# Patient Record
Sex: FEMALE | Race: WHITE | NOT HISPANIC OR LATINO | Employment: PART TIME | URBAN - METROPOLITAN AREA
[De-identification: names, ages, dates, MRNs, and addresses within clinical notes are randomized per-mention and may not be internally consistent; named-entity substitution may affect disease eponyms.]

---

## 2017-01-20 ENCOUNTER — OFFICE VISIT (OUTPATIENT)
Dept: FAMILY MEDICINE CLINIC | Facility: CLINIC | Age: 22
End: 2017-01-20

## 2017-01-20 VITALS
TEMPERATURE: 98 F | OXYGEN SATURATION: 98 % | SYSTOLIC BLOOD PRESSURE: 124 MMHG | HEIGHT: 65 IN | WEIGHT: 241 LBS | DIASTOLIC BLOOD PRESSURE: 78 MMHG | HEART RATE: 94 BPM | BODY MASS INDEX: 40.15 KG/M2

## 2017-01-20 DIAGNOSIS — N75.0 BARTHOLIN CYST: Primary | ICD-10-CM

## 2017-01-20 PROCEDURE — 99213 OFFICE O/P EST LOW 20 MIN: CPT | Performed by: NURSE PRACTITIONER

## 2017-01-20 RX ORDER — MINOCYCLINE HYDROCHLORIDE 100 MG/1
100 CAPSULE ORAL 2 TIMES DAILY
Qty: 20 CAPSULE | Refills: 0 | Status: SHIPPED | OUTPATIENT
Start: 2017-01-20 | End: 2017-02-10 | Stop reason: HOSPADM

## 2017-01-20 NOTE — MR AVS SNAPSHOT
Carina Bravojoce   1/20/2017 4:00 PM   Office Visit    Provider:  ZACH Bernstein   Department:  [facility]   Dept Phone:  [phone]                Your Full Care Plan              Today's Medication Changes          These changes are accurate as of: 1/20/17  4:23 PM.  If you have any questions, ask your nurse or doctor.               New Medication(s)Ordered:     minocycline 100 MG capsule   Commonly known as:  MINOCIN   Take 1 capsule by mouth 2 (Two) Times a Day.   Started by:  ZACH Bernstein         Stop taking medication(s)listed here:     MethylPREDNISolone 4 MG tablet   Commonly known as:  MEDROL (FELIX)   Stopped by:  ZACH Bernstein                Where to Get Your Medications      These medications were sent to [facility] [address] [facility] [address] [facility] AT [facility] - [phone]  - [phone]   [address] [facility] [address]    Hours:  24-hours Phone:  [phone]     minocycline 100 MG capsule                  Your Updated Medication List          This list is accurate as of: 1/20/17  4:23 PM.  Always use your most recent med list.                albuterol 108 (90 BASE) MCG/ACT inhaler   Commonly known as:  PROVENTIL HFA;VENTOLIN HFA       buPROPion 100 MG tablet   Commonly known as:  WELLBUTRIN       citalopram 20 MG tablet   Commonly known as:  CeleXA       EPIPEN 2-FELIX 0.3 MG/0.3ML solution auto-injector injection   Generic drug:  EPINEPHrine       hydrOXYzine 25 MG tablet   Commonly known as:  ATARAX   Take 1 tablet by mouth Every 8 (Eight) Hours As Needed for itching.       minocycline 100 MG capsule   Commonly known as:  MINOCIN   Take 1 capsule by mouth 2 (Two) Times a Day.       ORTHO TRI-CYCLEN LO 0.18/0.215/0.25 MG-25 MCG per tablet   Generic drug:  norgestimate-ethinyl estradiol               You Were Diagnosed With        Codes Ana Luisa Prasad  "cyst    -  Primary ICD-10-CM: N75.0  ICD-9-CM: 616.2       Instructions     None    Patient Instructions History      MyChart Signup     [facility] Online [employer] Platform allows you to send messages to your doctor, view your test results, renew your prescriptions, schedule appointments, and more. To sign up, go to [employer] and click on the Sign Up Now link in the New User? box. Enter your BEET[employer] Activation Code exactly as it appears below along with the last four digits of your Social Security Number and your Date of Birth () to complete the sign-up process. If you do not sign up before the expiration date, you must request a new code.    BEETmobile Activation Code: [email or web address]  Expires: 2/3/2017  4:23 PM    If you have questions, you can email [email or web address] or call [phone] to talk to our BEET[employer] staff. Remember, BEETmobile is NOT to be used for urgent needs. For medical emergencies, dial 911.               Other Info from Your Visit           Your Appointments     2017 10:45 AM EDT   Follow Up with Jozef Ivey DO   [facility] (--)    [address] [place] [id number] [phone]           Arrive 15 minutes prior to appointment.              Allergies     Eggs Or Egg-derived Products  Swelling    Eyes swell shut, has some eggs cooked in food.    Nitrofurantoin      Unsure    Other      FOOD ALLERGIES: see list : peanuts and ranch dressing    Amoxicillin  Rash    Erythromycin  Rash    Ibuprofen  Rash    Sulfa Antibiotics  Rash    Triamcinolone  Rash      Reason for Visit     Cyst located on her vagina x 3 days       Vital Signs     Blood Pressure Pulse Temperature Height Weight Oxygen Saturation    124/78 94 98 °F (36.7 °C) (Oral) 65\" (165.1 cm) 241 lb (109 kg) 98%    Body Mass Index Smoking Status                40.1 kg/m2 Never Smoker          Problems and Diagnoses Noted     Bartholin cyst    -  Primary      "

## 2017-01-20 NOTE — PROGRESS NOTES
Subjective   Carina Galindo is a 21 y.o. female.     Vaginal Pain   Primary symptoms comment: sore on the labia. This is a new problem. The current episode started in the past 7 days. The problem occurs constantly. The problem has been gradually worsening. The pain is moderate. The problem affects the right side. She is not pregnant. Pertinent negatives include no chills or fever. Exacerbated by: pressure. She has tried warm baths for the symptoms. The treatment provided no relief. She is sexually active. No, her partner does not have an STD. (Barthelin cysts)        The following portions of the patient's history were reviewed and updated as appropriate: allergies, current medications, past medical history, past social history and problem list.    Review of Systems   Constitutional: Negative.  Negative for chills and fever.   Respiratory: Negative.    Cardiovascular: Negative.    Genitourinary: Positive for genital sores (right labia) and vaginal pain.       Objective   Physical Exam   Constitutional: She appears well-developed and well-nourished.   Cardiovascular: Normal rate, regular rhythm and normal heart sounds.    Pulmonary/Chest: Effort normal and breath sounds normal.   Genitourinary: Pelvic exam was performed with patient supine.   Genitourinary Comments: Swelling, erythema to the right labia.     Nursing note and vitals reviewed.    Vitals:    01/20/17 1549   BP: 124/78   Pulse: 94   Temp: 98 °F (36.7 °C)   SpO2: 98%       Assessment/Plan   Carina was seen today for cyst.    Diagnoses and all orders for this visit:    Bartholin cyst  -     minocycline (MINOCIN) 100 MG capsule; Take 1 capsule by mouth 2 (Two) Times a Day.    Warm soaks  Tylenol as needed for pain  RTC if not improved

## 2017-02-10 ENCOUNTER — OFFICE VISIT (OUTPATIENT)
Dept: FAMILY MEDICINE CLINIC | Facility: CLINIC | Age: 22
End: 2017-02-10

## 2017-02-10 VITALS
SYSTOLIC BLOOD PRESSURE: 96 MMHG | DIASTOLIC BLOOD PRESSURE: 76 MMHG | WEIGHT: 243 LBS | HEIGHT: 65 IN | TEMPERATURE: 98.5 F | HEART RATE: 91 BPM | BODY MASS INDEX: 40.48 KG/M2 | OXYGEN SATURATION: 98 %

## 2017-02-10 DIAGNOSIS — J45.20 MILD INTERMITTENT ASTHMA WITHOUT COMPLICATION: Primary | ICD-10-CM

## 2017-02-10 DIAGNOSIS — T78.40XD ALLERGIC REACTION, SUBSEQUENT ENCOUNTER: ICD-10-CM

## 2017-02-10 DIAGNOSIS — R63.5 WEIGHT GAIN, ABNORMAL: ICD-10-CM

## 2017-02-10 PROCEDURE — 99213 OFFICE O/P EST LOW 20 MIN: CPT | Performed by: NURSE PRACTITIONER

## 2017-02-10 RX ORDER — EPINEPHRINE 0.3 MG/.3ML
0.3 INJECTION SUBCUTANEOUS ONCE
Qty: 2 EACH | Refills: 1 | Status: SHIPPED | OUTPATIENT
Start: 2017-02-10 | End: 2017-02-10

## 2017-02-10 RX ORDER — ALBUTEROL SULFATE 90 UG/1
2 AEROSOL, METERED RESPIRATORY (INHALATION) AS NEEDED
Qty: 3.7 G | Refills: 3 | Status: SHIPPED | OUTPATIENT
Start: 2017-02-10 | End: 2018-03-07

## 2017-02-10 NOTE — PROGRESS NOTES
"Socorro Galindo is a 21 y.o. female who is here from an allergic reaction she had on 17. Patient said she took benadryl since her epi pen had . Patient also would like to get a refill on her albuterol inhaler.     History of Present Illness   Allergic reaction involved eating cookies from [employer], involved swelling of lip and tongue. Can swallow, but does get associated itching. Does see Dr. Ferrer, told unsure if reactions would get better or worse with time. Has been taking claritin every day in the morning.     In last week increased inhaler use, 2 x week. Less is typical.     Wants thyroid checked, aunts with thyroid issues (mom's side), trying to lose weight. By going to gym, diet change.  The following portions of the patient's history were reviewed and updated as appropriate: allergies, current medications, past family history, past medical history, past social history, past surgical history and problem list.    Review of Systems   Constitutional: Negative.    HENT: Positive for congestion. Negative for nosebleeds, postnasal drip and sore throat.         Lip and part of tongue will swell   Eyes: Negative.    Respiratory: Positive for wheezing.    Cardiovascular: Negative.    Musculoskeletal: Negative.    Allergic/Immunologic: Positive for environmental allergies and food allergies (mild peanut allergy).   Neurological: Negative.    Hematological: Negative.    Psychiatric/Behavioral: Negative.      Visit Vitals   • BP 96/76 (BP Location: Right arm, Patient Position: Sitting, Cuff Size: Large Adult)   • Pulse 91   • Temp 98.5 °F (36.9 °C) (Oral)   • Ht 65\" (165.1 cm)   • Wt 243 lb (110 kg)   • LMP 02/10/2017   • SpO2 98%   • BMI 40.44 kg/m2     Objective   Physical Exam   Constitutional: She appears well-developed and well-nourished.   HENT:   Head: Normocephalic and atraumatic.   Right Ear: A middle ear effusion is present.   Left Ear: Tympanic membrane normal.   Nose: Nose normal. "   Mouth/Throat: Uvula is midline, oropharynx is clear and moist and mucous membranes are normal.   Neck: Normal range of motion. Neck supple. No thyromegaly present.   Cardiovascular: Normal rate, regular rhythm and normal heart sounds.    Pulses:       Carotid pulses are 2+ on the right side, and 2+ on the left side.  Pulmonary/Chest: Effort normal and breath sounds normal.   Lymphadenopathy:     She has no cervical adenopathy.   Skin: She is not diaphoretic.   Psychiatric: She has a normal mood and affect. Her behavior is normal. Judgment and thought content normal.   Nursing note and vitals reviewed.    Assessment/Plan   Problems Addressed this Visit        Respiratory    Asthma - Primary    Relevant Medications    albuterol (PROVENTIL HFA;VENTOLIN HFA) 108 (90 BASE) MCG/ACT inhaler       Other    Weight gain, abnormal    Relevant Orders    TSH      Other Visit Diagnoses     Allergic reaction, subsequent encounter        Relevant Medications    EPINEPHrine (EPIPEN 2-FELIX) 0.3 MG/0.3ML solution auto-injector injection        Can add pepcid for residual sensation, without swelling. If uses epi pen, to ER, Agrees. Consider plant based diet. Suspect thyroid will be normal, sequelae of prolonged illness 1 yr ago is wieght gain. FU annually and prn

## 2017-02-11 LAB — TSH SERPL DL<=0.005 MIU/L-ACNC: 1.93 UIU/ML (ref 0.45–4.5)

## 2017-02-13 ENCOUNTER — TELEPHONE (OUTPATIENT)
Dept: FAMILY MEDICINE CLINIC | Facility: CLINIC | Age: 22
End: 2017-02-13

## 2017-04-27 ENCOUNTER — OFFICE VISIT (OUTPATIENT)
Dept: FAMILY MEDICINE CLINIC | Facility: CLINIC | Age: 22
End: 2017-04-27

## 2017-04-27 VITALS
SYSTOLIC BLOOD PRESSURE: 108 MMHG | HEIGHT: 65 IN | BODY MASS INDEX: 40.48 KG/M2 | DIASTOLIC BLOOD PRESSURE: 68 MMHG | OXYGEN SATURATION: 98 % | HEART RATE: 98 BPM | WEIGHT: 243 LBS | TEMPERATURE: 98.4 F

## 2017-04-27 DIAGNOSIS — L50.9 URTICARIA: ICD-10-CM

## 2017-04-27 DIAGNOSIS — M25.571 ACUTE RIGHT ANKLE PAIN: Primary | ICD-10-CM

## 2017-04-27 PROCEDURE — 99213 OFFICE O/P EST LOW 20 MIN: CPT | Performed by: FAMILY MEDICINE

## 2017-04-27 PROCEDURE — 73610 X-RAY EXAM OF ANKLE: CPT | Performed by: FAMILY MEDICINE

## 2017-04-27 NOTE — PROGRESS NOTES
Subjective   Carina Galindo is a 21 y.o. female. Presents today for   Chief Complaint   Patient presents with   • Rash   • Ankle Pain     right       Ankle Injury    Incident onset: 2 weeks ago. The incident occurred in the yard. The injury mechanism was a twisting injury (stepped in hole). The pain is present in the right ankle. The quality of the pain is described as aching. The pain is moderate. The pain has been intermittent since onset. Pertinent negatives include no numbness or tingling. Associated symptoms comments: +swelling. The symptoms are aggravated by weight bearing and movement. She has tried ice, elevation and NSAIDs for the symptoms. The treatment provided mild relief.   Rash   This is a recurrent problem. The current episode started 1 to 4 weeks ago. The problem has been waxing and waning since onset. Location: arms/legs;  Variable;  Notes after eats certain things, has had extensive allergy testing in past not really elucidating inciting cause. The rash is characterized by redness and itchiness. It is unknown if there was an exposure to a precipitant. Pertinent negatives include no congestion, cough, facial edema, fever, shortness of breath, sore throat or vomiting. Treatments tried: claritine. The treatment provided mild relief. Her past medical history is significant for allergies.       Review of Systems   Constitutional: Negative for fever.   HENT: Negative for congestion and sore throat.    Respiratory: Negative for cough and shortness of breath.    Gastrointestinal: Negative for vomiting.   Skin: Positive for rash.   Neurological: Negative for tingling and numbness.       The following portions of the patient's history were reviewed and updated as appropriate: allergies, current medications, past medical history and problem list.    Patient Active Problem List   Diagnosis   • Palpitations   • Swelling   • Anxiety   • Asthma   • Weight gain, abnormal       Allergies   Allergen Reactions   •  "Eggs Or Egg-Derived Products Swelling     Eyes swell shut, has some eggs cooked in food.   • Nitrofurantoin      Unsure     • Other      FOOD ALLERGIES: see list : peanuts and ranch dressing   • Amoxicillin Rash   • Erythromycin Rash   • Ibuprofen Rash   • Sulfa Antibiotics Rash   • Triamcinolone Rash       Current Outpatient Prescriptions on File Prior to Visit   Medication Sig Dispense Refill   • buPROPion (WELLBUTRIN) 100 MG tablet Take 100 mg by mouth 2 (Two) Times a Day.     • citalopram (CeleXA) 20 MG tablet Take 20 mg by mouth daily.     • norgestimate-ethinyl estradiol (ORTHO TRI-CYCLEN LO) 0.18/0.215/0.25 MG-25 MCG per tablet Take 1 tablet by mouth daily.     • albuterol (PROVENTIL HFA;VENTOLIN HFA) 108 (90 BASE) MCG/ACT inhaler Inhale 2 puffs As Needed for wheezing. 3.7 g 3   • hydrOXYzine (ATARAX) 25 MG tablet Take 1 tablet by mouth Every 8 (Eight) Hours As Needed for itching. 30 tablet 0     No current facility-administered medications on file prior to visit.        Objective   Vitals:    04/27/17 1108   BP: 108/68   BP Location: Right arm   Patient Position: Sitting   Cuff Size: Large Adult   Pulse: 98   Temp: 98.4 °F (36.9 °C)   TempSrc: Oral   SpO2: 98%   Weight: 243 lb (110 kg)   Height: 65\" (165.1 cm)       Physical Exam   Constitutional: She is oriented to person, place, and time. She appears well-developed and well-nourished.   Musculoskeletal:        Right ankle: She exhibits swelling. She exhibits normal range of motion, no ecchymosis, no deformity and normal pulse. Tenderness. Lateral malleolus, AITFL, CF ligament and posterior TFL tenderness found. No medial malleolus, no head of 5th metatarsal and no proximal fibula tenderness found. Achilles tendon normal. Achilles tendon exhibits no pain, no defect and normal Gallagher's test results.   Neurological: She is alert and oriented to person, place, and time.   Skin: Skin is warm and dry. Rash noted. Rash is macular.   Psychiatric: She has a " normal mood and affect. Her behavior is normal.   Nursing note and vitals reviewed.  XRAY: right ankle  INDICATION:  Right ankle pain  Wet read:  Negative acute  no Comparative data  Imagine independently viewed by myself  Radiology reading pending.    Assessment/Plan   Carina was seen today for rash and ankle pain.    Diagnoses and all orders for this visit:    Acute right ankle pain  -     XR Ankle 3+ View Right  -     MethylPREDNISolone (MEDROL, FELIX,) 4 MG tablet; Take as directed on package instructions.    Urticaria  -     fexofenadine (ALLEGRA) 180 MG tablet; Take 1 tablet by mouth Daily.  -     MethylPREDNISolone (MEDROL, FELIX,) 4 MG tablet; Take as directed on package instructions.    -ice, elevation, compression and rest;  RTC if no improvement  -urticaria - consider another allergist referral, start H1 blocker and will give medrol felix         -Follow up: Prn - RTC if worse or no improvement.          Current Outpatient Prescriptions:   •  buPROPion (WELLBUTRIN) 100 MG tablet, Take 100 mg by mouth 2 (Two) Times a Day., Disp: , Rfl:   •  citalopram (CeleXA) 20 MG tablet, Take 20 mg by mouth daily., Disp: , Rfl:   •  norgestimate-ethinyl estradiol (ORTHO TRI-CYCLEN LO) 0.18/0.215/0.25 MG-25 MCG per tablet, Take 1 tablet by mouth daily., Disp: , Rfl:   •  albuterol (PROVENTIL HFA;VENTOLIN HFA) 108 (90 BASE) MCG/ACT inhaler, Inhale 2 puffs As Needed for wheezing., Disp: 3.7 g, Rfl: 3  •  hydrOXYzine (ATARAX) 25 MG tablet, Take 1 tablet by mouth Every 8 (Eight) Hours As Needed for itching., Disp: 30 tablet, Rfl: 0

## 2017-04-28 RX ORDER — METHYLPREDNISOLONE 4 MG/1
TABLET ORAL
Qty: 21 TABLET | Refills: 0 | Status: SHIPPED | OUTPATIENT
Start: 2017-04-28 | End: 2017-06-20

## 2017-04-28 RX ORDER — FEXOFENADINE HCL 180 MG/1
180 TABLET ORAL DAILY
Qty: 30 TABLET | Refills: 12 | Status: SHIPPED | OUTPATIENT
Start: 2017-04-28 | End: 2018-10-01

## 2017-06-20 ENCOUNTER — OFFICE VISIT (OUTPATIENT)
Dept: FAMILY MEDICINE CLINIC | Facility: CLINIC | Age: 22
End: 2017-06-20

## 2017-06-20 VITALS
TEMPERATURE: 98.3 F | DIASTOLIC BLOOD PRESSURE: 78 MMHG | HEART RATE: 100 BPM | OXYGEN SATURATION: 99 % | SYSTOLIC BLOOD PRESSURE: 112 MMHG | HEIGHT: 65 IN | BODY MASS INDEX: 39.82 KG/M2 | WEIGHT: 239 LBS

## 2017-06-20 DIAGNOSIS — J01.10 ACUTE NON-RECURRENT FRONTAL SINUSITIS: ICD-10-CM

## 2017-06-20 DIAGNOSIS — M65.9 TENOSYNOVITIS OF HAND: ICD-10-CM

## 2017-06-20 DIAGNOSIS — J45.20 MILD INTERMITTENT ASTHMA WITHOUT COMPLICATION: Primary | ICD-10-CM

## 2017-06-20 PROCEDURE — 99213 OFFICE O/P EST LOW 20 MIN: CPT | Performed by: NURSE PRACTITIONER

## 2017-06-20 RX ORDER — METHYLPREDNISOLONE 4 MG/1
TABLET ORAL
Qty: 21 TABLET | Refills: 0 | Status: SHIPPED | OUTPATIENT
Start: 2017-06-20 | End: 2017-07-31

## 2017-06-20 RX ORDER — DOXYCYCLINE HYCLATE 100 MG/1
100 CAPSULE ORAL 2 TIMES DAILY
Qty: 14 CAPSULE | Refills: 0 | Status: SHIPPED | OUTPATIENT
Start: 2017-06-20 | End: 2017-07-31

## 2017-06-20 RX ORDER — TRAZODONE HYDROCHLORIDE 50 MG/1
TABLET ORAL
Refills: 2 | COMMUNITY
Start: 2017-06-12 | End: 2018-10-01

## 2017-06-20 RX ORDER — KETOTIFEN FUMARATE 0.35 MG/ML
SOLUTION/ DROPS OPHTHALMIC
Refills: 11 | COMMUNITY
Start: 2017-05-19 | End: 2017-07-31

## 2017-06-20 RX ORDER — LEVOCETIRIZINE DIHYDROCHLORIDE 5 MG/1
TABLET, FILM COATED ORAL
Refills: 11 | COMMUNITY
Start: 2017-05-23 | End: 2017-09-18

## 2017-06-20 RX ORDER — BUPROPION HYDROCHLORIDE 300 MG/1
TABLET ORAL
Refills: 2 | COMMUNITY
Start: 2017-06-12 | End: 2018-03-07

## 2017-06-20 NOTE — PROGRESS NOTES
"Socorro Galindo is a 22 y.o. female who presents c/o nasal congestion, headache x 3 days. Also with knot on right palm x 1 week.     History of Present Illness   Has similar symptoms as dad, has taken allegra, flonase, and tylenol for the headaches.   The following portions of the patient's history were reviewed and updated as appropriate: allergies, current medications, past family history, past medical history, past social history, past surgical history and problem list.    Review of Systems   Constitutional: Negative for chills and fever.   HENT: Positive for congestion, ear pain, postnasal drip, rhinorrhea, sinus pressure and sore throat. Negative for ear discharge, facial swelling, hearing loss, nosebleeds, sneezing and trouble swallowing.    Respiratory: Negative for cough, shortness of breath and wheezing.    Cardiovascular: Negative.  Negative for chest pain.   Gastrointestinal: Negative for abdominal pain.   Endocrine: Negative.    Musculoskeletal: Positive for arthralgias (knot on R finger, sore x 3 weeks, R handed).   Allergic/Immunologic: Negative for environmental allergies.   Neurological: Positive for headaches.   Psychiatric/Behavioral: Negative.      /78  Pulse 100  Temp 98.3 °F (36.8 °C) (Oral)   Ht 65\" (165.1 cm)  Wt 239 lb (108 kg)  LMP 05/29/2017 (Approximate)  SpO2 99%  BMI 39.77 kg/m2    Objective   Physical Exam   Constitutional: She appears well-developed and well-nourished. No distress.   HENT:   Head: Normocephalic.   Right Ear: Tympanic membrane is retracted. No middle ear effusion.   Left Ear: Tympanic membrane is retracted. A middle ear effusion is present.   Nose: Mucosal edema and rhinorrhea present. Right sinus exhibits maxillary sinus tenderness and frontal sinus tenderness. Left sinus exhibits maxillary sinus tenderness and frontal sinus tenderness.   Mouth/Throat: Oropharyngeal exudate and posterior oropharyngeal erythema present.   Cardiovascular: Normal " rate, regular rhythm, normal heart sounds and intact distal pulses.    Pulmonary/Chest: Effort normal and breath sounds normal. No respiratory distress. She has no wheezes. She has no rales.   Musculoskeletal:        Right hand: She exhibits tenderness. She exhibits normal range of motion, no bony tenderness, normal two-point discrimination, normal capillary refill, no deformity, no laceration and no swelling. Normal sensation noted. Normal strength noted.        Hands:  Tendon sheath irritation   Lymphadenopathy:     She has cervical adenopathy.   Skin: She is not diaphoretic.   Psychiatric: She has a normal mood and affect. Judgment and thought content normal.   Nursing note and vitals reviewed.    Assessment/Plan   Problems Addressed this Visit        Respiratory    Asthma - Primary      Other Visit Diagnoses     Acute non-recurrent frontal sinusitis        Tenosynovitis of hand            Continue allegra and flonase, cover for sinusitis, will treat tenosynovitis with medrol dose pack. FU if not settling.

## 2017-07-31 ENCOUNTER — OFFICE VISIT (OUTPATIENT)
Dept: FAMILY MEDICINE CLINIC | Facility: CLINIC | Age: 22
End: 2017-07-31

## 2017-07-31 VITALS
DIASTOLIC BLOOD PRESSURE: 74 MMHG | BODY MASS INDEX: 39.99 KG/M2 | SYSTOLIC BLOOD PRESSURE: 116 MMHG | HEART RATE: 97 BPM | TEMPERATURE: 97.9 F | WEIGHT: 240 LBS | OXYGEN SATURATION: 98 % | HEIGHT: 65 IN

## 2017-07-31 DIAGNOSIS — T63.441A BEE STING REACTION, ACCIDENTAL OR UNINTENTIONAL, INITIAL ENCOUNTER: Primary | ICD-10-CM

## 2017-07-31 PROCEDURE — 99213 OFFICE O/P EST LOW 20 MIN: CPT | Performed by: NURSE PRACTITIONER

## 2017-07-31 RX ORDER — HYDROXYZINE HYDROCHLORIDE 25 MG/1
25 TABLET, FILM COATED ORAL 3 TIMES DAILY PRN
Qty: 15 TABLET | Refills: 0 | Status: SHIPPED | OUTPATIENT
Start: 2017-07-31 | End: 2018-03-07

## 2017-07-31 NOTE — PROGRESS NOTES
"Socorro Galindo is a 22 y.o. female. This past weekend she was stung in her L leg by bees and has pain and edema. She was stung on Sat. Yesterday the leg was really swollen and red. She took some Benadryl and used cold wash cloth on it. This seemed to help. She denies any shortness of breath or wheezing.       Insect Bite   This is a new problem. The current episode started in the past 7 days. The problem has been gradually improving. Associated symptoms comments: Redness, swelling of the left upper thigh  . Treatments tried: cold cloth and benadryl.        The following portions of the patient's history were reviewed and updated as appropriate: allergies, current medications, past medical history, past social history and problem list.    Review of Systems   Constitutional: Negative.    Respiratory: Negative.    Cardiovascular: Negative.    Skin:        Bee stings on the left upper thigh         Objective   Physical Exam   Constitutional: She appears well-developed and well-nourished.   Cardiovascular: Normal rate, regular rhythm and normal heart sounds.    Pulmonary/Chest: Effort normal and breath sounds normal.   Skin: Skin is warm and dry.   Small red whelps on the upper left thigh, slight erythema     Nursing note and vitals reviewed.    Vitals:    07/31/17 1002   BP: 116/74   Pulse: 97   Temp: 97.9 °F (36.6 °C)   TempSrc: Oral   SpO2: 98%   Weight: 240 lb (109 kg)   Height: 65\" (165.1 cm)       Assessment/Plan   Diagnoses and all orders for this visit:    Bee sting reaction, accidental or unintentional, initial encounter  -     hydrOXYzine (ATARAX) 25 MG tablet; Take 1 tablet by mouth 3 (Three) Times a Day As Needed for Itching.    Continue cool cloths  RTC if not improved.           "

## 2017-09-05 ENCOUNTER — OFFICE VISIT (OUTPATIENT)
Dept: FAMILY MEDICINE CLINIC | Facility: CLINIC | Age: 22
End: 2017-09-05

## 2017-09-05 VITALS
HEIGHT: 65 IN | HEART RATE: 94 BPM | TEMPERATURE: 98.1 F | SYSTOLIC BLOOD PRESSURE: 122 MMHG | OXYGEN SATURATION: 96 % | DIASTOLIC BLOOD PRESSURE: 76 MMHG | BODY MASS INDEX: 40.32 KG/M2 | WEIGHT: 242 LBS

## 2017-09-05 DIAGNOSIS — J02.9 SORE THROAT: ICD-10-CM

## 2017-09-05 DIAGNOSIS — J02.9 PHARYNGITIS, UNSPECIFIED ETIOLOGY: Primary | ICD-10-CM

## 2017-09-05 LAB
EXPIRATION DATE: NORMAL
INTERNAL CONTROL: NORMAL
Lab: NORMAL
S PYO AG THROAT QL: NEGATIVE

## 2017-09-05 PROCEDURE — 99213 OFFICE O/P EST LOW 20 MIN: CPT | Performed by: NURSE PRACTITIONER

## 2017-09-05 PROCEDURE — 87880 STREP A ASSAY W/OPTIC: CPT | Performed by: NURSE PRACTITIONER

## 2017-09-05 RX ORDER — AZITHROMYCIN 250 MG/1
TABLET, FILM COATED ORAL
Qty: 6 TABLET | Refills: 0 | Status: SHIPPED | OUTPATIENT
Start: 2017-09-05 | End: 2018-02-14 | Stop reason: SDUPTHER

## 2017-09-05 NOTE — PROGRESS NOTES
"Socorro Galindo is a 22 y.o. female. Sore throat that started yesterday. Difficulty swallowing.  She has no known contact who were ill. She is not sure if has been running a fever because she has not checked it but has been having chills. She does note she has swollen gland especially on the left.    Sore Throat    This is a new problem. The current episode started yesterday. The problem has been unchanged. There has been no fever. Associated symptoms include a hoarse voice, swollen glands and trouble swallowing. She has had no exposure to strep or mono. She has tried cool liquids for the symptoms. The treatment provided no relief.        The following portions of the patient's history were reviewed and updated as appropriate: allergies, current medications, past medical history, past social history, past surgical history and problem list.    Review of Systems   Constitutional: Negative.    HENT: Positive for hoarse voice, sore throat, trouble swallowing and voice change.    Respiratory: Negative.    Cardiovascular: Negative.        Objective   Physical Exam   Constitutional: Vital signs are normal. She appears well-developed and well-nourished. She is cooperative.   HENT:   Right Ear: Hearing and tympanic membrane normal.   Left Ear: Hearing and tympanic membrane normal.   Nose: Nose normal.   Mouth/Throat: Mucous membranes are normal. Posterior oropharyngeal edema and posterior oropharyngeal erythema present.   Cardiovascular: Normal rate, regular rhythm and normal heart sounds.    Pulmonary/Chest: Effort normal and breath sounds normal.   Neurological: She is alert.   Nursing note and vitals reviewed.    Vitals:    09/05/17 1103   BP: 122/76   Pulse: 94   Temp: 98.1 °F (36.7 °C)   TempSrc: Oral   SpO2: 96%   Weight: 242 lb (110 kg)   Height: 65\" (165.1 cm)     Results for orders placed or performed in visit on 09/05/17   POC Rapid Strep A   Result Value Ref Range    Rapid Strep A Screen Negative " Negative, VALID, INVALID, Not Performed    Internal Control Passed Passed    Lot Number [id number]     Expiration Date 2018/10/31        Assessment/Plan   Diagnoses and all orders for this visit:    Pharyngitis, unspecified etiology  -     azithromycin (ZITHROMAX) 250 MG tablet; Take 2 tablets the first day, then 1 tablet daily for 4 days.    Sore throat  -     POC Rapid Strep A    She has taken Zithromax many times without problems.   Lots of fluids  Ok to return to school tomorrow if afebrile.  Tylenol or advil as needed for fever/pain.  RTC if not improved.

## 2017-09-18 ENCOUNTER — OFFICE VISIT (OUTPATIENT)
Dept: FAMILY MEDICINE CLINIC | Facility: CLINIC | Age: 22
End: 2017-09-18

## 2017-09-18 VITALS
WEIGHT: 240 LBS | BODY MASS INDEX: 39.99 KG/M2 | HEART RATE: 98 BPM | DIASTOLIC BLOOD PRESSURE: 76 MMHG | TEMPERATURE: 98.3 F | OXYGEN SATURATION: 96 % | SYSTOLIC BLOOD PRESSURE: 118 MMHG | HEIGHT: 65 IN

## 2017-09-18 DIAGNOSIS — J00 ACUTE NASOPHARYNGITIS: Primary | ICD-10-CM

## 2017-09-18 PROCEDURE — 99213 OFFICE O/P EST LOW 20 MIN: CPT | Performed by: NURSE PRACTITIONER

## 2017-09-18 RX ORDER — BENZONATATE 100 MG/1
100 CAPSULE ORAL 3 TIMES DAILY PRN
Qty: 30 CAPSULE | Refills: 0 | Status: SHIPPED | OUTPATIENT
Start: 2017-09-18 | End: 2018-03-07

## 2017-09-18 NOTE — PROGRESS NOTES
Subjective   Carina Galindo is a 22 y.o. female. Nasal congestion that started on Friday, and also diarrhea. Feels short of breath. Has gotten progressively worse since then. She is using her inhaler and the allegra but still feels like she's a little short of breath especially with walking. She has cough but not productive. + sinus pain and headache.    Started with the diarrhea last week. Was on antibiotics the week before. She states it's improving now. No nausea or vomiting.     URI    This is a new problem. The current episode started in the past 7 days. The problem has been gradually worsening. There has been no fever. Associated symptoms include congestion, coughing, diarrhea, headaches and sinus pain.        The following portions of the patient's history were reviewed and updated as appropriate: allergies, current medications, past medical history, past social history, past surgical history and problem list.    Review of Systems   HENT: Positive for congestion.    Respiratory: Positive for cough.    Gastrointestinal: Positive for diarrhea.   Neurological: Positive for headaches.       Objective   Physical Exam   Constitutional: Vital signs are normal. She appears well-developed and well-nourished. She is cooperative.   HENT:   Right Ear: Hearing normal. A middle ear effusion is present.   Left Ear: Hearing normal. A middle ear effusion is present.   Mouth/Throat: Uvula is midline and mucous membranes are normal. Posterior oropharyngeal erythema present.   Cardiovascular: Normal rate, regular rhythm and normal heart sounds.    Pulmonary/Chest: Effort normal and breath sounds normal.   Neurological: She is alert.   Nursing note and vitals reviewed.    Vitals:    09/18/17 0944   BP: 118/76   Pulse: 98   Temp: 98.3 °F (36.8 °C)   SpO2: 96%       Assessment/Plan   Diagnoses and all orders for this visit:    Acute nasopharyngitis  -     Chlorcyclizine-Pseudoephed 25-60 MG tablet; Take 1 tablet by mouth 2 (Two)  Times a Day.  -     benzonatate (TESSALON PERLES) 100 MG capsule; Take 1 capsule by mouth 3 (Three) Times a Day As Needed for Cough.    Lots of fluids.   Hold the Allegra while taking the Stahist.   If not improving or increased colored d/c or productive cough, RTC.

## 2018-02-14 ENCOUNTER — OFFICE VISIT (OUTPATIENT)
Dept: FAMILY MEDICINE CLINIC | Facility: CLINIC | Age: 23
End: 2018-02-14

## 2018-02-14 VITALS
WEIGHT: 250 LBS | BODY MASS INDEX: 41.65 KG/M2 | HEIGHT: 65 IN | SYSTOLIC BLOOD PRESSURE: 122 MMHG | OXYGEN SATURATION: 97 % | TEMPERATURE: 98.2 F | HEART RATE: 114 BPM | DIASTOLIC BLOOD PRESSURE: 78 MMHG

## 2018-02-14 DIAGNOSIS — J02.9 PHARYNGITIS, UNSPECIFIED ETIOLOGY: ICD-10-CM

## 2018-02-14 DIAGNOSIS — J02.9 SORE THROAT: Primary | ICD-10-CM

## 2018-02-14 DIAGNOSIS — J32.9 SINUSITIS, UNSPECIFIED CHRONICITY, UNSPECIFIED LOCATION: ICD-10-CM

## 2018-02-14 LAB
EXPIRATION DATE: NORMAL
INTERNAL CONTROL: NORMAL
Lab: NORMAL
S PYO AG THROAT QL: NEGATIVE

## 2018-02-14 PROCEDURE — 87880 STREP A ASSAY W/OPTIC: CPT | Performed by: NURSE PRACTITIONER

## 2018-02-14 PROCEDURE — 99213 OFFICE O/P EST LOW 20 MIN: CPT | Performed by: NURSE PRACTITIONER

## 2018-02-14 RX ORDER — AZITHROMYCIN 250 MG/1
TABLET, FILM COATED ORAL
Qty: 6 TABLET | Refills: 0 | Status: SHIPPED | OUTPATIENT
Start: 2018-02-14 | End: 2018-03-07

## 2018-02-14 RX ORDER — MONTELUKAST SODIUM 10 MG/1
10 TABLET ORAL DAILY
Refills: 0 | COMMUNITY
Start: 2018-01-15 | End: 2023-03-14

## 2018-02-14 NOTE — PROGRESS NOTES
Subjective   Carina Galindo is a 22 y.o. female. Sore throat, drainage. Started getting sick 2 days ago. No fever, + body aches, no chills. Thick yellow/green sinus drainage. Productive cough of yellow/green sputum. She has sinus surgery a few weeks back and then again last week had some more polyps removed. She is worried about infection. She took her normal allergy medications and used a saline nasal rinse but has not improved.     Cough   This is a new problem. The current episode started in the past 7 days. Associated symptoms include headaches, postnasal drip, rhinorrhea, a sore throat and wheezing. Pertinent negatives include no chills, ear pain, fever or shortness of breath. The symptoms are aggravated by lying down. Treatments tried: allergy meds. Her past medical history is significant for asthma and environmental allergies.   Sinusitis   This is a new problem. The current episode started in the past 7 days. The problem has been gradually worsening since onset. There has been no fever. Associated symptoms include congestion, coughing, headaches, a hoarse voice, sinus pressure and a sore throat. Pertinent negatives include no chills, ear pain or shortness of breath. Past treatments include nothing (routine allergy meds).        The following portions of the patient's history were reviewed and updated as appropriate: allergies, current medications, past medical history, past social history, past surgical history and problem list.    Review of Systems   Constitutional: Negative.  Negative for chills and fever.   HENT: Positive for congestion, hoarse voice, postnasal drip, rhinorrhea, sinus pressure and sore throat. Negative for ear pain.    Respiratory: Positive for cough and wheezing. Negative for chest tightness and shortness of breath.    Cardiovascular: Negative.    Allergic/Immunologic: Positive for environmental allergies.   Neurological: Positive for headaches.       Objective   Physical Exam  "  Constitutional: Vital signs are normal. She appears well-developed and well-nourished. She is cooperative.   HENT:   Right Ear: Hearing normal. A middle ear effusion is present.   Left Ear: Hearing normal. A middle ear effusion is present.   Nose: Mucosal edema and rhinorrhea present. Right sinus exhibits frontal sinus tenderness. Left sinus exhibits frontal sinus tenderness.   Mouth/Throat: Uvula is midline and mucous membranes are normal. Posterior oropharyngeal edema and posterior oropharyngeal erythema present. No tonsillar exudate.   Cardiovascular: Normal rate, regular rhythm and normal heart sounds.    Pulmonary/Chest: Effort normal and breath sounds normal.   Neurological: She is alert.   Nursing note and vitals reviewed.    Vitals:    02/14/18 0933   BP: 122/78   Pulse: 114   Temp: 98.2 °F (36.8 °C)   TempSrc: Oral   SpO2: 97%   Weight: 113 kg (250 lb)   Height: 165.1 cm (65\")     Results for orders placed or performed in visit on 02/14/18   POC Rapid Strep A   Result Value Ref Range    Rapid Strep A Screen Negative Negative, VALID, INVALID, Not Performed    Internal Control Passed Passed    Lot Number [id number]     Expiration Date 2019/05/31        Assessment/Plan   Diagnoses and all orders for this visit:    Sore throat  -     POC Rapid Strep A    Pharyngitis, unspecified etiology  -     azithromycin (ZITHROMAX) 250 MG tablet; Take 2 tablets the first day, then 1 tablet daily for 4 days.    Sinusitis, unspecified chronicity, unspecified location  -     azithromycin (ZITHROMAX) 250 MG tablet; Take 2 tablets the first day, then 1 tablet daily for 4 days.    Will cover with antibiotic in view of recent surgery.   Continue allergy medications  Has used Z pack before without problems.   RTC if not improved.           "

## 2018-03-07 ENCOUNTER — OFFICE VISIT (OUTPATIENT)
Dept: FAMILY MEDICINE CLINIC | Facility: CLINIC | Age: 23
End: 2018-03-07

## 2018-03-07 VITALS
WEIGHT: 240 LBS | OXYGEN SATURATION: 98 % | TEMPERATURE: 99.7 F | DIASTOLIC BLOOD PRESSURE: 74 MMHG | SYSTOLIC BLOOD PRESSURE: 118 MMHG | HEIGHT: 65 IN | HEART RATE: 102 BPM | BODY MASS INDEX: 39.99 KG/M2

## 2018-03-07 DIAGNOSIS — L02.214 CUTANEOUS ABSCESS OF GROIN: Primary | ICD-10-CM

## 2018-03-07 PROCEDURE — 99213 OFFICE O/P EST LOW 20 MIN: CPT | Performed by: NURSE PRACTITIONER

## 2018-03-07 RX ORDER — DOXYCYCLINE HYCLATE 100 MG/1
100 CAPSULE ORAL 2 TIMES DAILY
Qty: 14 CAPSULE | Refills: 0 | Status: SHIPPED | OUTPATIENT
Start: 2018-03-07 | End: 2018-07-30

## 2018-07-30 ENCOUNTER — OFFICE VISIT (OUTPATIENT)
Dept: FAMILY MEDICINE CLINIC | Facility: CLINIC | Age: 23
End: 2018-07-30

## 2018-07-30 VITALS
TEMPERATURE: 98.6 F | WEIGHT: 250 LBS | SYSTOLIC BLOOD PRESSURE: 98 MMHG | BODY MASS INDEX: 41.65 KG/M2 | HEART RATE: 94 BPM | DIASTOLIC BLOOD PRESSURE: 68 MMHG | OXYGEN SATURATION: 98 % | HEIGHT: 65 IN

## 2018-07-30 DIAGNOSIS — J45.20 MILD INTERMITTENT ASTHMA WITHOUT COMPLICATION: ICD-10-CM
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
## 2018-07-30 PROCEDURE — 99213 OFFICE O/P EST LOW 20 MIN: CPT | Performed by: NURSE PRACTITIONER

## 2018-07-30 RX ORDER — ALBUTEROL SULFATE 90 UG/1
2 AEROSOL, METERED RESPIRATORY (INHALATION) EVERY 4 HOURS PRN
Qty: 18 G | Refills: 6 | Status: SHIPPED | OUTPATIENT
Start: 2018-07-30 | End: 2018-08-03 | Stop reason: CLARIF

## 2018-07-30 RX ORDER — DIAPER,BRIEF,INFANT-TODD,DISP
EACH MISCELLANEOUS 3 TIMES DAILY
Qty: 42 G | Refills: 0 | Status: SHIPPED | OUTPATIENT
Start: 2018-07-30 | End: 2018-10-25

## 2018-07-30 NOTE — PROGRESS NOTES
Subjective   Carina Galindo is a 23 y.o. female here today with possible allergic reaction. Recently moved to [facility] and thinks maybe there are new pollens or trees out there she is reacting to. She also has atopic dermatitis which she occasionally has breakouts from. This started a couple of days ago with itching and spots. She started taking benadryl and that has helped. There was some pustules in the ones on her chest. Itchy and red. They are starting to clear now. She also needs a refill on her Albuterol inhaler because the other one has .       Rash   This is a new problem. The current episode started in the past 7 days. The problem has been waxing and waning since onset. The rash is diffuse. The rash is characterized by itchiness and redness. Associated with: recently moved to [facility].  Pertinent negatives include no fever. Past treatments include antihistamine. The treatment provided moderate relief.        The following portions of the patient's history were reviewed and updated as appropriate: allergies, current medications, past family history, past medical history, past social history, past surgical history and problem list.    Review of Systems   Constitutional: Negative.  Negative for fever.   Respiratory: Negative.    Cardiovascular: Negative.    Skin: Positive for rash.       Objective   Physical Exam   Constitutional: Vital signs are normal. She appears well-developed and well-nourished. She is cooperative.   Cardiovascular: Normal rate, regular rhythm and normal heart sounds.    Pulmonary/Chest: Effort normal and breath sounds normal.   Neurological: She is alert.   Skin: Skin is warm and dry. Rash noted. Rash is maculopapular.   Chest, back and the back of the left knee   Nursing note and vitals reviewed.    Vitals:    18 1400   BP: 98/68   BP Location: Right arm   Patient Position: Sitting   Cuff Size: Large Adult   Pulse: 94   Temp: 98.6 °F (37 °C)   TempSrc: Oral   SpO2: 98%  "  Weight: 113 kg (250 lb)   Height: 165.1 cm (65\")       Assessment/Plan   Carina was seen today for allergic reaction.    Diagnoses and all orders for this visit:    Insect bite, initial encounter  -     hydrocortisone 1 % cream; Apply  topically to the appropriate area as directed 3 (Three) Times a Day.    Mild intermittent asthma without complication    Other orders  -     albuterol (PROVENTIL HFA;VENTOLIN HFA) 108 (90 Base) MCG/ACT inhaler; Inhale 2 puffs Every 4 (Four) Hours As Needed for Wheezing or Shortness of Air.    Suspect the ones on the chest are bites. Treat with hydrocortisone cream.  Refill albuterol inhaler for exacerbation of asthma as needed.  Continue benadryl  Follow up if not improving.          "

## 2018-08-03 RX ORDER — ALBUTEROL SULFATE 90 UG/1
2 AEROSOL, METERED RESPIRATORY (INHALATION) EVERY 4 HOURS PRN
Qty: 18 G | Refills: 1 | Status: SHIPPED | OUTPATIENT
Start: 2018-08-03

## 2018-10-01 ENCOUNTER — OFFICE VISIT (OUTPATIENT)
Dept: OBSTETRICS AND GYNECOLOGY | Age: 23
End: 2018-10-01

## 2018-10-01 VITALS
WEIGHT: 243 LBS | HEIGHT: 65 IN | DIASTOLIC BLOOD PRESSURE: 78 MMHG | SYSTOLIC BLOOD PRESSURE: 120 MMHG | BODY MASS INDEX: 40.48 KG/M2

## 2018-10-01 DIAGNOSIS — Z30.41 ENCOUNTER FOR SURVEILLANCE OF CONTRACEPTIVE PILLS: ICD-10-CM

## 2018-10-01 DIAGNOSIS — E66.01 CLASS 3 SEVERE OBESITY DUE TO EXCESS CALORIES WITHOUT SERIOUS COMORBIDITY WITH BODY MASS INDEX (BMI) OF 40.0 TO 44.9 IN ADULT (HCC): ICD-10-CM

## 2018-10-01 DIAGNOSIS — R63.5 WEIGHT GAIN, ABNORMAL: ICD-10-CM

## 2018-10-01 DIAGNOSIS — Z12.4 SCREENING FOR CERVICAL CANCER: Primary | ICD-10-CM

## 2018-10-01 PROBLEM — E66.813 CLASS 3 SEVERE OBESITY DUE TO EXCESS CALORIES WITHOUT SERIOUS COMORBIDITY IN ADULT: Status: ACTIVE | Noted: 2018-10-01

## 2018-10-01 PROCEDURE — 99385 PREV VISIT NEW AGE 18-39: CPT | Performed by: OBSTETRICS & GYNECOLOGY

## 2018-10-01 RX ORDER — TRAZODONE HYDROCHLORIDE 50 MG/1
TABLET ORAL NIGHTLY PRN
COMMUNITY
Start: 2017-06-12

## 2018-10-01 RX ORDER — NORGESTIMATE AND ETHINYL ESTRADIOL 7DAYSX3 LO
1 KIT ORAL DAILY
Qty: 28 TABLET | Refills: 11 | Status: SHIPPED | OUTPATIENT
Start: 2018-10-01 | End: 2019-03-18 | Stop reason: SDUPTHER

## 2018-10-01 RX ORDER — CITALOPRAM 40 MG/1
60 TABLET ORAL DAILY
COMMUNITY
End: 2023-03-14

## 2018-10-01 RX ORDER — NORGESTIMATE AND ETHINYL ESTRADIOL 7DAYSX3 LO
1 KIT ORAL
COMMUNITY
Start: 2018-09-12 | End: 2018-10-01 | Stop reason: SDUPTHER

## 2018-10-01 NOTE — ASSESSMENT & PLAN NOTE
Refill  For OCPS today, discussed other options for birth control but not interested at this time.

## 2018-10-01 NOTE — ASSESSMENT & PLAN NOTE
Discussed healthy eating and exercise today. Witting information provided also. Discussed need to lose weight

## 2018-10-01 NOTE — PATIENT INSTRUCTIONS
Preventing Unhealthy Weight Gain, Adult  Staying at a healthy weight is important. When fat builds up in your body, you may become overweight or obese. These conditions put you at greater risk for developing certain health problems, such as heart disease, diabetes, sleeping problems, joint problems, and some cancers.  Unhealthy weight gain is often the result of making unhealthy choices in what you eat. It is also a result of not getting enough exercise. You can make changes to your lifestyle to prevent obesity and stay as healthy as possible.  What nutrition changes can be made?  To maintain a healthy weight and prevent obesity:  · Eat only as much as your body needs. To do this:  ? Pay attention to signs that you are hungry or full. Stop eating as soon as you feel full.  ? If you feel hungry, try drinking water first. Drink enough water so your urine is clear or pale yellow.  ? Eat smaller portions.  ? Look at serving sizes on food labels. Most foods contain more than one serving per container.  ? Eat the recommended amount of calories for your gender and activity level. While most active people should eat around 2,000 calories per day, if you are trying to lose weight or are not very active, you main need to eat less calories. Talk to your health care provider or dietitian about how many calories you should eat each day.  · Choose healthy foods, such as:  ? Fruits and vegetables. Try to fill at least half of your plate at each meal with fruits and vegetables.  ? Whole grains, such as whole wheat bread, brown rice, and quinoa.  ? Lean meats, such as chicken or fish.  ? Other healthy proteins, such as beans, eggs, or tofu.  ? Healthy fats, such as nuts, seeds, fatty fish, and olive oil.  ? Low-fat or fat-free dairy.  · Check food labels and avoid food and drinks that:  ? Are high in calories.  ? Have added sugar.  ? Are high in sodium.  ? Have saturated fats or trans fats.  · Limit how much you eat of the following  foods:  ? Prepackaged meals.  ? Fast food.  ? Fried foods.  ? Processed meat, such as miller, sausage, and deli meats.  ? Fatty cuts of red meat and poultry with skin.  · Cook foods in healthier ways, such as by baking, broiling, or grilling.  · When grocery shopping, try to shop around the outside of the store. This helps you buy mostly fresh foods and avoid canned and prepackaged foods.    What lifestyle changes can be made?  · Exercise at least 30 minutes 5 or more days each week. Exercising includes brisk walking, yard work, biking, running, swimming, and team sports like basketball and soccer. Ask your health care provider which exercises are safe for you.  · Do not use any products that contain nicotine or tobacco, such as cigarettes and e-cigarettes. If you need help quitting, ask your health care provider.  · Limit alcohol intake to no more than 1 drink a day for nonpregnant women and 2 drinks a day for men. One drink equals 12 oz of beer, 5 oz of wine, or 1½ oz of hard liquor.  · Try to get 7-9 hours of sleep each night.  What other changes can be made?  · Keep a food and activity journal to keep track of:  ? What you ate and how many calories you had. Remember to count sauces, dressings, and side dishes.  ? Whether you were active, and what exercises you did.  ? Your calorie, weight, and activity goals.  · Check your weight regularly. Track any changes. If you notice you have gained weight, make changes to your diet or activity routine.  · Avoid taking weight-loss medicines or supplements. Talk to your health care provider before starting any new medicine or supplement.  · Talk to your health care provider before trying any new diet or exercise plan.  Why are these changes important?  Eating healthy, staying active, and having healthy habits not only help prevent obesity, they also:  · Help you to manage stress and emotions.  · Help you to connect with friends and family.  · Improve your  self-esteem.  · Improve your sleep.  · Prevent long-term health problems.    What can happen if changes are not made?  Being obese or overweight can cause you to develop joint or bone problems, which can make it hard for you to stay active or do activities you enjoy. Being obese or overweight also puts stress on your heart and lungs and can lead to health problems like diabetes, heart disease, and some cancers.  Where to find more information:  Talk with your health care provider or a dietitian about healthy eating and healthy lifestyle choices. You may also find other information through these resources:  · U.[place] Department of Agriculture MyPlate: www.choosemyplate.gov  · American Heart Association: www.heart.org  · Centers for Disease Control and Prevention: www.cdc.gov    Summary  · Staying at a healthy weight is important. It helps prevent certain diseases and health problems, such as heart disease, diabetes, joint problems, sleep disorders, and some cancers.  · Being obese or overweight can cause you to develop joint or bone problems, which can make it hard for you to stay active or do activities you enjoy.  · You can prevent unhealthy weight gain by eating a healthy diet, exercising regularly, not smoking, limiting alcohol, and getting enough sleep.  · Talk with your health care provider or a dietitian for guidance about healthy eating and healthy lifestyle choices.  This information is not intended to replace advice given to you by your health care provider. Make sure you discuss any questions you have with your health care provider.  Document Released: 12/19/2017 Document Revised: 01/24/2018 Document Reviewed: 01/24/2018  [employer] Interactive Patient Education © 2018 [employer]
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
## 2018-10-02 LAB
CONV .: NORMAL
CYTOLOGIST CVX/VAG CYTO: NORMAL
CYTOLOGY CVX/VAG DOC THIN PREP: NORMAL
DX ICD CODE: NORMAL
HIV 1 & 2 AB SER-IMP: NORMAL
OTHER STN SPEC: NORMAL
PATH REPORT.FINAL DX SPEC: NORMAL
STAT OF ADQ CVX/VAG CYTO-IMP: NORMAL

## 2018-10-03 ENCOUNTER — TELEPHONE (OUTPATIENT)
Dept: OBSTETRICS AND GYNECOLOGY | Age: 23
End: 2018-10-03

## 2018-10-03 NOTE — TELEPHONE ENCOUNTER
Attempted to call patient and notify that her pap was normal however did not answer and her voicemail was full    Pauline Clements MD  10/3/2018  1:18 PM

## 2018-10-25 ENCOUNTER — OFFICE VISIT (OUTPATIENT)
Dept: FAMILY MEDICINE CLINIC | Facility: CLINIC | Age: 23
End: 2018-10-25

## 2018-10-25 VITALS
WEIGHT: 244 LBS | TEMPERATURE: 98.5 F | DIASTOLIC BLOOD PRESSURE: 72 MMHG | SYSTOLIC BLOOD PRESSURE: 118 MMHG | HEIGHT: 65 IN | HEART RATE: 81 BPM | BODY MASS INDEX: 40.65 KG/M2 | OXYGEN SATURATION: 99 %

## 2018-10-25 DIAGNOSIS — J02.9 PHARYNGITIS, UNSPECIFIED ETIOLOGY: Primary | ICD-10-CM

## 2018-10-25 DIAGNOSIS — M54.2 CERVICAL MUSCLE PAIN: ICD-10-CM

## 2018-10-25 LAB
EXPIRATION DATE: NORMAL
INTERNAL CONTROL: NORMAL
Lab: NORMAL
S PYO AG THROAT QL: NEGATIVE

## 2018-10-25 PROCEDURE — 99213 OFFICE O/P EST LOW 20 MIN: CPT | Performed by: NURSE PRACTITIONER

## 2018-10-25 PROCEDURE — 87880 STREP A ASSAY W/OPTIC: CPT | Performed by: NURSE PRACTITIONER

## 2018-10-25 RX ORDER — METHYLPREDNISOLONE 4 MG/1
TABLET ORAL
Qty: 21 TABLET | Refills: 0 | Status: SHIPPED | OUTPATIENT
Start: 2018-10-25 | End: 2019-04-17

## 2018-10-25 RX ORDER — MELOXICAM 15 MG/1
TABLET ORAL
COMMUNITY
Start: 2018-10-16 | End: 2019-05-23

## 2018-10-25 NOTE — PROGRESS NOTES
"Socorro Galindo is a 23 y.o. female who presents c/o sore throat and diarrhea x 1 day. Also with pain in right shoulder x 4 days.     History of Present Illness   1 day history of ST and diarrhea, associated no other symptoms, has had 3 diarrhea stools.   4 day history of R shoulder pain described as sharp affecting R arm and neck, can't turn head. Has had surgery on shoulder several years ago for bone spur, no recent injury. Woke up with pain, thought had slept wrong, but getting progressively worse.   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   The following portions of the patient's history were reviewed and updated as appropriate: allergies, current medications, past family history, past medical history, past social history, past surgical history and problem list.    Review of Systems   Constitutional: Positive for fever (low grade yesterday). Negative for chills.   HENT: Positive for sore throat. Negative for congestion and sinus pressure.    Respiratory: Negative for cough.    Gastrointestinal: Positive for diarrhea. Negative for abdominal pain, nausea and vomiting.   Musculoskeletal: Positive for arthralgias and neck pain.   Psychiatric/Behavioral: Negative.      /72   Pulse 81   Temp 98.5 °F (36.9 °C) (Oral)   Ht 165.1 cm (65\")   Wt 111 kg (244 lb)   SpO2 99%   BMI 40.60 kg/m²     Objective   Physical Exam   Constitutional: She appears well-developed and well-nourished. No distress.   HENT:   Right Ear: Tympanic membrane normal.   Left Ear: Tympanic membrane normal.   Nose: Nose normal. Right sinus exhibits no maxillary sinus tenderness and no frontal sinus tenderness. Left sinus exhibits no maxillary sinus tenderness and no frontal sinus tenderness.   Mouth/Throat: Uvula is midline and mucous membranes are normal. Posterior oropharyngeal erythema present.   Neck: Neck supple. No JVD present. No tracheal deviation present. No thyromegaly present. "   Cardiovascular: Normal rate, regular rhythm and normal heart sounds.    Pulmonary/Chest: Effort normal and breath sounds normal.   Abdominal: Soft. Bowel sounds are normal. She exhibits no distension. There is no tenderness.   Musculoskeletal:        Right shoulder: Normal. She exhibits no tenderness.        Cervical back: She exhibits decreased range of motion, tenderness and pain. She exhibits no bony tenderness and no spasm.   Lymphadenopathy:     She has no cervical adenopathy.   Neurological: She has normal strength. No sensory deficit.   Skin: Skin is warm and dry.   Nursing note and vitals reviewed.    Assessment/Plan   Problems Addressed this Visit     None      Visit Diagnoses     Pharyngitis, unspecified etiology    -  Primary    Relevant Orders    POC Rapid Strep A (Completed)    Cervical muscle pain        Relevant Medications    MethylPREDNISolone (MEDROL) 4 MG tablet        Rapid strep negative. Favor viral syndrome. otc symptom controllers as needed  Cervical muscle pain--HEP, medrol dose pack to address. Follow up if not settling 1 week. ASAP if any worsening.

## 2019-03-18 ENCOUNTER — TELEPHONE (OUTPATIENT)
Dept: OBSTETRICS AND GYNECOLOGY | Age: 24
End: 2019-03-18

## 2019-03-18 RX ORDER — NORGESTIMATE AND ETHINYL ESTRADIOL 7DAYSX3 LO
1 KIT ORAL DAILY
Qty: 28 TABLET | Refills: 7 | Status: SHIPPED | OUTPATIENT
Start: 2019-03-18 | End: 2019-11-01 | Stop reason: SDUPTHER

## 2019-03-18 NOTE — TELEPHONE ENCOUNTER
PT'S MOTHER CALLING, DAUGHTER NEEDS REFILL ON ORTHO TRY CYCLEN LO CALLED INTO [facility]. THANKS LAST AE WAS 10/2018.

## 2019-04-17 ENCOUNTER — OFFICE VISIT (OUTPATIENT)
Dept: FAMILY MEDICINE CLINIC | Facility: CLINIC | Age: 24
End: 2019-04-17

## 2019-04-17 VITALS
TEMPERATURE: 99.2 F | HEIGHT: 65 IN | WEIGHT: 232 LBS | HEART RATE: 87 BPM | OXYGEN SATURATION: 98 % | DIASTOLIC BLOOD PRESSURE: 80 MMHG | BODY MASS INDEX: 38.65 KG/M2 | SYSTOLIC BLOOD PRESSURE: 122 MMHG

## 2019-04-17 DIAGNOSIS — R39.9 UTI SYMPTOMS: Primary | ICD-10-CM

## 2019-04-17 DIAGNOSIS — R10.9 FLANK PAIN, ACUTE: ICD-10-CM

## 2019-04-17 DIAGNOSIS — R10.32 ABDOMINAL PAIN, LLQ (LEFT LOWER QUADRANT): ICD-10-CM

## 2019-04-17 PROBLEM — S93.401A SPRAIN AND STRAIN OF RIGHT ANKLE: Status: ACTIVE | Noted: 2019-02-11

## 2019-04-17 PROBLEM — S96.911A SPRAIN AND STRAIN OF RIGHT ANKLE: Status: ACTIVE | Noted: 2019-02-11

## 2019-04-17 LAB
BILIRUB BLD-MCNC: NEGATIVE MG/DL
CLARITY, POC: ABNORMAL
COLOR UR: ABNORMAL
GLUCOSE UR STRIP-MCNC: NEGATIVE MG/DL
KETONES UR QL: NEGATIVE
LEUKOCYTE EST, POC: ABNORMAL
NITRITE UR-MCNC: NEGATIVE MG/ML
PH UR: 6.5 [PH] (ref 5–8)
PROT UR STRIP-MCNC: ABNORMAL MG/DL
RBC # UR STRIP: ABNORMAL /UL
SP GR UR: 1.03 (ref 1–1.03)
UROBILINOGEN UR QL: NORMAL

## 2019-04-17 PROCEDURE — 81003 URINALYSIS AUTO W/O SCOPE: CPT | Performed by: NURSE PRACTITIONER

## 2019-04-17 PROCEDURE — 99213 OFFICE O/P EST LOW 20 MIN: CPT | Performed by: NURSE PRACTITIONER

## 2019-04-17 NOTE — PROGRESS NOTES
"Socorro Galindo is a 23 y.o. female who presents for a follow up on UTI. Was initially treated at [facility] and given cefdinir.     History of Present Illness   Symptoms include back pain and vomiting. Some pain after urination. Has 2 antibiotic doses left. No new sexual partners, reports risk factors for STD low. Currently sexually active, no vaginal discharge. No history of kidney stones though do run in her family.   The following portions of the patient's history were reviewed and updated as appropriate: allergies, current medications, past family history, past medical history, past social history, past surgical history and problem list.    Review of Systems   Constitutional: Negative for chills and fever.   Gastrointestinal: Positive for abdominal pain (LLQ after urination). Negative for constipation and diarrhea.   Genitourinary: Positive for frequency. Negative for urinary incontinence, decreased urine volume, difficulty urinating, dysuria, flank pain, hematuria, urgency and vaginal discharge.   Musculoskeletal: Negative.      /80   Pulse 87   Temp 99.2 °F (37.3 °C) (Oral)   Ht 165.1 cm (65\")   Wt 105 kg (232 lb)   SpO2 98%   BMI 38.61 kg/m²     Objective   Physical Exam   Constitutional: She appears well-developed and well-nourished. No distress.   Abdominal: Soft. Bowel sounds are normal. She exhibits no distension. There is tenderness in the left lower quadrant. There is CVA tenderness. There is no rigidity, no rebound, no guarding, no tenderness at McBurney's point and negative Delatorre's sign.   Psychiatric: She has a normal mood and affect. Her speech is normal and behavior is normal.   Nursing note and vitals reviewed.    Assessment/Plan   Problems Addressed this Visit     None      Visit Diagnoses     UTI symptoms    -  Primary    Relevant Orders    POCT urinalysis dipstick, automated (Completed)    Urine Culture - Urine, Urine, Clean Catch    Abdominal pain, LLQ (left lower quadrant)  "       Relevant Orders    CT Abdomen Pelvis Without Contrast    Urine Culture - Urine, Urine, Clean Catch    Flank pain, acute        Relevant Orders    CT Abdomen Pelvis Without Contrast    Urine Culture - Urine, Urine, Clean Catch        Reviewed care everywhere and urine culture showed no growth. Urine today not overly positive for infection, but acute abdominal and flank pain, recommend CT to delineate. FU pending results. Continue omnicef for now.     Results for orders placed or performed in visit on 04/17/19   POCT urinalysis dipstick, automated   Result Value Ref Range    Color Dark Yellow Yellow, Straw, Dark Yellow, [place]    Clarity, UA Cloudy (A) Clear    Specific Gravity  1.030 1.005 - 1.030    pH, Urine 6.5 5.0 - 8.0    Leukocytes Small (1+) (A) Negative    Nitrite, UA Negative Negative    Protein, POC 30 mg/dL (A) Negative mg/dL    Glucose, UA Negative Negative, 1000 mg/dL (3+) mg/dL    Ketones, UA Negative Negative    Urobilinogen, UA Normal Normal    Bilirubin Negative Negative    Blood, UA Trace (A) Negative

## 2019-04-18 ENCOUNTER — TELEPHONE (OUTPATIENT)
Dept: FAMILY MEDICINE CLINIC | Facility: CLINIC | Age: 24
End: 2019-04-18

## 2019-04-18 NOTE — TELEPHONE ENCOUNTER
Received report and I notified patient there was no acute intra-abdominal pathology per report. Pt states she still has pain in abd and the pain is now starting to move to the opposite side of the abd. I offered appt in the morning to FU but pt says she has to work and she would just like a message sent asking what to do from here. please advise.

## 2019-04-19 DIAGNOSIS — R93.2 ABNORMAL LIVER DIAGNOSTIC IMAGING: Primary | ICD-10-CM

## 2019-04-19 RX ORDER — METRONIDAZOLE 500 MG/1
500 TABLET ORAL 3 TIMES DAILY
Qty: 21 TABLET | Refills: 0 | Status: SHIPPED | OUTPATIENT
Start: 2019-04-19 | End: 2019-05-23

## 2019-04-20 LAB
BACTERIA UR CULT: NORMAL
BACTERIA UR CULT: NORMAL
Lab: NORMAL

## 2019-04-22 ENCOUNTER — OFFICE VISIT (OUTPATIENT)
Dept: FAMILY MEDICINE CLINIC | Facility: CLINIC | Age: 24
End: 2019-04-22

## 2019-04-22 VITALS
BODY MASS INDEX: 38.15 KG/M2 | HEIGHT: 65 IN | SYSTOLIC BLOOD PRESSURE: 122 MMHG | WEIGHT: 229 LBS | TEMPERATURE: 98.4 F | HEART RATE: 76 BPM | DIASTOLIC BLOOD PRESSURE: 78 MMHG | OXYGEN SATURATION: 97 %

## 2019-04-22 DIAGNOSIS — K52.9 COLITIS: Primary | ICD-10-CM

## 2019-04-22 PROCEDURE — 99213 OFFICE O/P EST LOW 20 MIN: CPT | Performed by: NURSE PRACTITIONER

## 2019-04-22 NOTE — PROGRESS NOTES
Please call the patient regarding her abnormal result. Urine culture indicates a contaminated specimen. Are you feeling better?

## 2019-04-22 NOTE — PROGRESS NOTES
"Socorro Galindo is a 23 y.o. female who presents for a follow up on abdominal pain.     History of Present Illness   Initially had back pain and vomiting, was treated with cefdinir, though urine cultures have been negative x2. Reports pain now primarily LLQ, off and on, + diarrhea, 1 episode of low back pain, + nausea. No mucus or blood to the stools. RLQ pain has resolved. No fever or chills. Does have epigastric discomfort, but this has been ongoing since she had her gallbladder removed, nothing new.   The following portions of the patient's history were reviewed and updated as appropriate: allergies, current medications, past family history, past medical history, past social history, past surgical history and problem list.    Review of Systems   Constitutional: Negative for chills and fever.   Respiratory: Negative.    Cardiovascular: Negative.    Gastrointestinal: Positive for abdominal pain, diarrhea and nausea. Negative for abdominal distention, anal bleeding, blood in stool, constipation, rectal pain and vomiting.   Genitourinary: Positive for frequency (resolved). Negative for flank pain and urgency.   Musculoskeletal: Positive for back pain.   Skin: Negative.      /78   Pulse 76   Temp 98.4 °F (36.9 °C) (Oral)   Ht 165.1 cm (65\")   Wt 104 kg (229 lb)   LMP 04/22/2019 (Exact Date)   SpO2 97%   BMI 38.11 kg/m²     Objective   Physical Exam   Constitutional: She is oriented to person, place, and time. She appears well-developed and well-nourished.   Cardiovascular: Normal rate, regular rhythm and normal heart sounds.   Pulmonary/Chest: Effort normal and breath sounds normal.   Abdominal: Soft. Bowel sounds are normal. She exhibits no distension. There is tenderness (minimal, improved greatly) in the left lower quadrant.   Musculoskeletal:        Lumbar back: She exhibits normal range of motion, no tenderness, no bony tenderness, no pain and no spasm.   Neurological: She is alert and " oriented to person, place, and time.   Skin: Skin is warm and dry.   Psychiatric: She has a normal mood and affect. Her behavior is normal. Judgment and thought content normal.   Nursing note and vitals reviewed.    Assessment/Plan   Problems Addressed this Visit     None      Visit Diagnoses     Colitis    -  Primary        CT was negative for acute intra abdominal pathology, was started on metronidazole as diverticulitis suspected by exam. Exam greatly improved today. Finish antibiotics and follow up if all not settling. 1 week.

## 2019-04-25 ENCOUNTER — TELEPHONE (OUTPATIENT)
Dept: FAMILY MEDICINE CLINIC | Facility: CLINIC | Age: 24
End: 2019-04-25

## 2019-04-25 DIAGNOSIS — R93.2 ABNORMAL LIVER DIAGNOSTIC IMAGING: Primary | ICD-10-CM

## 2019-04-25 NOTE — PROGRESS NOTES
Please call the patient regarding her result. US did not characterize the liver lesion well. Recommend MRI with contrast. Can we set this up?

## 2019-04-26 NOTE — TELEPHONE ENCOUNTER
I apologize, it is at [place] on [facility]. I had two patients at the same time going for the same test.

## 2019-04-26 NOTE — TELEPHONE ENCOUNTER
This was supposed to be scheduled at an open MRI at [place] or [facility] on [facility] per patients request. Will they read an MRI wo?

## 2019-04-29 ENCOUNTER — TELEPHONE (OUTPATIENT)
Dept: FAMILY MEDICINE CLINIC | Facility: CLINIC | Age: 24
End: 2019-04-29

## 2019-04-29 DIAGNOSIS — R93.2 ABNORMAL LIVER DIAGNOSTIC IMAGING: Primary | ICD-10-CM

## 2019-05-06 ENCOUNTER — TELEPHONE (OUTPATIENT)
Dept: FAMILY MEDICINE CLINIC | Facility: CLINIC | Age: 24
End: 2019-05-06

## 2019-05-06 ENCOUNTER — TELEPHONE (OUTPATIENT)
Dept: GASTROENTEROLOGY | Facility: CLINIC | Age: 24
End: 2019-05-06

## 2019-05-06 DIAGNOSIS — R93.2 ABNORMAL LIVER DIAGNOSTIC IMAGING: Primary | ICD-10-CM

## 2019-05-06 NOTE — TELEPHONE ENCOUNTER
----- Message from Bob Tony sent at 5/6/2019  3:40 PM EDT -----  Regarding: pt called   Contact: [phone]  Pt is calling asking to speak with a nurse, to see if she needs to get in right away or can wait.

## 2019-05-06 NOTE — TELEPHONE ENCOUNTER
Call to pt.  States saw pcp today and was advised that had vascular malformation in liver (see media tab).  Pt asking if should be seen in this office right away, or if can wait.    Question to Dr Campuzano.

## 2019-05-07 NOTE — TELEPHONE ENCOUNTER
Called pt and advised per Dr Campuzano that she recommends to repeat scan as recommended by radiology (pcp to order)- if there are concerns on repeat would have pcp refer her back to us. Pt verb understanding.

## 2019-05-07 NOTE — TELEPHONE ENCOUNTER
Would repeat scan as recommended by radiology (pcp to order) - if there are concerns on repeat, would have pcp refer her back to us   Skin normal color for race, warm, dry and intact. No evidence of rash.

## 2019-05-07 NOTE — TELEPHONE ENCOUNTER
Pt last seen 11/1/13 per [facility].  No referral has been placed by PCP office.      Message to Dr Campuzano.

## 2019-05-17 ENCOUNTER — TELEPHONE (OUTPATIENT)
Dept: GASTROENTEROLOGY | Facility: CLINIC | Age: 24
End: 2019-05-17

## 2019-05-17 NOTE — TELEPHONE ENCOUNTER
"Pt is sched for new pt appt with Dr Barlow on 5/23/19, but she is already established with Dr Campuzano ([facility]). Spoke with pt, she states she called Dr Campuzano's office about \"abnormal scan\"  but they just recommended she repeat her \"scan\" in 4-6 months. She states she will be back in school at that time and does not want to wait.  Informed her we need to ask Dr Barlow if he would be able to accept as a new pt and that it is possible may need to cancel appt.  She verbalized understanding.    All d/w Dr Barlow, he will not be able to accept as a new pt. LVM with pt stating will need to cancel appt with Dr Barlow and she needs to contact Dr Campuzano's office.  "

## 2019-05-23 ENCOUNTER — OFFICE VISIT (OUTPATIENT)
Dept: GASTROENTEROLOGY | Facility: CLINIC | Age: 24
End: 2019-05-23

## 2019-05-23 VITALS
WEIGHT: 224.2 LBS | DIASTOLIC BLOOD PRESSURE: 80 MMHG | BODY MASS INDEX: 38.28 KG/M2 | HEIGHT: 64 IN | TEMPERATURE: 99.1 F | SYSTOLIC BLOOD PRESSURE: 130 MMHG

## 2019-05-23 DIAGNOSIS — R10.13 EPIGASTRIC PAIN: ICD-10-CM

## 2019-05-23 DIAGNOSIS — K76.89 LIVER NODULE: Primary | ICD-10-CM

## 2019-05-23 DIAGNOSIS — K59.04 CHRONIC IDIOPATHIC CONSTIPATION: ICD-10-CM

## 2019-05-23 LAB
ALBUMIN SERPL-MCNC: 4.1 G/DL (ref 3.5–5.2)
ALBUMIN/GLOB SERPL: 1.2 G/DL
ALP SERPL-CCNC: 90 U/L (ref 39–117)
ALT SERPL-CCNC: 19 U/L (ref 1–33)
AST SERPL-CCNC: 19 U/L (ref 1–32)
BASOPHILS # BLD AUTO: 0.06 10*3/MM3 (ref 0–0.2)
BASOPHILS NFR BLD AUTO: 0.8 % (ref 0–1.5)
BILIRUB SERPL-MCNC: 0.3 MG/DL (ref 0.2–1.2)
BUN SERPL-MCNC: 10 MG/DL (ref 6–20)
BUN/CREAT SERPL: 16.9 (ref 7–25)
CALCIUM SERPL-MCNC: 10.1 MG/DL (ref 8.6–10.5)
CHLORIDE SERPL-SCNC: 105 MMOL/L (ref 98–107)
CO2 SERPL-SCNC: 23.7 MMOL/L (ref 22–29)
CREAT SERPL-MCNC: 0.59 MG/DL (ref 0.57–1)
EOSINOPHIL # BLD AUTO: 0.47 10*3/MM3 (ref 0–0.4)
EOSINOPHIL NFR BLD AUTO: 6.5 % (ref 0.3–6.2)
ERYTHROCYTE [DISTWIDTH] IN BLOOD BY AUTOMATED COUNT: 13.6 % (ref 12.3–15.4)
GLOBULIN SER CALC-MCNC: 3.3 GM/DL
GLUCOSE SERPL-MCNC: 79 MG/DL (ref 65–99)
HCT VFR BLD AUTO: 43 % (ref 34–46.6)
HGB BLD-MCNC: 13.6 G/DL (ref 12–15.9)
IMM GRANULOCYTES # BLD AUTO: 0.02 10*3/MM3 (ref 0–0.05)
IMM GRANULOCYTES NFR BLD AUTO: 0.3 % (ref 0–0.5)
LIPASE SERPL-CCNC: 54 U/L (ref 13–60)
LYMPHOCYTES # BLD AUTO: 1.64 10*3/MM3 (ref 0.7–3.1)
LYMPHOCYTES NFR BLD AUTO: 22.8 % (ref 19.6–45.3)
MCH RBC QN AUTO: 26.7 PG (ref 26.6–33)
MCHC RBC AUTO-ENTMCNC: 31.6 G/DL (ref 31.5–35.7)
MCV RBC AUTO: 84.5 FL (ref 79–97)
MONOCYTES # BLD AUTO: 0.64 10*3/MM3 (ref 0.1–0.9)
MONOCYTES NFR BLD AUTO: 8.9 % (ref 5–12)
NEUTROPHILS # BLD AUTO: 4.35 10*3/MM3 (ref 1.7–7)
NEUTROPHILS NFR BLD AUTO: 60.7 % (ref 42.7–76)
NRBC BLD AUTO-RTO: 0 /100 WBC (ref 0–0.2)
PLATELET # BLD AUTO: 422 10*3/MM3 (ref 140–450)
POTASSIUM SERPL-SCNC: 4.5 MMOL/L (ref 3.5–5.2)
PROT SERPL-MCNC: 7.4 G/DL (ref 6–8.5)
RBC # BLD AUTO: 5.09 10*6/MM3 (ref 3.77–5.28)
SODIUM SERPL-SCNC: 141 MMOL/L (ref 136–145)
WBC # BLD AUTO: 7.18 10*3/MM3 (ref 3.4–10.8)

## 2019-05-23 PROCEDURE — 99203 OFFICE O/P NEW LOW 30 MIN: CPT | Performed by: INTERNAL MEDICINE

## 2019-05-23 RX ORDER — PANTOPRAZOLE SODIUM 40 MG/1
40 TABLET, DELAYED RELEASE ORAL DAILY
Qty: 30 TABLET | Refills: 5 | Status: SHIPPED | OUTPATIENT
Start: 2019-05-23 | End: 2021-03-15

## 2019-05-23 NOTE — PROGRESS NOTES
Chief Complaint   Patient presents with   • Abdominal Pain   • Nausea   • Constipation   • Diarrhea       Carina Galindo is a  24 y.o. female here for a follow up visit for abdominal pain.     C/o epigstric pain that starts in the center of the chest and radiates to the flanks - happens often at night, not related to eating.  Has not seen blood in stool.    BMs are mostly constipation - has loose stools with fatty foods since cholecystectomy.  Weight is stable.  She had a scope in 2013 for similar symptoms that showed gastritis.    She had a noncontrast CT was performed that showed a solitary liver lesion.  Follow-up MRI shows this to be a 2.5 cm vascular lesion in the dome of her liver.  It has a slightly thickened capsule which is atypical and therefore short-term follow-up was recommended.  Differential was likely hemangioma.  HPI  Past Medical History:   Diagnosis Date   • Abdominal pain    • Anxiety    • Asthma    • Diarrhea    • Iron deficiency anemia    • Vomiting      Past Surgical History:   Procedure Laterality Date   • ADENOIDECTOMY     • CHOLECYSTECTOMY WITH INTRAOPERATIVE CHOLANGIOGRAM N/A 7/14/2016    Procedure: CHOLECYSTECTOMY LAPAROSCOPIC INTRAOPERATIVE CHOLANGIOGRAM;  Surgeon: Malathi Lozano MD;  [place] [facility] [place] [facility] [place];  Service:    • ENDOSCOPY  11/01/2013    Gastritis    • NASAL SEPTUM SURGERY     • SHOULDER SURGERY     • TONSILLECTOMY      x 2 due to regrowth   • WISDOM TOOTH EXTRACTION         Current Outpatient Medications:   •  albuterol (VENTOLIN HFA) 108 (90 Base) MCG/ACT inhaler, Inhale 2 puffs Every 4 (Four) Hours As Needed for Wheezing., Disp: 18 g, Rfl: 1  •  citalopram (CeleXA) 40 MG tablet, Take 60 mg by mouth., Disp: , Rfl:   •  montelukast (SINGULAIR) 10 MG tablet, Take 10 mg by mouth Daily., Disp: , Rfl: 0  •  norgestimate-ethinyl estradiol (ORTHO TRI-CYCLEN LO) 0.18/0.215/0.25 MG-25 MCG per tablet, Take 1 tablet by mouth Daily., Disp: 28 tablet, Rfl: 7  •  traZODone (DESYREL) 50 MG  tablet, TAKE 1-2 TABLET BY MOUTH AT BEDTIME FOR INSOMNIA, Disp: , Rfl:   •  pantoprazole (PROTONIX) 40 MG EC tablet, Take 1 tablet by mouth Daily., Disp: 30 tablet, Rfl: 5  PRN Meds:.  Allergies   Allergen Reactions   • Eggs Or Egg-Derived Products Swelling     Eyes swell shut, has some eggs cooked in food.   • Nitrofurantoin      Unsure     • Other      FOOD ALLERGIES: see list : peanuts and ranch dressing   • Amoxicillin Rash   • Ciprofloxacin Rash   • Erythromycin Rash   • Ibuprofen Rash   • Sulfa Antibiotics Rash   • Triamcinolone Rash     Social History     Socioeconomic History   • Marital status: Single     Spouse name: Not on file   • Number of children: Not on file   • Years of education: Not on file   • Highest education level: Not on file   Tobacco Use   • Smoking status: Never Smoker   • Smokeless tobacco: Never Used   Substance and Sexual Activity   • Alcohol use: No     Frequency: Never   • Drug use: No   • Sexual activity: Defer     Birth control/protection: Pill     Family History   Problem Relation Age of Onset   • Gallbladder disease Mother 30   • Rheum arthritis Mother    • Hypertension Mother    • Endometriosis Mother    • Diabetes Father    • Breast cancer Maternal Grandmother    • Heart disease Maternal Grandfather      Review of Systems   Constitutional: Negative for appetite change and unexpected weight change.   Gastrointestinal: Positive for abdominal pain and constipation. Negative for blood in stool.   All other systems reviewed and are negative.    Vitals:    05/23/19 0952   BP: 130/80   Temp: 99.1 °F (37.3 °C)         05/23/19  0952   Weight: 102 kg (224 lb 3.2 oz)     Physical Exam   Constitutional: She appears well-developed and well-nourished.   HENT:   Head: Normocephalic and atraumatic.   Eyes: No scleral icterus.   Abdominal: Soft. She exhibits no distension and no mass. There is tenderness.       Neurological: She is alert.   Skin: Skin is warm and dry.   Psychiatric: She has a  normal mood and affect.     No images are attached to the encounter.  Diagnoses and all orders for this visit:    Liver nodule  -     MRI Abdomen With & Without Contrast; Future    Epigastric pain  -     CBC & Differential  -     Comprehensive Metabolic Panel  -     Lipase    Chronic idiopathic constipation    Other orders  -     pantoprazole (PROTONIX) 40 MG EC tablet; Take 1 tablet by mouth Daily.    Plan-  -Given the location of her pain we will try acid suppressant.  She had a previous EGD for similar circumstances and at this time I do not think that a repeat exam is warranted.  Her pain persist we may consider repeating it.  -Given her constipation we will try Benefiber daily.  MiraLAX in the past was caused to frequent bowel movements.  -We will check labs today.  -Repeat MRI to follow-up on liver lesion in 6 months.  We discussed that is likely hemangioma but will follow-up given new finding and to see if it is changed or grown.  -Continue efforts at weight loss with diet modification and continued exercise.  She may benefit from eating multiple small meals per day given her abdominal issues.

## 2019-05-24 ENCOUNTER — TELEPHONE (OUTPATIENT)
Dept: GASTROENTEROLOGY | Facility: CLINIC | Age: 24
End: 2019-05-24

## 2019-05-29 ENCOUNTER — TELEPHONE (OUTPATIENT)
Dept: FAMILY MEDICINE CLINIC | Facility: CLINIC | Age: 24
End: 2019-05-29

## 2019-05-29 RX ORDER — EPINEPHRINE 0.3 MG/.3ML
0.3 INJECTION SUBCUTANEOUS ONCE
Qty: 1 EACH | Refills: 0 | Status: SHIPPED | OUTPATIENT
Start: 2019-05-29 | End: 2019-05-29

## 2019-07-19 ENCOUNTER — OFFICE VISIT (OUTPATIENT)
Dept: GASTROENTEROLOGY | Facility: CLINIC | Age: 24
End: 2019-07-19

## 2019-07-19 VITALS
BODY MASS INDEX: 37.9 KG/M2 | HEIGHT: 64 IN | DIASTOLIC BLOOD PRESSURE: 74 MMHG | SYSTOLIC BLOOD PRESSURE: 128 MMHG | TEMPERATURE: 98.1 F | WEIGHT: 222 LBS

## 2019-07-19 DIAGNOSIS — R10.10 PAIN OF UPPER ABDOMEN: ICD-10-CM

## 2019-07-19 DIAGNOSIS — Z87.19 HISTORY OF ACUTE GASTRITIS: ICD-10-CM

## 2019-07-19 DIAGNOSIS — K21.9 GASTROESOPHAGEAL REFLUX DISEASE, ESOPHAGITIS PRESENCE NOT SPECIFIED: Primary | ICD-10-CM

## 2019-07-19 DIAGNOSIS — K59.00 CONSTIPATION, UNSPECIFIED CONSTIPATION TYPE: ICD-10-CM

## 2019-07-19 PROCEDURE — 99214 OFFICE O/P EST MOD 30 MIN: CPT | Performed by: NURSE PRACTITIONER

## 2019-07-19 NOTE — PROGRESS NOTES
Chief Complaint   Patient presents with   • Nausea     caused by meds        Carina Galindo is a  24 y.o. female here for a follow up visit for GERD.    HPI  23 yo f presents today accompanied by her mother for follow up visit for GERD. She is a patient of Dr. Campuzano. She was last seen in the office on 5/23/19. She has hx GERD/gastritis and admits she is doing a lot better with protonix 40 mg daily. She also has hx constipation but she thinks the protonix may be helping that too since she no longer has to take anything else to have a BM. She denies any dysphagia, reflux, abd pain, N&V, diarrhea, constipation, rectal bleeding or melena. She admits her appetite is ok and her weight has gone down 10 lbs since April.       Past Medical History:   Diagnosis Date   • Abdominal pain    • Anxiety    • Asthma    • Diarrhea    • GERD (gastroesophageal reflux disease) 2010   • Iron deficiency anemia    • Vomiting        Past Surgical History:   Procedure Laterality Date   • ADENOIDECTOMY     • CHOLECYSTECTOMY  2016 or 2017   • CHOLECYSTECTOMY WITH INTRAOPERATIVE CHOLANGIOGRAM N/A 7/14/2016    Procedure: CHOLECYSTECTOMY LAPAROSCOPIC INTRAOPERATIVE CHOLANGIOGRAM;  Surgeon: Malathi Lozano MD;  [place] [facility] [place] [facility] [place];  Service:    • COLONOSCOPY  2010   • ENDOSCOPY  11/01/2013    Gastritis    • NASAL SEPTUM SURGERY     • SHOULDER SURGERY     • TONSILLECTOMY      x 2 due to regrowth   • UPPER GASTROINTESTINAL ENDOSCOPY  2010   • WISDOM TOOTH EXTRACTION         Scheduled Meds:    Continuous Infusions:  No current facility-administered medications for this visit.     PRN Meds:.    Allergies   Allergen Reactions   • Eggs Or Egg-Derived Products Swelling     Eyes swell shut, has some eggs cooked in food.   • Nitrofurantoin      Unsure     • Other      FOOD ALLERGIES: see list : peanuts and ranch dressing   • Amoxicillin Rash   • Ciprofloxacin Rash   • Erythromycin Rash   • Ibuprofen Rash   • Sulfa Antibiotics Rash   • Triamcinolone  Rash       Social History     Socioeconomic History   • Marital status: Single     Spouse name: Not on file   • Number of children: Not on file   • Years of education: Not on file   • Highest education level: Not on file   Tobacco Use   • Smoking status: Never Smoker   • Smokeless tobacco: Never Used   Substance and Sexual Activity   • Alcohol use: No     Frequency: Never   • Drug use: No   • Sexual activity: Yes     Partners: Male     Birth control/protection: Condom, OCP       Family History   Problem Relation Age of Onset   • Gallbladder disease Mother 30   • Rheum arthritis Mother    • Hypertension Mother    • Endometriosis Mother    • Irritable bowel syndrome Mother    • Pancreatitis Mother    • Diabetes Father    • Breast cancer Maternal Grandmother    • Heart disease Maternal Grandfather        Review of Systems   Constitutional: Negative for appetite change, chills, diaphoresis, fatigue, fever and unexpected weight change.   HENT: Negative for nosebleeds, postnasal drip, sore throat, trouble swallowing and voice change.    Respiratory: Negative for cough, choking, chest tightness, shortness of breath and wheezing.    Cardiovascular: Negative for chest pain, palpitations and leg swelling.   Gastrointestinal: Negative for abdominal distention, abdominal pain, anal bleeding, blood in stool, constipation, diarrhea, nausea, rectal pain and vomiting.   Endocrine: Negative for polydipsia, polyphagia and polyuria.   Musculoskeletal: Negative for gait problem.   Skin: Negative for rash and wound.   Allergic/Immunologic: Negative for food allergies.   Neurological: Negative for dizziness, speech difficulty and light-headedness.   Psychiatric/Behavioral: Negative for confusion, self-injury, sleep disturbance and suicidal ideas.       Vitals:    07/19/19 0952   BP: 128/74   Temp: 98.1 °F (36.7 °C)       Physical Exam   Constitutional: She is oriented to person, place, and time. She appears well-developed and  well-nourished. She does not appear ill. No distress.   HENT:   Head: Normocephalic.   Eyes: Pupils are equal, round, and reactive to light.   Cardiovascular: Normal rate, regular rhythm and normal heart sounds.   Pulmonary/Chest: Effort normal and breath sounds normal.   Abdominal: Soft. Bowel sounds are normal. She exhibits no distension and no mass. There is no hepatosplenomegaly. There is no tenderness. There is no rebound and no guarding. No hernia.   Musculoskeletal: Normal range of motion.   Neurological: She is alert and oriented to person, place, and time.   Skin: Skin is warm and dry.   Psychiatric: She has a normal mood and affect. Her speech is normal and behavior is normal. Judgment normal.       No images are attached to the encounter.    Assessment & Plan     1. Gastroesophageal reflux disease, esophagitis presence not specified    2. Pain of upper abdomen    3. History of acute gastritis    4. Constipation, unspecified constipation type      GERD/gastritis seems much better with protonix 40 mg daily. Continue it for now. Continue GERD precautions. Constipation is much improved with diet. Continue a high fiber diet. Call office with any issues. Follow up with me in 6 months.

## 2019-10-21 ENCOUNTER — HOSPITAL ENCOUNTER (OUTPATIENT)
Dept: MRI IMAGING | Facility: HOSPITAL | Age: 24
Discharge: HOME | End: 2019-10-21
Admitting: INTERNAL MEDICINE

## 2019-10-21 DIAGNOSIS — K76.89 LIVER NODULE: ICD-10-CM

## 2019-10-21 PROCEDURE — A9581 GADOXETATE DISODIUM INJ: HCPCS | Performed by: INTERNAL MEDICINE

## 2019-10-21 PROCEDURE — 74183 MRI ABD W/O CNTR FLWD CNTR: CPT

## 2019-10-21 PROCEDURE — 25010000002 GADOXETATE 0.25 MOL/L SOLUTION: Performed by: INTERNAL MEDICINE

## 2019-10-21 RX ADMIN — GADOXETATE DISODIUM 9 ML: 181.43 INJECTION, SOLUTION INTRAVENOUS at 12:13

## 2019-10-28 ENCOUNTER — TELEPHONE (OUTPATIENT)
Dept: GASTROENTEROLOGY | Facility: CLINIC | Age: 24
End: 2019-10-28

## 2019-10-28 NOTE — TELEPHONE ENCOUNTER
----- Message from Martita Zamora sent at 10/28/2019  3:24 PM EDT -----  Regarding: results  Contact: [phone]  Pt is calling regarding her MRI results.

## 2019-10-29 ENCOUNTER — PREP FOR SURGERY (OUTPATIENT)
Dept: OTHER | Facility: HOSPITAL | Age: 24
End: 2019-10-29

## 2019-10-29 ENCOUNTER — TELEPHONE (OUTPATIENT)
Dept: GASTROENTEROLOGY | Facility: CLINIC | Age: 24
End: 2019-10-29

## 2019-10-29 DIAGNOSIS — K76.89 LIVER NODULE: Primary | ICD-10-CM

## 2019-10-29 NOTE — TELEPHONE ENCOUNTER
MRI discussed with Dr. Arguello at the time of her read and discussed with the patient today.  Atypical appearing lesion which is not changed in size but is difficult to characterize based on its imaging characteristics.  Not definitively a benign lesion.  Biopsy is recommended-discussed this with the patient and she is agreeable with proceeding.  Will put in an order for CT-guided biopsy

## 2019-11-01 RX ORDER — NORGESTIMATE AND ETHINYL ESTRADIOL
KIT
Qty: 28 TABLET | Refills: 6 | Status: SHIPPED | OUTPATIENT
Start: 2019-11-01 | End: 2019-11-18

## 2019-11-08 RX ORDER — AZITHROMYCIN 250 MG/1
TABLET, FILM COATED ORAL
COMMUNITY
Start: 2019-09-09 | End: 2019-11-08

## 2019-11-08 RX ORDER — ACETAMINOPHEN 325 MG/1
325 TABLET ORAL EVERY 6 HOURS PRN
COMMUNITY

## 2019-11-15 ENCOUNTER — HOSPITAL ENCOUNTER (OUTPATIENT)
Dept: CT IMAGING | Facility: HOSPITAL | Age: 24
Discharge: HOME | End: 2019-11-15
Admitting: INTERNAL MEDICINE

## 2019-11-15 VITALS
SYSTOLIC BLOOD PRESSURE: 99 MMHG | WEIGHT: 221 LBS | OXYGEN SATURATION: 97 % | HEART RATE: 93 BPM | RESPIRATION RATE: 16 BRPM | BODY MASS INDEX: 37.73 KG/M2 | HEIGHT: 64 IN | TEMPERATURE: 97.1 F | DIASTOLIC BLOOD PRESSURE: 61 MMHG

## 2019-11-15 DIAGNOSIS — K76.89 LIVER NODULE: ICD-10-CM

## 2019-11-15 LAB
BASOPHILS # BLD AUTO: 0.07 10*3/MM3 (ref 0–0.2)
BASOPHILS NFR BLD AUTO: 0.8 % (ref 0–1.5)
DEPRECATED RDW RBC AUTO: 39.5 FL (ref 37–54)
EOSINOPHIL # BLD AUTO: 0.51 10*3/MM3 (ref 0–0.4)
EOSINOPHIL NFR BLD AUTO: 5.9 % (ref 0.3–6.2)
ERYTHROCYTE [DISTWIDTH] IN BLOOD BY AUTOMATED COUNT: 12.9 % (ref 12.3–15.4)
HCT VFR BLD AUTO: 41 % (ref 34–46.6)
HGB BLD-MCNC: 13.7 G/DL (ref 12–15.9)
IMM GRANULOCYTES # BLD AUTO: 0.04 10*3/MM3 (ref 0–0.05)
IMM GRANULOCYTES NFR BLD AUTO: 0.5 % (ref 0–0.5)
INR PPP: 0.9 (ref 0.8–1.2)
LYMPHOCYTES # BLD AUTO: 2.07 10*3/MM3 (ref 0.7–3.1)
LYMPHOCYTES NFR BLD AUTO: 23.8 % (ref 19.6–45.3)
MCH RBC QN AUTO: 28.1 PG (ref 26.6–33)
MCHC RBC AUTO-ENTMCNC: 33.4 G/DL (ref 31.5–35.7)
MCV RBC AUTO: 84 FL (ref 79–97)
MONOCYTES # BLD AUTO: 0.58 10*3/MM3 (ref 0.1–0.9)
MONOCYTES NFR BLD AUTO: 6.7 % (ref 5–12)
NEUTROPHILS # BLD AUTO: 5.43 10*3/MM3 (ref 1.7–7)
NEUTROPHILS NFR BLD AUTO: 62.3 % (ref 42.7–76)
NRBC BLD AUTO-RTO: 0.1 /100 WBC (ref 0–0.2)
PLATELET # BLD AUTO: 374 10*3/MM3 (ref 140–450)
PMV BLD AUTO: 9.4 FL (ref 6–12)
PROTHROMBIN TIME: 11.4 SECONDS (ref 12.8–15.2)
RBC # BLD AUTO: 4.88 10*6/MM3 (ref 3.77–5.28)
WBC NRBC COR # BLD: 8.7 10*3/MM3 (ref 3.4–10.8)

## 2019-11-15 PROCEDURE — 25010000002 ONDANSETRON PER 1 MG: Performed by: RADIOLOGY

## 2019-11-15 PROCEDURE — 85025 COMPLETE CBC W/AUTO DIFF WBC: CPT | Performed by: INTERNAL MEDICINE

## 2019-11-15 PROCEDURE — 25010000003 LIDOCAINE 1 % SOLUTION: Performed by: RADIOLOGY

## 2019-11-15 PROCEDURE — 77012 CT SCAN FOR NEEDLE BIOPSY: CPT

## 2019-11-15 PROCEDURE — 85610 PROTHROMBIN TIME: CPT

## 2019-11-15 PROCEDURE — 88313 SPECIAL STAINS GROUP 2: CPT | Performed by: INTERNAL MEDICINE

## 2019-11-15 PROCEDURE — 88307 TISSUE EXAM BY PATHOLOGIST: CPT | Performed by: INTERNAL MEDICINE

## 2019-11-15 RX ORDER — ALPRAZOLAM 0.5 MG/1
0.5 TABLET ORAL ONCE
Status: COMPLETED | OUTPATIENT
Start: 2019-11-15 | End: 2019-11-15

## 2019-11-15 RX ORDER — HYDROCODONE BITARTRATE AND ACETAMINOPHEN 5; 325 MG/1; MG/1
1 TABLET ORAL EVERY 6 HOURS PRN
Status: DISCONTINUED | OUTPATIENT
Start: 2019-11-15 | End: 2019-11-16 | Stop reason: HOSPADM

## 2019-11-15 RX ORDER — SODIUM CHLORIDE 0.9 % (FLUSH) 0.9 %
3 SYRINGE (ML) INJECTION EVERY 12 HOURS SCHEDULED
Status: DISCONTINUED | OUTPATIENT
Start: 2019-11-15 | End: 2019-11-16 | Stop reason: HOSPADM

## 2019-11-15 RX ORDER — LIDOCAINE HYDROCHLORIDE 10 MG/ML
20 INJECTION, SOLUTION INFILTRATION; PERINEURAL ONCE
Status: COMPLETED | OUTPATIENT
Start: 2019-11-15 | End: 2019-11-15

## 2019-11-15 RX ORDER — ONDANSETRON 2 MG/ML
4 INJECTION INTRAMUSCULAR; INTRAVENOUS EVERY 6 HOURS PRN
Status: DISCONTINUED | OUTPATIENT
Start: 2019-11-15 | End: 2019-11-16 | Stop reason: HOSPADM

## 2019-11-15 RX ORDER — SODIUM CHLORIDE 0.9 % (FLUSH) 0.9 %
1-10 SYRINGE (ML) INJECTION AS NEEDED
Status: DISCONTINUED | OUTPATIENT
Start: 2019-11-15 | End: 2019-11-16 | Stop reason: HOSPADM

## 2019-11-15 RX ADMIN — LIDOCAINE HYDROCHLORIDE 20 ML: 10 INJECTION, SOLUTION INFILTRATION; PERINEURAL at 10:30

## 2019-11-15 RX ADMIN — HYDROCODONE BITARTATE AND ACETAMINOPHEN 1 TABLET: 5; 325 TABLET ORAL at 11:46

## 2019-11-15 RX ADMIN — Medication 3 ML: at 09:06

## 2019-11-15 RX ADMIN — ONDANSETRON 4 MG: 2 INJECTION INTRAMUSCULAR; INTRAVENOUS at 12:13

## 2019-11-15 RX ADMIN — ALPRAZOLAM 0.5 MG: 0.5 TABLET ORAL at 09:06

## 2019-11-15 RX ADMIN — HYDROCODONE BITARTATE AND ACETAMINOPHEN 1 TABLET: 5; 325 TABLET ORAL at 11:07

## 2019-11-15 NOTE — PROGRESS NOTES
Pt asked to skip this set of VS due to pain.  Will monitor and check on pt in 30 minutes. Vs have been stable. Pt resting with eyes closed.

## 2019-11-15 NOTE — H&P
Name: Carina Galindo ADMIT: 11/15/2019   : 1995  PCP: Jozef Ivey DO    MRN: [id number] LOS: 0 days   AGE/SEX: 24 y.o. female  ROOM: Room/bed info not found       Chief complaint   Patient is a 24 y.o. female presents with liver lesion mow for biopsy.     Past Surgical History:  Past Surgical History:   Procedure Laterality Date   • ADENOIDECTOMY     • CHOLECYSTECTOMY   or    • CHOLECYSTECTOMY WITH INTRAOPERATIVE CHOLANGIOGRAM N/A 2016    Procedure: CHOLECYSTECTOMY LAPAROSCOPIC INTRAOPERATIVE CHOLANGIOGRAM;  Surgeon: Malathi Lozano MD;  [place] [facility] [place] [facility] [place]:    • COLONOSCOPY     • ENDOSCOPY  2013    Gastritis    • NASAL SEPTUM SURGERY     • SHOULDER SURGERY     • TONSILLECTOMY      x 2 due to regrowth   • UPPER GASTROINTESTINAL ENDOSCOPY     • WISDOM TOOTH EXTRACTION         Past Medical History:  Past Medical History:   Diagnosis Date   • Abdominal pain    • Anxiety    • Asthma    • Diarrhea    • GERD (gastroesophageal reflux disease)    • Iron deficiency anemia    • Vomiting        Home Medications:    (Not in a hospital admission)    Allergies:  Eggs or egg-derived products; Other; Nitrofurantoin; Amoxicillin; Ciprofloxacin; Erythromycin; Ibuprofen; Sulfa antibiotics; and Triamcinolone    Family History:  Family History   Problem Relation Age of Onset   • Gallbladder disease Mother 30   • Rheum arthritis Mother    • Hypertension Mother    • Endometriosis Mother    • Irritable bowel syndrome Mother    • Pancreatitis Mother    • Diabetes Father    • Breast cancer Maternal Grandmother    • Heart disease Maternal Grandfather        Social History:  Social History     Tobacco Use   • Smoking status: Never Smoker   • Smokeless tobacco: Never Used   Substance Use Topics   • Alcohol use: No     Frequency: Never   • Drug use: No        Objective     Physical Exam:   OK for Biopsy    Vital Signs  Temp:  [97.1 °F (36.2 °C)] 97.1 °F (36.2 °C)  Heart Rate:   [65] 65  Resp:  [16] 16  BP: (122)/(84) 122/84    Anticipated Surgical Procedure:  CT Guided Liver Biopsy    The risks, benefits and alternatives of this procedure have been discussed with the patient or responsible party: Yes        Dayne Wharton MD  11/15/19  9:04 AM

## 2019-11-15 NOTE — DISCHARGE INSTRUCTIONS
EDUCATION /DISCHARGE INSTRUCTIONS  CT/US guided biopsy:  A biopsy is a procedure done to remove tissue for further analysis.  Before images are taken to locate the target area.  Images can be obtained using ultrasound, CT or MRI.  A physician will clean your skin with antiseptic soap, place a sterile towel around the site and administer a local anesthetic to numb the area.  The physician will then insert a special needle.  Sometimes images are taken of the needle after it is inserted to ensure the needle is in the correct area to be biopsied.   A sample is obtained and sent to the laboratory for study.  Occasionally the laboratory is unable to make a diagnosis from the sample and the procedure may need to be repeated.  Within a week the radiologist will send a report to your physician.  A pathologist will also examine the tissue and send a report.    Risks of the procedure include but are not limited to:   *  Bleeding    *  Infection   *  Puncture of surrounding organs *  Death     *  Lung collapse if the biopsy is near the chest which may require insertion of a      chest tube to re-inflate the lung if severe.    Benefits of the procedure:  Using x-ray helps to locate the area that requires a biopsy. The procedure is less invasive than a surgical procedure, there are no large incisions and it does not require anesthesia.    Alternatives to the procedure:  A biopsy can be performed surgically.  Risks of a surgical biopsy include exposure to anesthesia, infection, excessive bleeding and injury to abdominal organs.  A benefit of surgical biopsy is the ability to see the area to be biopsied and remove of a larger piece of tissue.    THIS EDUCATION INFORMATION WAS REVIEWED PRIOR TO PROCEDURE AND CONSENT. Patient initials__________________Time___________________    Post Procedure:    *  Expect the biopsy site may be tender up to one week.    *  Rest today (no pushing pulling or straining).   *  Slowly increase activity  tomorrow.    *  If you received sedation do not drive for 24 hours.   *  Keep dressing clean and dry.   *  Leave dressing on puncture site for 24 hours.    *  You may shower when dressing removed.  Call your doctor if experiencing:   *  Signs of infection such as redness, swelling, excessive pain and / or foul        smelling drainage from the puncture site.   *  Chills or fever over 101 degrees (by mouth).   *  Unrelieved pain.   *  Any new or severe symptoms.   *  If experiencing sudden / severe shortness of breath or chest pain go to the       nearest emergency room.   Following the procedure:     Follow-up with the ordering physician as directed.    Continue to take other medications as directed by your physician unless    otherwise instructed.   If applicable, resume taking your blood thinners or Aspirin on __11/16/2019@0930am_______.    If you have any concerns please call the Radiology Nurses Desk at [phone].  You are the most important factor in your recovery.  Follow the above instructions carefully.

## 2019-11-16 ENCOUNTER — TELEPHONE (OUTPATIENT)
Dept: INTERVENTIONAL RADIOLOGY/VASCULAR | Facility: HOSPITAL | Age: 24
End: 2019-11-16

## 2019-11-18 ENCOUNTER — TELEPHONE (OUTPATIENT)
Dept: GASTROENTEROLOGY | Facility: CLINIC | Age: 24
End: 2019-11-18

## 2019-11-18 ENCOUNTER — OFFICE VISIT (OUTPATIENT)
Dept: OBSTETRICS AND GYNECOLOGY | Age: 24
End: 2019-11-18

## 2019-11-18 VITALS
BODY MASS INDEX: 38.93 KG/M2 | SYSTOLIC BLOOD PRESSURE: 102 MMHG | DIASTOLIC BLOOD PRESSURE: 68 MMHG | WEIGHT: 228 LBS | HEIGHT: 64 IN

## 2019-11-18 DIAGNOSIS — Z01.419 WELL WOMAN EXAM WITH ROUTINE GYNECOLOGICAL EXAM: Primary | ICD-10-CM

## 2019-11-18 PROCEDURE — 99395 PREV VISIT EST AGE 18-39: CPT | Performed by: NURSE PRACTITIONER

## 2019-11-18 RX ORDER — NORGESTIMATE-ETHINYL ESTRADIOL 7DAYSX3 LO
1 TABLET ORAL DAILY
Qty: 90 TABLET | Refills: 3 | Status: SHIPPED | OUTPATIENT
Start: 2019-11-18 | End: 2020-05-12

## 2019-11-18 NOTE — TELEPHONE ENCOUNTER
----- Message from Jayesh Holland sent at 11/18/2019  2:56 PM EST -----  Regarding: biopsy results  Contact: [phone]  Pt is calling for her biopsy results.

## 2019-11-18 NOTE — PROGRESS NOTES
[facility]  Routine Annual Visit    2019    Patient: Carina Galindo          MR#:[id number]      History of Present Illness    24 y.o. female  who presents for annual exam. Last pap negative 10/2018. Carina continues on OCPs with overall good cycle control and no problems. She has had some breakthrough bleeding the past month and denies skipping pills. No concern for STI- getting  next year. She denies cramping/pain. Reports symptoms started after pharmacy switched her generic OCP- requests ortho tri cyclen.    Carina sees Dr. Campuzano, gastroenterology for GERD. Liver nodule incidentally found followed by biopsy this past Friday. Carina states GI is aware she is on OCPs.    No GYN complaints or concerns  today.    Patient's last menstrual period was 2019 (approximate).  Obstetric History:  OB History      Para Term  AB Living    0 0 0 0 0 0    SAB TAB Ectopic Molar Multiple Live Births    0 0 0 0 0 0         Menstrual History:     Patient's last menstrual period was 2019 (approximate).       Sexual History:       ________________________________________  Patient Active Problem List   Diagnosis   • Palpitations   • Swelling   • Anxiety   • Asthma   • Weight gain, abnormal   • Screening for cervical cancer   • Encounter for surveillance of contraceptive pills   • Class 3 severe obesity due to excess calories without serious comorbidity in adult (CMS/HCC)   • Sprain and strain of right ankle       Past Medical History:   Diagnosis Date   • Abdominal pain    • Anxiety    • Asthma    • Diarrhea    • GERD (gastroesophageal reflux disease)    • Iron deficiency anemia    • Vomiting        Past Surgical History:   Procedure Laterality Date   • ADENOIDECTOMY     • CHOLECYSTECTOMY   or 2017   • CHOLECYSTECTOMY WITH INTRAOPERATIVE CHOLANGIOGRAM N/A 2016    Procedure: CHOLECYSTECTOMY LAPAROSCOPIC INTRAOPERATIVE CHOLANGIOGRAM;  Surgeon: Malathi Lozano MD;   Location: [facility] OR [facility];  Service:    • COLONOSCOPY  2010   • ENDOSCOPY  11/01/2013    Gastritis    • NASAL SEPTUM SURGERY     • SHOULDER SURGERY     • TONSILLECTOMY      x 2 due to regrowth   • UPPER GASTROINTESTINAL ENDOSCOPY  2010   • WISDOM TOOTH EXTRACTION         Social History     Tobacco Use   Smoking Status Never Smoker   Smokeless Tobacco Never Used       Family History   Problem Relation Age of Onset   • Gallbladder disease Mother 30   • Rheum arthritis Mother    • Hypertension Mother    • Endometriosis Mother    • Irritable bowel syndrome Mother    • Pancreatitis Mother    • Diabetes Father    • Breast cancer Maternal Grandmother    • Heart disease Maternal Grandfather        Prior to Admission medications    Medication Sig Start Date End Date Taking? Authorizing Provider   acetaminophen (TYLENOL) 325 MG tablet Take  by mouth Every 6 (Six) Hours As Needed.   Yes Provider, MD Junaid   albuterol (VENTOLIN HFA) 108 (90 Base) MCG/ACT inhaler Inhale 2 puffs Every 4 (Four) Hours As Needed for Wheezing. 8/3/18  Yes Kaya Hernandez APRN   citalopram (CeleXA) 40 MG tablet Take 60 mg by mouth Daily.   Yes Provider, MD Junaid   montelukast (SINGULAIR) 10 MG tablet Take 10 mg by mouth Daily. 1/15/18  Yes Provider, MD Junaid   pantoprazole (PROTONIX) 40 MG EC tablet Take 1 tablet by mouth Daily. 5/23/19  Yes Colleen Campuzano MD   traZODone (DESYREL) 50 MG tablet TAKE 1-2 TABLET BY MOUTH AT BEDTIME FOR INSOMNIA prn 6/12/17  Yes Provider, MD Junaid   TRI-LO-APRIL 0.18/0.215/0.25 MG-25 MCG per tablet TAKE ONE TABLET BY MOUTH DAILY 11/1/19  Yes Radha Alexis APRN     ________________________________________    The following portions of the patient's history were reviewed and updated as appropriate: allergies, current medications, past family history, past medical history, past social history, past surgical history and problem list.    Review of Systems   Constitutional: Negative.   "  HENT: Negative.    Eyes: Negative for visual disturbance.   Respiratory: Negative for cough, shortness of breath and wheezing.    Cardiovascular: Negative for chest pain, palpitations and leg swelling.   Gastrointestinal: Negative for abdominal distention, abdominal pain, blood in stool, constipation, diarrhea, nausea and vomiting.   Endocrine: Negative for cold intolerance and heat intolerance.   Genitourinary: Negative for difficulty urinating, dyspareunia, dysuria, frequency, genital sores, hematuria, menstrual problem, pelvic pain, urgency, vaginal bleeding, vaginal discharge and vaginal pain.   Musculoskeletal: Negative.    Skin: Negative.    Neurological: Negative for dizziness, weakness, light-headedness, numbness and headaches.   Hematological: Negative.    Psychiatric/Behavioral: Negative.    Breasts: negative for lumps skin changes, dimpling, swelling, nipple changes/discharge bilaterally         Objective   Physical Exam    /68   Ht 162.6 cm (64\")   Wt 103 kg (228 lb)   LMP 11/04/2019 (Approximate)   Breastfeeding? No   BMI 39.14 kg/m²    BP Readings from Last 3 Encounters:   11/18/19 102/68   11/15/19 99/61   07/19/19 128/74      Wt Readings from Last 3 Encounters:   11/18/19 103 kg (228 lb)   11/15/19 100 kg (221 lb)   07/19/19 101 kg (222 lb)        BMI: Estimated body mass index is 39.14 kg/m² as calculated from the following:    Height as of this encounter: 162.6 cm (64\").    Weight as of this encounter: 103 kg (228 lb).            General:   alert, appears stated age and cooperative   Heart: regular rate and rhythm, S1, S2 normal, no murmur, click, rub or gallop   Lungs: clear to auscultation bilaterally   Abdomen: soft, non-tender, without masses or organomegaly   Breast: inspection negative, no nipple discharge or bleeding, no masses or nodularity palpable   Vulva: normal   Vagina: normal mucosa, normal discharge   Cervix: no cervical motion tenderness and no lesions   Uterus: normal " size, mobile, non-tender or normal shape and consistency   Adnexa: exam limited by habitus     Assessment:    1. Normal annual exam   2. Contraception- doing well on OCPs and desires to continue. Risks, benefits, alternatives, and proper use reinforced. She will let me know if breakthrough bleeding persists beyond next pill pack.  3.  Counseled on healthy lifestyle modifications, safe sex, condom use, and self breast exams.      Plan:    []  Rx:   []  Mammogram request made  []  PAP done  []  Occult fecal blood test (Insure)  []  Labs:   []  GC/Chl/TV  []  DEXA scan   []  Referral for colonoscopy:           Albertina Kay, ZACH  11/18/2019 2:04 PM

## 2019-11-19 NOTE — TELEPHONE ENCOUNTER
Spoke with patient regarding liver path report-please send a request pathology to have it sent to the [facility] for second opinion.  Overall findings are benign but etiology is not well-defined based on the information presented with the pathology results

## 2019-11-19 NOTE — TELEPHONE ENCOUNTER
LIVER   Received: Today   Message Contents   [facility] Pool   Phone Number: [phone]             CALLING FOR PATH REPORT     Message to DR Campuzano.  Florinda De Jesus RN.

## 2019-11-20 ENCOUNTER — TELEPHONE (OUTPATIENT)
Dept: OBSTETRICS AND GYNECOLOGY | Age: 24
End: 2019-11-20

## 2019-11-20 LAB
C TRACH RRNA CVX QL NAA+PROBE: NEGATIVE
CONV .: NORMAL
CYTOLOGIST CVX/VAG CYTO: NORMAL
CYTOLOGY CVX/VAG DOC CYTO: NORMAL
CYTOLOGY CVX/VAG DOC THIN PREP: NORMAL
DX ICD CODE: NORMAL
HIV 1 & 2 AB SER-IMP: NORMAL
N GONORRHOEA RRNA CVX QL NAA+PROBE: NEGATIVE
OTHER STN SPEC: NORMAL
STAT OF ADQ CVX/VAG CYTO-IMP: NORMAL
T VAGINALIS RRNA SPEC QL NAA+PROBE: NEGATIVE

## 2019-11-20 NOTE — TELEPHONE ENCOUNTER
----- Message from ZACH Martinez sent at 11/20/2019  1:02 PM EST -----  Please inform patient her pap smear returned negative (normal). Thank you.

## 2020-01-07 ENCOUNTER — TELEPHONE (OUTPATIENT)
Dept: GASTROENTEROLOGY | Facility: CLINIC | Age: 25
End: 2020-01-07

## 2020-01-07 DIAGNOSIS — K76.89 LIVER NODULE: Primary | ICD-10-CM

## 2020-01-07 NOTE — TELEPHONE ENCOUNTER
See note of 11/18/19.      Call to [facility] and spoke with Britney.  Per instructions, request for second opinion faxed to [facility] @ [phone] re: accession  # [id number].  Confirmation received - see media tab.     Call to pt.  Advise of above.  Apology for delay - will call with results.  Verb understanding.    Update to Dr Campuzano.

## 2020-01-07 NOTE — TELEPHONE ENCOUNTER
----- Message from Martita Zamora sent at 1/7/2020  3:33 PM EST -----  Regarding: [facility] for second opinion   Contact: [phone]  Pt is calling regarding the [facility] for second opinion

## 2020-01-15 NOTE — TELEPHONE ENCOUNTER
JOSSELINE from Karin.  [facility] will be faxing results and [facility] will scan into system.  Will watch for results.

## 2020-01-15 NOTE — TELEPHONE ENCOUNTER
Call to [facility] and spoke with Karin.  She will check status with [place].

## 2020-01-16 LAB
CYTO UR: NORMAL
LAB AP CASE REPORT: NORMAL
LAB AP CLINICAL INFORMATION: NORMAL
LAB AP DIAGNOSIS COMMENT: NORMAL
PATH REPORT.ADDENDUM SPEC: NORMAL
PATH REPORT.FINAL DX SPEC: NORMAL
PATH REPORT.GROSS SPEC: NORMAL

## 2020-01-16 NOTE — TELEPHONE ENCOUNTER
See bx report with updated [facility] 2nd opoinion dated 1/16/20.  Message to Dr Campuzano.

## 2020-01-17 NOTE — TELEPHONE ENCOUNTER
[place] review shows benign tissue with mild inflammation- at this point recommend conservative management with repeat imaging in March - if lesion grows or changes, rec rebx. pls let me knowif agreeable and will order repeat imnaging

## 2020-01-17 NOTE — TELEPHONE ENCOUNTER
Call to pt.  Advise per Dr Campuzano that [place] review shows benign tissue with mild inflammation.  At this point, recommend conservative management with repeating imaging in March.  If lesion  Grows or changes, recommend rebx.      Pt verb understanding - agreeable to repeat imaging.  Message to Dr Campuzano

## 2020-03-20 ENCOUNTER — TELEPHONE (OUTPATIENT)
Dept: GASTROENTEROLOGY | Facility: CLINIC | Age: 25
End: 2020-03-20

## 2020-03-20 NOTE — TELEPHONE ENCOUNTER
----- Message from Martita Zamora sent at 3/20/2020  2:39 PM EDT -----  Regarding: question Imaging- Hospital  Contact: [phone]  Imaging MRI is calling asking if Dr Campuzano thinks this MRI is necessary, they asked if no answer if can leave a detailed message if is ok to rescheudle or if it has to be done.

## 2020-03-23 NOTE — TELEPHONE ENCOUNTER
Called pt and advised per Dr Salcedo' note. Pt verb understanding and will call [phone] to reschedule. Advised pt to let us know if she needs a new order. Pt verb understanding.

## 2020-03-23 NOTE — TELEPHONE ENCOUNTER
I think it is okay to reschedule this in 2 to 3 months.  Please let me know if I need to put in a new order and please inform patient.

## 2020-03-27 ENCOUNTER — HOSPITAL ENCOUNTER (OUTPATIENT)
Dept: MRI IMAGING | Facility: HOSPITAL | Age: 25
End: 2020-03-27

## 2020-05-12 ENCOUNTER — TELEPHONE (OUTPATIENT)
Dept: OBSTETRICS AND GYNECOLOGY | Age: 25
End: 2020-05-12

## 2020-05-12 RX ORDER — NORGESTIMATE-ETHINYL ESTRADIOL 7DAYSX3 LO
1 TABLET ORAL DAILY
Qty: 90 TABLET | Refills: 0 | Status: SHIPPED | OUTPATIENT
Start: 2020-05-12 | End: 2020-08-17

## 2020-05-12 NOTE — TELEPHONE ENCOUNTER
(Albertina pt) Pt had her bcp called in and the pharmacy doesn't have the Name brand of it and is not able to order it currently. Pt states they can use the generic but we have to okay it. Is this okay?     [phone]

## 2020-05-22 ENCOUNTER — HOSPITAL ENCOUNTER (OUTPATIENT)
Dept: MRI IMAGING | Facility: HOSPITAL | Age: 25
Discharge: HOME | End: 2020-05-22
Admitting: INTERNAL MEDICINE

## 2020-05-22 DIAGNOSIS — K76.89 LIVER NODULE: ICD-10-CM

## 2020-05-22 PROCEDURE — A9577 INJ MULTIHANCE: HCPCS | Performed by: INTERNAL MEDICINE

## 2020-05-22 PROCEDURE — 0 GADOBENATE DIMEGLUMINE 529 MG/ML SOLUTION: Performed by: INTERNAL MEDICINE

## 2020-05-22 PROCEDURE — 74183 MRI ABD W/O CNTR FLWD CNTR: CPT

## 2020-05-22 RX ADMIN — GADOBENATE DIMEGLUMINE 19 ML: 529 INJECTION, SOLUTION INTRAVENOUS at 08:10

## 2020-05-26 ENCOUNTER — TELEPHONE (OUTPATIENT)
Dept: GASTROENTEROLOGY | Facility: CLINIC | Age: 25
End: 2020-05-26

## 2020-05-26 DIAGNOSIS — K76.89 LIVER NODULE: Primary | ICD-10-CM

## 2020-05-26 NOTE — TELEPHONE ENCOUNTER
Please let her know that the liver lesion is unchanged from prior imaging.  Again the imaging appears atypical for a benign hemangioma but is still possible.  It also could be focal nodular hyperplasia which is also benign.  She also has some mildly enlarged lymph nodes near the liver which were seen on prior imaging and are unchanged.  Recommend repeat imaging to assess for stability in 6 months    Due to development of hives after this recent MRI, she should be premedicated with steroids and Benadryl prior to the next MRI.  Please have her contact me once this is scheduled for a prescription

## 2020-05-27 NOTE — TELEPHONE ENCOUNTER
Called pt and advised of Dr Campuzano's note. Pt verb understanding and states she will call us back once she has it scheduled.

## 2020-08-17 RX ORDER — NORGESTIMATE AND ETHINYL ESTRADIOL
KIT
Qty: 84 TABLET | Refills: 0 | Status: SHIPPED | OUTPATIENT
Start: 2020-08-17 | End: 2020-11-19

## 2020-11-19 RX ORDER — NORGESTIMATE AND ETHINYL ESTRADIOL
KIT
Qty: 84 TABLET | Refills: 0 | Status: SHIPPED | OUTPATIENT
Start: 2020-11-19 | End: 2021-02-08

## 2020-11-20 ENCOUNTER — OFFICE VISIT (OUTPATIENT)
Dept: OBSTETRICS AND GYNECOLOGY | Age: 25
End: 2020-11-20

## 2020-11-20 VITALS
BODY MASS INDEX: 32.65 KG/M2 | SYSTOLIC BLOOD PRESSURE: 100 MMHG | DIASTOLIC BLOOD PRESSURE: 64 MMHG | HEIGHT: 65 IN | WEIGHT: 196 LBS

## 2020-11-20 DIAGNOSIS — Z01.419 WELL WOMAN EXAM WITH ROUTINE GYNECOLOGICAL EXAM: Primary | ICD-10-CM

## 2020-11-20 PROCEDURE — 99395 PREV VISIT EST AGE 18-39: CPT | Performed by: NURSE PRACTITIONER

## 2020-11-20 RX ORDER — ACETAMINOPHEN 325 MG/1
TABLET ORAL
COMMUNITY
End: 2021-03-15 | Stop reason: SDUPTHER

## 2020-11-20 NOTE — PROGRESS NOTES
[facility]  Routine Annual Visit    2020    Patient: Geo Galindo          MR#:[id number]      History of Present Illness    25 y.o. female  who presents for annual exam.    Geo continues on OCPs for contraception with good cycle control and no problems  She is still being followed for benign liver nodule by Dr. Campuzano. I've asked her again to confirm with Dr. Campuzano she is ok with Geo being on oral contraceptives. Counseled regarding contraindications with OCPs with liver disorders. We discussed alternate forms of birth control- geo states she will consider IUD. Brochure on kyleena given. Will call Dr. Campuzano while considering.   Denies new medical hx.  States overall doing well.   No complaints today.  Recently got !    Patient's last menstrual period was 2020.  Obstetric History:  OB History        0    Para   0    Term   0       0    AB   0    Living   0       SAB   0    TAB   0    Ectopic   0    Molar   0    Multiple   0    Live Births   0               Menstrual History:     Patient's last menstrual period was 2020.       Sexual History:       ________________________________________  Patient Active Problem List   Diagnosis   • Palpitations   • Swelling   • Anxiety   • Asthma   • Weight gain, abnormal   • Screening for cervical cancer   • Encounter for surveillance of contraceptive pills   • Class 3 severe obesity due to excess calories without serious comorbidity in adult (CMS/HCC)   • Sprain and strain of right ankle       Past Medical History:   Diagnosis Date   • Abdominal pain    • Anxiety    • Asthma    • Diarrhea    • GERD (gastroesophageal reflux disease)    • Iron deficiency anemia    • Vomiting        Past Surgical History:   Procedure Laterality Date   • ADENOIDECTOMY     • CHOLECYSTECTOMY   or 2017   • CHOLECYSTECTOMY WITH INTRAOPERATIVE CHOLANGIOGRAM N/A 2016    Procedure: CHOLECYSTECTOMY LAPAROSCOPIC  INTRAOPERATIVE CHOLANGIOGRAM;  Surgeon: Malathi Lozano MD;  Location: [facility] OR [facility];  Service:    • COLONOSCOPY  2010   • ENDOSCOPY  11/01/2013    Gastritis    • NASAL SEPTUM SURGERY     • SHOULDER SURGERY     • TONSILLECTOMY      x 2 due to regrowth   • UPPER GASTROINTESTINAL ENDOSCOPY  2010   • WISDOM TOOTH EXTRACTION         Social History     Tobacco Use   Smoking Status Never Smoker   Smokeless Tobacco Never Used       Family History   Problem Relation Age of Onset   • Gallbladder disease Mother 30   • Rheum arthritis Mother    • Hypertension Mother    • Endometriosis Mother    • Irritable bowel syndrome Mother    • Pancreatitis Mother    • Diabetes Father    • Breast cancer Maternal Grandmother    • Heart disease Maternal Grandfather        Prior to Admission medications    Medication Sig Start Date End Date Taking? Authorizing Provider   acetaminophen (TYLENOL) 325 MG tablet Take  by mouth.   Yes ProviderJunaid MD   albuterol (VENTOLIN HFA) 108 (90 Base) MCG/ACT inhaler Inhale 2 puffs Every 4 (Four) Hours As Needed for Wheezing. 8/3/18  Yes Kaya Hernandez APRN   citalopram (CeleXA) 40 MG tablet Take 60 mg by mouth Daily.   Yes ProviderJunaid MD   montelukast (SINGULAIR) 10 MG tablet Take 10 mg by mouth Daily. 1/15/18  Yes ProviderJunaid MD   traZODone (DESYREL) 50 MG tablet TAKE 1-2 TABLET BY MOUTH AT BEDTIME FOR INSOMNIA prn 6/12/17  Yes ProviderJunaid MD   Tri-Lo-Sprintec 0.18/0.215/0.25 MG-25 MCG per tablet TAKE ONE TABLET BY MOUTH DAILY 11/19/20  Yes Albertina Kay APRN   acetaminophen (TYLENOL) 325 MG tablet Take  by mouth Every 6 (Six) Hours As Needed.    ProviderJunaid MD   pantoprazole (PROTONIX) 40 MG EC tablet Take 1 tablet by mouth Daily. 5/23/19   Colleen Campuzano MD     ________________________________________  The following portions of the patient's history were reviewed and updated as appropriate: allergies, current medications, past family history,  "past medical history, past social history, past surgical history and problem list.    Review of Systems   Constitutional: Negative.    HENT: Negative.    Eyes: Negative for visual disturbance.   Respiratory: Negative for cough, shortness of breath and wheezing.    Cardiovascular: Negative for chest pain, palpitations and leg swelling.   Gastrointestinal: Negative for abdominal distention, abdominal pain, blood in stool, constipation, diarrhea, nausea and vomiting.   Endocrine: Negative for cold intolerance and heat intolerance.   Genitourinary: Negative for difficulty urinating, dyspareunia, dysuria, frequency, genital sores, hematuria, menstrual problem, pelvic pain, urgency, vaginal bleeding, vaginal discharge and vaginal pain.   Musculoskeletal: Negative.    Skin: Negative.    Neurological: Negative for dizziness, weakness, light-headedness, numbness and headaches.   Hematological: Negative.    Psychiatric/Behavioral: Negative.    Breasts: negative for lumps skin changes, dimpling, swelling, nipple changes/discharge bilaterally       Objective   Physical Exam    /64   Ht 165.1 cm (65\")   Wt 88.9 kg (196 lb)   LMP 11/14/2020   Breastfeeding No   BMI 32.62 kg/m²    BP Readings from Last 3 Encounters:   11/20/20 100/64   11/18/19 102/68   11/15/19 99/61      Wt Readings from Last 3 Encounters:   11/20/20 88.9 kg (196 lb)   05/22/20 97.5 kg (215 lb)   11/18/19 103 kg (228 lb)        BMI: Estimated body mass index is 32.62 kg/m² as calculated from the following:    Height as of this encounter: 165.1 cm (65\").    Weight as of this encounter: 88.9 kg (196 lb).            General:   alert, appears stated age and cooperative   Heart: regular rate and rhythm, S1, S2 normal, no murmur, click, rub or gallop   Lungs: clear to auscultation bilaterally   Abdomen: soft, non-tender, without masses or organomegaly   Breast: inspection negative, no nipple discharge or bleeding, no masses or nodularity palpable   Vulva: " External genitalia including bartholin's glands, Urethra, Salineno's gland and urethra meatus are normal, Perineum, rectum and anus appear normal  and Bladder appears normal without significant prolapse    Vagina: normal mucosa, normal discharge   Cervix: no cervical motion tenderness and no lesions   Uterus: normal size, mobile, non-tender or normal shape and consistency   Adnexa: no mass, fullness, tenderness     Assessment:    1. Normal annual exam   2.  Counseled on healthy lifestyle modifications, safe sex, condom use, and self breast exams.      Plan:    []  Rx:   []  Mammogram request made  []  PAP done  []  Occult fecal blood test (Insure)  []  Labs:   []  GC/Chl/TV  []  DEXA scan   []  Referral for colonoscopy:           ZACH Peterson  11/20/2020 10:36 EST

## 2020-11-24 LAB
CONV .: NORMAL
CYTOLOGIST CVX/VAG CYTO: NORMAL
CYTOLOGY CVX/VAG DOC CYTO: NORMAL
CYTOLOGY CVX/VAG DOC THIN PREP: NORMAL
DX ICD CODE: NORMAL
HIV 1 & 2 AB SER-IMP: NORMAL
OTHER STN SPEC: NORMAL
STAT OF ADQ CVX/VAG CYTO-IMP: NORMAL

## 2020-11-30 ENCOUNTER — TELEPHONE (OUTPATIENT)
Dept: OBSTETRICS AND GYNECOLOGY | Age: 25
End: 2020-11-30

## 2020-11-30 NOTE — TELEPHONE ENCOUNTER
----- Message from ZACH Martinez sent at 11/30/2020  8:41 AM EST -----  Please inform patient her pap smear returned negative (normal). Thank you.

## 2021-02-08 RX ORDER — NORGESTIMATE AND ETHINYL ESTRADIOL
KIT
Qty: 84 TABLET | Refills: 0 | Status: SHIPPED | OUTPATIENT
Start: 2021-02-08 | End: 2021-05-03 | Stop reason: SDUPTHER

## 2021-02-09 RX ORDER — NORGESTIMATE AND ETHINYL ESTRADIOL 7DAYSX3 LO
1 KIT ORAL DAILY
Qty: 84 TABLET | Refills: 0 | Status: CANCELLED | OUTPATIENT
Start: 2021-02-09

## 2021-04-30 ENCOUNTER — TELEPHONE (OUTPATIENT)
Dept: OBSTETRICS AND GYNECOLOGY | Age: 26
End: 2021-04-30

## 2021-05-03 RX ORDER — NORGESTIMATE AND ETHINYL ESTRADIOL 7DAYSX3 LO
1 KIT ORAL DAILY
Qty: 84 TABLET | Refills: 0 | Status: SHIPPED | OUTPATIENT
Start: 2021-05-03 | End: 2021-06-21

## 2021-05-03 NOTE — TELEPHONE ENCOUNTER
Called patient and informed her prescription has been sent to [facility].

## 2021-06-21 RX ORDER — NORGESTIMATE AND ETHINYL ESTRADIOL
KIT
Qty: 84 TABLET | Refills: 0 | Status: SHIPPED | OUTPATIENT
Start: 2021-06-21 | End: 2021-09-22 | Stop reason: SDUPTHER

## 2021-09-21 ENCOUNTER — TELEPHONE (OUTPATIENT)
Dept: OBSTETRICS AND GYNECOLOGY | Age: 26
End: 2021-09-21

## 2021-09-22 RX ORDER — NORGESTIMATE AND ETHINYL ESTRADIOL 7DAYSX3 LO
1 KIT ORAL DAILY
Qty: 84 TABLET | Refills: 3 | Status: SHIPPED | OUTPATIENT
Start: 2021-09-22 | End: 2022-08-02 | Stop reason: SDUPTHER

## 2021-11-22 ENCOUNTER — OFFICE VISIT (OUTPATIENT)
Dept: OBSTETRICS AND GYNECOLOGY | Age: 26
End: 2021-11-22

## 2021-11-22 VITALS
DIASTOLIC BLOOD PRESSURE: 64 MMHG | WEIGHT: 207.6 LBS | SYSTOLIC BLOOD PRESSURE: 108 MMHG | BODY MASS INDEX: 34.59 KG/M2 | HEIGHT: 65 IN

## 2021-11-22 DIAGNOSIS — Z01.419 WELL WOMAN EXAM WITH ROUTINE GYNECOLOGICAL EXAM: Primary | ICD-10-CM

## 2021-11-22 PROCEDURE — 99395 PREV VISIT EST AGE 18-39: CPT | Performed by: NURSE PRACTITIONER

## 2021-11-22 RX ORDER — CITALOPRAM 20 MG/1
20 TABLET ORAL
COMMUNITY
Start: 2021-09-16 | End: 2023-03-14

## 2021-11-22 NOTE — PROGRESS NOTES
[facility], [place], operated by [facility]  Routine Annual Visit    2021    Patient: Carina Galindo          MR#:[id number]      History of Present Illness    26 y.o. female  who presents for annual exam.    Was evaluated at urgent care for lower abdominal pain last week. - was treated for UTI and BV. Symptoms improved but still noticing some intermittent lower abdominal sharp pains that come and go. - no concern for STI.     Denies new medical hx.    Patient's last menstrual period was 2021 (approximate).  Obstetric History:  OB History        0    Para   0    Term   0       0    AB   0    Living   0       SAB   0    IAB   0    Ectopic   0    Molar   0    Multiple   0    Live Births   0               Menstrual History:     Patient's last menstrual period was 2021 (approximate).       Sexual History:       ________________________________________  Patient Active Problem List   Diagnosis   • Palpitations   • Swelling   • Anxiety   • Asthma   • Weight gain, abnormal   • Screening for cervical cancer   • Encounter for surveillance of contraceptive pills   • Class 3 severe obesity due to excess calories without serious comorbidity in adult (HCC)   • Sprain and strain of right ankle       Past Medical History:   Diagnosis Date   • Abdominal pain    • Anxiety    • Asthma    • Diarrhea    • GERD (gastroesophageal reflux disease)    • Iron deficiency anemia    • Vomiting        Past Surgical History:   Procedure Laterality Date   • ADENOIDECTOMY     • CHOLECYSTECTOMY   or 2017   • CHOLECYSTECTOMY WITH INTRAOPERATIVE CHOLANGIOGRAM N/A 2016    Procedure: CHOLECYSTECTOMY LAPAROSCOPIC INTRAOPERATIVE CHOLANGIOGRAM;  Surgeon: Malathi Lozano MD;  Location: [facility] OR [facility];  Service:    • COLONOSCOPY     • ENDOSCOPY  2013    Gastritis    • NASAL SEPTUM SURGERY     • SHOULDER SURGERY     • TONSILLECTOMY      x 2 due to regrowth   • UPPER GASTROINTESTINAL ENDOSCOPY     • WISDOM  TOOTH EXTRACTION         Social History     Tobacco Use   Smoking Status Never Smoker   Smokeless Tobacco Never Used       Family History   Problem Relation Age of Onset   • Gallbladder disease Mother 30   • Rheum arthritis Mother    • Hypertension Mother    • Endometriosis Mother    • Irritable bowel syndrome Mother    • Pancreatitis Mother    • Diabetes Father    • Breast cancer Maternal Grandmother    • Heart disease Maternal Grandfather        Prior to Admission medications    Medication Sig Start Date End Date Taking? Authorizing Provider   acetaminophen (TYLENOL) 325 MG tablet Take  by mouth Every 6 (Six) Hours As Needed.   Yes Junaid Bright MD   albuterol (VENTOLIN HFA) 108 (90 Base) MCG/ACT inhaler Inhale 2 puffs Every 4 (Four) Hours As Needed for Wheezing. 8/3/18  Yes Kaya Hernandez APRN   citalopram (CeleXA) 20 MG tablet Take 20 mg by mouth every night at bedtime. 9/16/21  Yes Junaid Bright MD   norgestimate-ethinyl estradiol (Tri-Lo-Sprintec) 0.18/0.215/0.25 MG-25 MCG per tablet Take 1 tablet by mouth Daily. 9/22/21  Yes Pauline Clements MD   citalopram (CeleXA) 40 MG tablet Take 60 mg by mouth Daily.    Junaid Bright MD   montelukast (SINGULAIR) 10 MG tablet Take 10 mg by mouth Daily. 1/15/18   Junaid Bright MD   traZODone (DESYREL) 50 MG tablet TAKE 1-2 TABLET BY MOUTH AT BEDTIME FOR INSOMNIA prn 6/12/17   Junaid Bright MD     ________________________________________    The following portions of the patient's history were reviewed and updated as appropriate: allergies, current medications, past family history, past medical history, past social history, past surgical history and problem list.    Review of Systems   Constitutional: Negative.    HENT: Negative.    Eyes: Negative for visual disturbance.   Respiratory: Negative for cough, shortness of breath and wheezing.    Cardiovascular: Negative for chest pain, palpitations and leg swelling.  "  Gastrointestinal: Negative for abdominal distention, abdominal pain, blood in stool, constipation, diarrhea, nausea and vomiting.   Endocrine: Negative for cold intolerance and heat intolerance.   Genitourinary: Positive for pelvic pain. Negative for difficulty urinating, dyspareunia, dysuria, frequency, genital sores, hematuria, menstrual problem, urgency, vaginal bleeding, vaginal discharge and vaginal pain.   Musculoskeletal: Negative.    Skin: Negative.    Neurological: Negative for dizziness, weakness, light-headedness, numbness and headaches.   Hematological: Negative.    Psychiatric/Behavioral: Negative.    Breasts: negative for lumps skin changes, dimpling, swelling, nipple changes/discharge bilaterally       Objective   Physical Exam    Ht 165.1 cm (65\")   Wt 94.2 kg (207 lb 9.6 oz)   LMP 11/17/2021 (Approximate)   Breastfeeding No   BMI 34.55 kg/m²    BP Readings from Last 3 Encounters:   03/15/21 126/89   12/31/20 103/68   11/20/20 100/64      Wt Readings from Last 3 Encounters:   11/22/21 94.2 kg (207 lb 9.6 oz)   03/15/21 84.8 kg (187 lb)   12/31/20 86.2 kg (190 lb)        BMI: Estimated body mass index is 34.55 kg/m² as calculated from the following:    Height as of this encounter: 165.1 cm (65\").    Weight as of this encounter: 94.2 kg (207 lb 9.6 oz).            General:   alert, appears stated age and cooperative   Heart: regular rate and rhythm, S1, S2 normal, no murmur, click, rub or gallop   Lungs: clear to auscultation bilaterally   Abdomen: soft, non-tender, without masses or organomegaly   Breast: inspection negative, no nipple discharge or bleeding, no masses or nodularity palpable   Vulva: External genitalia including bartholin's glands, Urethra, Suarez's gland and urethra meatus are normal, Perineum, rectum and anus appear normal  and Bladder appears normal without significant prolapse    Vagina: normal mucosa, normal discharge   Cervix: no cervical motion tenderness and no lesions "   Uterus: normal size, mobile, non-tender and normal shape and consistency   Adnexa: no mass, fullness, tenderness     Assessment:    Diagnoses and all orders for this visit:    1. Well woman exam with routine gynecological exam (Primary)  -     IGP,CtNgTv,rfx Aptima HPV ASCU        Return for T/V US for further eval pelvic pain. Advise follow up with GI as well. She understands and agrees.    Counseled on healthy lifestyle modifications, safe sex, condom use, and self breast exams.      Albertina Kay, ZACH  11/22/2021 15:00 EST

## 2021-11-30 LAB
C TRACH RRNA CVX QL NAA+PROBE: NEGATIVE
CONV .: NORMAL
CYTOLOGIST CVX/VAG CYTO: NORMAL
CYTOLOGY CVX/VAG DOC CYTO: NORMAL
CYTOLOGY CVX/VAG DOC THIN PREP: NORMAL
DX ICD CODE: NORMAL
HIV 1 & 2 AB SER-IMP: NORMAL
N GONORRHOEA RRNA CVX QL NAA+PROBE: NEGATIVE
OTHER STN SPEC: NORMAL
PATHOLOGIST CVX/VAG CYTO: NORMAL
STAT OF ADQ CVX/VAG CYTO-IMP: NORMAL
T VAGINALIS RRNA SPEC QL NAA+PROBE: NEGATIVE

## 2022-04-04 NOTE — PROGRESS NOTES
"Socorro Galindo is a 22 y.o. female who presents c/o possible insect bite on right thigh.     History of Present Illness   Lesion present for 2 days, now smaller, round, painful, was draining clear exudate. With red borders.   The following portions of the patient's history were reviewed and updated as appropriate: allergies, current medications, past family history, past medical history, past social history, past surgical history and problem list.    Review of Systems   Constitutional: Negative for chills and fever.   Gastrointestinal: Positive for vomiting (x1).   Skin: Positive for color change and wound.   Hematological: Negative.    Psychiatric/Behavioral: Negative.      /74  Pulse 102  Temp 99.7 °F (37.6 °C) (Oral)   Ht 165.1 cm (65\")  Wt 109 kg (240 lb)  LMP 02/07/2018 (Approximate)  SpO2 98%  BMI 39.94 kg/m2    Objective   Physical Exam   Constitutional: She appears well-developed and well-nourished. No distress.   Skin:   R groin with 3mm red flat lesion without induration, no fluctuance, clear scant exudate. L groin with pink denuded superficial excoriation.   Psychiatric: She has a normal mood and affect. Her behavior is normal. Judgment and thought content normal.   Nursing note and vitals reviewed.    Assessment/Plan   Problems Addressed this Visit     None      Visit Diagnoses     Cutaneous abscess of groin    -  Primary    Relevant Medications    doxycycline (VIBRAMYCIN) 100 MG capsule        Abscess--nothing to drain, rx doxycycline  Denuded skin--apply triple antibiotic ointment daily until heals.   FU prn         " Prescription sent to preferred pharmacy per protocol.

## 2022-08-02 ENCOUNTER — TELEPHONE (OUTPATIENT)
Dept: OBSTETRICS AND GYNECOLOGY | Age: 27
End: 2022-08-02

## 2022-08-02 RX ORDER — NORGESTIMATE AND ETHINYL ESTRADIOL 7DAYSX3 LO
1 KIT ORAL DAILY
Qty: 84 TABLET | Refills: 3 | Status: SHIPPED | OUTPATIENT
Start: 2022-08-02 | End: 2023-03-14

## 2022-08-02 RX ORDER — NORGESTIMATE AND ETHINYL ESTRADIOL 7DAYSX3 LO
1 KIT ORAL DAILY
Qty: 84 TABLET | Refills: 3 | Status: SHIPPED | OUTPATIENT
Start: 2022-08-02 | End: 2022-08-02 | Stop reason: SDUPTHER

## 2022-12-01 ENCOUNTER — OFFICE VISIT (OUTPATIENT)
Dept: OBSTETRICS AND GYNECOLOGY | Age: 27
End: 2022-12-01

## 2022-12-01 VITALS
HEIGHT: 65 IN | WEIGHT: 228.6 LBS | SYSTOLIC BLOOD PRESSURE: 122 MMHG | BODY MASS INDEX: 38.09 KG/M2 | DIASTOLIC BLOOD PRESSURE: 74 MMHG

## 2022-12-01 DIAGNOSIS — Z01.419 WELL WOMAN EXAM WITH ROUTINE GYNECOLOGICAL EXAM: Primary | ICD-10-CM

## 2022-12-01 PROCEDURE — 99395 PREV VISIT EST AGE 18-39: CPT | Performed by: NURSE PRACTITIONER

## 2022-12-01 RX ORDER — EPINEPHRINE 0.3 MG/.3ML
INJECTION SUBCUTANEOUS
COMMUNITY

## 2022-12-01 NOTE — PROGRESS NOTES
[facility], [place], operated by [facility]  Routine Annual Visit    2022    Patient: Carina Galindo          MR#:[id number]      History of Present Illness    27 y.o. female  who presents for annual exam without complaint and no new medical history.    Patient's last menstrual period was 2022 (exact date).  Obstetric History:  OB History        0    Para   0    Term   0       0    AB   0    Living   0       SAB   0    IAB   0    Ectopic   0    Molar   0    Multiple   0    Live Births   0               Menstrual History:     Patient's last menstrual period was 2022 (exact date).       Sexual History:       ________________________________________  Patient Active Problem List   Diagnosis   • Anxiety   • Asthma   • Weight gain, abnormal   • Screening for cervical cancer   • Encounter for surveillance of contraceptive pills   • Class 3 severe obesity due to excess calories without serious comorbidity in adult (HCC)   • Sprain and strain of right ankle       Past Medical History:   Diagnosis Date   • Abdominal pain    • Anxiety    • Asthma    • Diarrhea    • GERD (gastroesophageal reflux disease)    • Iron deficiency anemia    • Vomiting        Past Surgical History:   Procedure Laterality Date   • ADENOIDECTOMY     • CHOLECYSTECTOMY  2016 or 2017   • CHOLECYSTECTOMY WITH INTRAOPERATIVE CHOLANGIOGRAM N/A 2016    Procedure: CHOLECYSTECTOMY LAPAROSCOPIC INTRAOPERATIVE CHOLANGIOGRAM;  Surgeon: Malathi Lozano MD;  Location: [facility];  Service:    • COLONOSCOPY     • ENDOSCOPY  2013    Gastritis    • NASAL SEPTUM SURGERY     • SHOULDER SURGERY     • TONSILLECTOMY      x 2 due to regrowth   • UPPER GASTROINTESTINAL ENDOSCOPY     • WISDOM TOOTH EXTRACTION         Social History     Tobacco Use   Smoking Status Never   Smokeless Tobacco Never       Family History   Problem Relation Age of Onset   • Gallbladder disease Mother 30   • Rheum arthritis Mother    • Hypertension Mother     • Endometriosis Mother    • Irritable bowel syndrome Mother    • Pancreatitis Mother    • Diabetes Father    • Breast cancer Maternal Grandmother    • Heart disease Maternal Grandfather        Prior to Admission medications    Medication Sig Start Date End Date Taking? Authorizing Provider   acetaminophen (TYLENOL) 325 MG tablet Take  by mouth Every 6 (Six) Hours As Needed.   Yes Junaid Bright MD   albuterol (VENTOLIN HFA) 108 (90 Base) MCG/ACT inhaler Inhale 2 puffs Every 4 (Four) Hours As Needed for Wheezing. 8/3/18  Yes Kaya Hernandez APRN   citalopram (CeleXA) 20 MG tablet Take 20 mg by mouth every night at bedtime. 9/16/21  Yes Junaid Bright MD   EPINEPHrine (EPIPEN) 0.3 MG/0.3ML solution auto-injector injection    Yes Junaid Bright MD   citalopram (CeleXA) 40 MG tablet Take 60 mg by mouth Daily.    Junaid Bright MD   montelukast (SINGULAIR) 10 MG tablet Take 10 mg by mouth Daily. 1/15/18   Junaid Bright MD   norgestimate-ethinyl estradiol (Tri-Lo-Sprintec) 0.18/0.215/0.25 MG-25 MCG per tablet Take 1 tablet by mouth Daily. 8/2/22   Pauline Clements MD   traZODone (DESYREL) 50 MG tablet TAKE 1-2 TABLET BY MOUTH AT BEDTIME FOR INSOMNIA prn 6/12/17   Junaid Bright MD     ________________________________________  The following portions of the patient's history were reviewed and updated as appropriate: allergies, current medications, past family history, past medical history, past social history, past surgical history and problem list.    Review of Systems   Constitutional: Negative.    HENT: Negative.    Eyes: Negative for visual disturbance.   Respiratory: Negative for cough, shortness of breath and wheezing.    Cardiovascular: Negative for chest pain, palpitations and leg swelling.   Gastrointestinal: Negative for abdominal distention, abdominal pain, blood in stool, constipation, diarrhea, nausea and vomiting.   Endocrine: Negative for cold intolerance  "and heat intolerance.   Genitourinary: Negative for difficulty urinating, dyspareunia, dysuria, frequency, genital sores, hematuria, menstrual problem, pelvic pain, urgency, vaginal bleeding, vaginal discharge and vaginal pain.   Musculoskeletal: Negative.    Skin: Negative.    Neurological: Negative for dizziness, weakness, light-headedness, numbness and headaches.   Hematological: Negative.    Psychiatric/Behavioral: Negative.    Breasts: negative for lumps skin changes, dimpling, swelling, nipple changes/discharge bilaterally       Objective   Physical Exam    /74   Ht 165.1 cm (65\")   LMP 11/23/2022 (Exact Date)   BMI 34.55 kg/m²    BP Readings from Last 3 Encounters:   12/01/22 122/74   11/22/21 108/64   03/15/21 126/89      Wt Readings from Last 3 Encounters:   11/22/21 94.2 kg (207 lb 9.6 oz)   03/15/21 84.8 kg (187 lb)   12/31/20 86.2 kg (190 lb)        BMI: Estimated body mass index is 34.55 kg/m² as calculated from the following:    Height as of this encounter: 165.1 cm (65\").    Weight as of 11/22/21: 94.2 kg (207 lb 9.6 oz).        General:   alert, appears stated age and cooperative   Heart: regular rate and rhythm, S1, S2 normal, no murmur, click, rub or gallop   Lungs: clear to auscultation bilaterally   Abdomen: soft, non-tender, without masses or organomegaly   Breast: inspection negative, no nipple discharge or bleeding, no masses or nodularity palpable   Vulva: External genitalia including bartholin's glands, Urethra, Veblen's gland and urethra meatus are normal, Perineum, rectum and anus appear normal  and Bladder appears normal without significant prolapse    Vagina: normal mucosa, normal discharge   Cervix: no lesions   Uterus: normal size, mobile and non-tender   Adnexa: normal adnexa     Assessment:    Diagnoses and all orders for this visit:    1. Well woman exam with routine gynecological exam (Primary)  -     IGP, Rfx Aptima HPV ASCU    Counseled on healthy lifestyle modifications, " safe sex, condom use, and self breast exams.    All of the patient's questions were addressed and answered, I have encouraged her to call for today's test results if she has not received them within 10 days.  Patient is advised to call with any change in her condition or with any other questions, otherwise return in 12 months for annual examination.        Albertina Kay, ZACH  12/1/2022 13:50 EST

## 2023-02-06 ENCOUNTER — OFFICE VISIT (OUTPATIENT)
Dept: ORTHOPEDIC SURGERY | Facility: CLINIC | Age: 28
End: 2023-02-06
Payer: OTHER MISCELLANEOUS

## 2023-02-06 VITALS — TEMPERATURE: 97.6 F | HEIGHT: 64 IN | BODY MASS INDEX: 36.89 KG/M2 | WEIGHT: 216.1 LBS

## 2023-02-06 DIAGNOSIS — M25.562 LEFT KNEE PAIN, UNSPECIFIED CHRONICITY: Primary | ICD-10-CM

## 2023-02-06 PROCEDURE — 99204 OFFICE O/P NEW MOD 45 MIN: CPT | Performed by: ORTHOPAEDIC SURGERY

## 2023-02-06 PROCEDURE — 73562 X-RAY EXAM OF KNEE 3: CPT | Performed by: ORTHOPAEDIC SURGERY

## 2023-02-06 RX ORDER — CEFAZOLIN SODIUM 2 G/100ML
2 INJECTION, SOLUTION INTRAVENOUS ONCE
Status: CANCELLED | OUTPATIENT
Start: 2023-03-28 | End: 2023-02-06

## 2023-02-08 ENCOUNTER — PATIENT ROUNDING (BHMG ONLY) (OUTPATIENT)
Dept: ORTHOPEDIC SURGERY | Facility: CLINIC | Age: 28
End: 2023-02-08
Payer: OTHER MISCELLANEOUS

## 2023-02-08 NOTE — PROGRESS NOTES
A Ask Ziggy Message has been sent to the patient for PATIENT ROUNDING with [facility]

## 2023-02-20 PROBLEM — M25.562 LEFT KNEE PAIN: Status: ACTIVE | Noted: 2023-02-20

## 2023-03-14 ENCOUNTER — PRE-ADMISSION TESTING (OUTPATIENT)
Dept: PREADMISSION TESTING | Facility: HOSPITAL | Age: 28
End: 2023-03-14
Payer: OTHER MISCELLANEOUS

## 2023-03-14 VITALS
WEIGHT: 211.9 LBS | RESPIRATION RATE: 16 BRPM | TEMPERATURE: 97.8 F | SYSTOLIC BLOOD PRESSURE: 132 MMHG | HEART RATE: 92 BPM | DIASTOLIC BLOOD PRESSURE: 87 MMHG | HEIGHT: 64 IN | BODY MASS INDEX: 36.18 KG/M2 | OXYGEN SATURATION: 99 %

## 2023-03-14 LAB
DEPRECATED RDW RBC AUTO: 39.8 FL (ref 37–54)
ERYTHROCYTE [DISTWIDTH] IN BLOOD BY AUTOMATED COUNT: 12.7 % (ref 12.3–15.4)
HCT VFR BLD AUTO: 42.4 % (ref 34–46.6)
HGB BLD-MCNC: 14.3 G/DL (ref 12–15.9)
MCH RBC QN AUTO: 28.9 PG (ref 26.6–33)
MCHC RBC AUTO-ENTMCNC: 33.7 G/DL (ref 31.5–35.7)
MCV RBC AUTO: 85.7 FL (ref 79–97)
PLATELET # BLD AUTO: 321 10*3/MM3 (ref 140–450)
PMV BLD AUTO: 8.9 FL (ref 6–12)
RBC # BLD AUTO: 4.95 10*6/MM3 (ref 3.77–5.28)
WBC NRBC COR # BLD: 7.37 10*3/MM3 (ref 3.4–10.8)

## 2023-03-14 PROCEDURE — 36415 COLL VENOUS BLD VENIPUNCTURE: CPT

## 2023-03-14 PROCEDURE — 85027 COMPLETE CBC AUTOMATED: CPT

## 2023-03-14 RX ORDER — CITALOPRAM 40 MG/1
40 TABLET ORAL EVERY EVENING
COMMUNITY

## 2023-03-14 RX ORDER — DIPHENHYDRAMINE HCL 25 MG
25 CAPSULE ORAL EVERY 6 HOURS PRN
COMMUNITY

## 2023-03-14 NOTE — DISCHARGE INSTRUCTIONS
Take the following medications the morning of surgery:    NONE    If you are on prescription narcotic pain medication to control your pain you may also take that medication the morning of surgery.    General Instructions:  Do not eat solid food after midnight the night before surgery.  You may drink clear liquids day of surgery but must stop at least one hour before your hospital arrival time.  It is beneficial for you to have a clear drink that contains carbohydrates the day of surgery.  We suggest a 12 to 20 ounce bottle of Gatorade or Powerade for non-diabetic patients     Clear liquids are liquids you can see through.  Nothing red in color.     Plain water                               Sports drinks  Sodas                                   Gelatin (Jell-O)  Fruit juices without pulp such as white grape juice and apple juice  Popsicles that contain no fruit or yogurt  Tea or coffee (no cream or milk added)  Gatorade / Powerade  G2 / Powerade Zero      Bring any papers given to you in the doctor’s office.  Wear clean comfortable clothes.  Do not wear contact lenses, false eyelashes or make-up.  Bring a case for your glasses. .  Remove all piercings.  Leave jewelry and any other valuables at home.  Hair extensions with metal clips must be removed prior to surgery.  The Pre-Admission Testing nurse will instruct you to bring medications if unable to obtain an accurate list in Pre-Admission Testing.    REPORT TO SURGERY ENTRANCE ON 3-28-23         Preventing a Surgical Site Infection:  For 2 to 3 days before surgery, avoid shaving with a razor because the razor can irritate skin and make it easier to develop an infection.    Any areas of open skin can increase the risk of a post-operative wound infection by allowing bacteria to enter and travel throughout the body.  Notify your surgeon if you have any skin wounds / rashes even if it is not near the expected surgical site.  The area will need assessed to determine if  surgery should be delayed until it is healed.  The night prior to surgery shower using a fresh bar of anti-bacterial soap (such as Dial) and clean washcloth.  Sleep in a clean bed with clean clothing.  Do not allow pets to sleep with you.  Shower on the morning of surgery using a fresh bar of anti-bacterial soap (such as Dial) and clean washcloth.  Dry with a clean towel and dress in clean clothing.  Ask your surgeon if you will be receiving antibiotics prior to surgery.  Make sure you, your family, and all healthcare providers clean their hands with soap and water or an alcohol based hand  before caring for you or your wound.    Day of surgery:  Your arrival time is approximately two hours before your scheduled surgery time.  Upon arrival, a Pre-op nurse and Anesthesiologist will review your health history, obtain vital signs, and answer questions you may have.  The only belongings needed at this time will be a list of your home medications and if applicable your C-PAP/BI-PAP machine.  A Pre-op nurse will start an IV and you may receive medication in preparation for surgery, including something to help you relax.     Please be aware that surgery does come with discomfort.  We want to make every effort to control your discomfort so please discuss any uncontrolled symptoms with your nurse.   Your doctor will most likely have prescribed pain medications.      If you are going home after surgery you will receive individualized written care instructions before being discharged.  A responsible adult must drive you to and from the hospital on the day of your surgery and stay with you for 24 hours.  Discharge prescriptions can be filled by the hospital pharmacy during regular pharmacy hours.  If you are having surgery late in the day/evening your prescription may be e-prescribed to your pharmacy.  Please verify your pharmacy hours or chose a 24 hour pharmacy to avoid not having access to your prescription because  your pharmacy has closed for the day.        If you have any questions please call Pre-Admission Testing at [phone].  Deductibles and co-payments are collected on the day of service. Please be prepared to pay the required co-pay, deductible or deposit on the day of service as defined by your plan.    Call your surgeon immediately if you experience any of the following symptoms:  Sore Throat  Shortness of Breath or difficulty breathing  Cough  Chills  Body soreness or muscle pain  Headache  Fever  New loss of taste or smell  Do not arrive for your surgery ill.  Your procedure will need to be rescheduled to another time.  You will need to call your physician before the day of surgery to avoid any unnecessary exposure to hospital staff as well as other patients.

## 2023-03-28 ENCOUNTER — HOSPITAL ENCOUNTER (OUTPATIENT)
Facility: HOSPITAL | Age: 28
Setting detail: HOSPITAL OUTPATIENT SURGERY
Discharge: HOME | End: 2023-03-28
Attending: ORTHOPAEDIC SURGERY | Admitting: ORTHOPAEDIC SURGERY
Payer: OTHER MISCELLANEOUS

## 2023-03-28 ENCOUNTER — ANESTHESIA (OUTPATIENT)
Dept: PERIOP | Facility: HOSPITAL | Age: 28
End: 2023-03-28
Payer: OTHER MISCELLANEOUS

## 2023-03-28 ENCOUNTER — ANESTHESIA EVENT (OUTPATIENT)
Dept: PERIOP | Facility: HOSPITAL | Age: 28
End: 2023-03-28
Payer: OTHER MISCELLANEOUS

## 2023-03-28 VITALS
OXYGEN SATURATION: 98 % | TEMPERATURE: 99 F | DIASTOLIC BLOOD PRESSURE: 98 MMHG | HEART RATE: 97 BPM | SYSTOLIC BLOOD PRESSURE: 139 MMHG | RESPIRATION RATE: 16 BRPM

## 2023-03-28 DIAGNOSIS — M25.562 LEFT KNEE PAIN, UNSPECIFIED CHRONICITY: ICD-10-CM

## 2023-03-28 DIAGNOSIS — Z98.890 S/P LEFT KNEE ARTHROSCOPY: Primary | ICD-10-CM

## 2023-03-28 LAB
B-HCG UR QL: NEGATIVE
EXPIRATION DATE: NORMAL
INTERNAL NEGATIVE CONTROL: NEGATIVE
INTERNAL POSITIVE CONTROL: POSITIVE
Lab: NORMAL

## 2023-03-28 PROCEDURE — 25010000002 DEXAMETHASONE SODIUM PHOSPHATE 20 MG/5ML SOLUTION: Performed by: NURSE ANESTHETIST, CERTIFIED REGISTERED

## 2023-03-28 PROCEDURE — 25010000002 FENTANYL CITRATE (PF) 50 MCG/ML SOLUTION: Performed by: NURSE ANESTHETIST, CERTIFIED REGISTERED

## 2023-03-28 PROCEDURE — 25010000002 FENTANYL CITRATE (PF) 100 MCG/2ML SOLUTION: Performed by: NURSE ANESTHETIST, CERTIFIED REGISTERED

## 2023-03-28 PROCEDURE — 25010000002 PROPOFOL 10 MG/ML EMULSION: Performed by: NURSE ANESTHETIST, CERTIFIED REGISTERED

## 2023-03-28 PROCEDURE — 81025 URINE PREGNANCY TEST: CPT | Performed by: ORTHOPAEDIC SURGERY

## 2023-03-28 PROCEDURE — 25010000002 CEFAZOLIN IN DEXTROSE 2-4 GM/100ML-% SOLUTION: Performed by: ORTHOPAEDIC SURGERY

## 2023-03-28 PROCEDURE — 29873 ARTHRS KNEE SURG W/LAT RLS: CPT | Performed by: ORTHOPAEDIC SURGERY

## 2023-03-28 PROCEDURE — 25010000002 ONDANSETRON PER 1 MG: Performed by: NURSE ANESTHETIST, CERTIFIED REGISTERED

## 2023-03-28 PROCEDURE — 25010000002 MIDAZOLAM PER 1 MG: Performed by: ANESTHESIOLOGY

## 2023-03-28 PROCEDURE — 25010000002 HYDROMORPHONE 1 MG/ML SOLUTION: Performed by: NURSE ANESTHETIST, CERTIFIED REGISTERED

## 2023-03-28 RX ORDER — PROMETHAZINE HYDROCHLORIDE 25 MG/1
25 SUPPOSITORY RECTAL ONCE AS NEEDED
Status: DISCONTINUED | OUTPATIENT
Start: 2023-03-28 | End: 2023-03-28 | Stop reason: HOSPADM

## 2023-03-28 RX ORDER — DEXAMETHASONE SODIUM PHOSPHATE 4 MG/ML
INJECTION, SOLUTION INTRA-ARTICULAR; INTRALESIONAL; INTRAMUSCULAR; INTRAVENOUS; SOFT TISSUE AS NEEDED
Status: DISCONTINUED | OUTPATIENT
Start: 2023-03-28 | End: 2023-03-28 | Stop reason: SURG

## 2023-03-28 RX ORDER — HYDRALAZINE HYDROCHLORIDE 20 MG/ML
5 INJECTION INTRAMUSCULAR; INTRAVENOUS
Status: DISCONTINUED | OUTPATIENT
Start: 2023-03-28 | End: 2023-03-28 | Stop reason: HOSPADM

## 2023-03-28 RX ORDER — SODIUM CHLORIDE 0.9 % (FLUSH) 0.9 %
3-10 SYRINGE (ML) INJECTION AS NEEDED
Status: DISCONTINUED | OUTPATIENT
Start: 2023-03-28 | End: 2023-03-28 | Stop reason: HOSPADM

## 2023-03-28 RX ORDER — DEXMEDETOMIDINE HYDROCHLORIDE 100 UG/ML
INJECTION, SOLUTION INTRAVENOUS AS NEEDED
Status: DISCONTINUED | OUTPATIENT
Start: 2023-03-28 | End: 2023-03-28 | Stop reason: SURG

## 2023-03-28 RX ORDER — FAMOTIDINE 10 MG/ML
20 INJECTION, SOLUTION INTRAVENOUS ONCE
Status: COMPLETED | OUTPATIENT
Start: 2023-03-28 | End: 2023-03-28

## 2023-03-28 RX ORDER — NALOXONE HCL 0.4 MG/ML
0.2 VIAL (ML) INJECTION AS NEEDED
Status: DISCONTINUED | OUTPATIENT
Start: 2023-03-28 | End: 2023-03-28 | Stop reason: HOSPADM

## 2023-03-28 RX ORDER — ONDANSETRON 2 MG/ML
4 INJECTION INTRAMUSCULAR; INTRAVENOUS ONCE AS NEEDED
Status: DISCONTINUED | OUTPATIENT
Start: 2023-03-28 | End: 2023-03-28 | Stop reason: HOSPADM

## 2023-03-28 RX ORDER — BUPIVACAINE HYDROCHLORIDE AND EPINEPHRINE 5; 5 MG/ML; UG/ML
INJECTION, SOLUTION EPIDURAL; INTRACAUDAL; PERINEURAL AS NEEDED
Status: DISCONTINUED | OUTPATIENT
Start: 2023-03-28 | End: 2023-03-28 | Stop reason: HOSPADM

## 2023-03-28 RX ORDER — IPRATROPIUM BROMIDE AND ALBUTEROL SULFATE 2.5; .5 MG/3ML; MG/3ML
3 SOLUTION RESPIRATORY (INHALATION) ONCE AS NEEDED
Status: DISCONTINUED | OUTPATIENT
Start: 2023-03-28 | End: 2023-03-28 | Stop reason: HOSPADM

## 2023-03-28 RX ORDER — DOCUSATE SODIUM 100 MG/1
100 CAPSULE, LIQUID FILLED ORAL 2 TIMES DAILY
Qty: 60 CAPSULE | Refills: 0 | Status: SHIPPED | OUTPATIENT
Start: 2023-03-28 | End: 2023-04-06

## 2023-03-28 RX ORDER — ONDANSETRON 2 MG/ML
INJECTION INTRAMUSCULAR; INTRAVENOUS AS NEEDED
Status: DISCONTINUED | OUTPATIENT
Start: 2023-03-28 | End: 2023-03-28 | Stop reason: SURG

## 2023-03-28 RX ORDER — PROMETHAZINE HYDROCHLORIDE 25 MG/1
25 TABLET ORAL ONCE AS NEEDED
Status: DISCONTINUED | OUTPATIENT
Start: 2023-03-28 | End: 2023-03-28 | Stop reason: HOSPADM

## 2023-03-28 RX ORDER — PROPOFOL 10 MG/ML
VIAL (ML) INTRAVENOUS AS NEEDED
Status: DISCONTINUED | OUTPATIENT
Start: 2023-03-28 | End: 2023-03-28 | Stop reason: SURG

## 2023-03-28 RX ORDER — SODIUM CHLORIDE 0.9 % (FLUSH) 0.9 %
3 SYRINGE (ML) INJECTION EVERY 12 HOURS SCHEDULED
Status: DISCONTINUED | OUTPATIENT
Start: 2023-03-28 | End: 2023-03-28 | Stop reason: HOSPADM

## 2023-03-28 RX ORDER — FENTANYL CITRATE 50 UG/ML
50 INJECTION, SOLUTION INTRAMUSCULAR; INTRAVENOUS
Status: DISCONTINUED | OUTPATIENT
Start: 2023-03-28 | End: 2023-03-28 | Stop reason: HOSPADM

## 2023-03-28 RX ORDER — LIDOCAINE HYDROCHLORIDE 10 MG/ML
0.5 INJECTION, SOLUTION EPIDURAL; INFILTRATION; INTRACAUDAL; PERINEURAL ONCE AS NEEDED
Status: DISCONTINUED | OUTPATIENT
Start: 2023-03-28 | End: 2023-03-28 | Stop reason: HOSPADM

## 2023-03-28 RX ORDER — SODIUM CHLORIDE, SODIUM LACTATE, POTASSIUM CHLORIDE, CALCIUM CHLORIDE 600; 310; 30; 20 MG/100ML; MG/100ML; MG/100ML; MG/100ML
9 INJECTION, SOLUTION INTRAVENOUS CONTINUOUS
Status: DISCONTINUED | OUTPATIENT
Start: 2023-03-28 | End: 2023-03-28 | Stop reason: HOSPADM

## 2023-03-28 RX ORDER — EPHEDRINE SULFATE 50 MG/ML
5 INJECTION, SOLUTION INTRAVENOUS ONCE AS NEEDED
Status: DISCONTINUED | OUTPATIENT
Start: 2023-03-28 | End: 2023-03-28 | Stop reason: HOSPADM

## 2023-03-28 RX ORDER — FENTANYL CITRATE 50 UG/ML
INJECTION, SOLUTION INTRAMUSCULAR; INTRAVENOUS AS NEEDED
Status: DISCONTINUED | OUTPATIENT
Start: 2023-03-28 | End: 2023-03-28 | Stop reason: SURG

## 2023-03-28 RX ORDER — LABETALOL HYDROCHLORIDE 5 MG/ML
5 INJECTION, SOLUTION INTRAVENOUS
Status: DISCONTINUED | OUTPATIENT
Start: 2023-03-28 | End: 2023-03-28 | Stop reason: HOSPADM

## 2023-03-28 RX ORDER — DROPERIDOL 2.5 MG/ML
0.62 INJECTION, SOLUTION INTRAMUSCULAR; INTRAVENOUS
Status: DISCONTINUED | OUTPATIENT
Start: 2023-03-28 | End: 2023-03-28 | Stop reason: HOSPADM

## 2023-03-28 RX ORDER — HYDROMORPHONE HYDROCHLORIDE 1 MG/ML
0.5 INJECTION, SOLUTION INTRAMUSCULAR; INTRAVENOUS; SUBCUTANEOUS
Status: DISCONTINUED | OUTPATIENT
Start: 2023-03-28 | End: 2023-03-28 | Stop reason: HOSPADM

## 2023-03-28 RX ORDER — FLUMAZENIL 0.1 MG/ML
0.2 INJECTION INTRAVENOUS AS NEEDED
Status: DISCONTINUED | OUTPATIENT
Start: 2023-03-28 | End: 2023-03-28 | Stop reason: HOSPADM

## 2023-03-28 RX ORDER — HYDROCODONE BITARTRATE AND ACETAMINOPHEN 7.5; 325 MG/1; MG/1
1 TABLET ORAL ONCE AS NEEDED
Status: COMPLETED | OUTPATIENT
Start: 2023-03-28 | End: 2023-03-28

## 2023-03-28 RX ORDER — LIDOCAINE HYDROCHLORIDE 20 MG/ML
INJECTION, SOLUTION EPIDURAL; INFILTRATION; INTRACAUDAL; PERINEURAL AS NEEDED
Status: DISCONTINUED | OUTPATIENT
Start: 2023-03-28 | End: 2023-03-28 | Stop reason: SURG

## 2023-03-28 RX ORDER — HYDROCODONE BITARTRATE AND ACETAMINOPHEN 7.5; 325 MG/1; MG/1
1 TABLET ORAL EVERY 4 HOURS PRN
Qty: 42 TABLET | Refills: 0 | Status: SHIPPED | OUTPATIENT
Start: 2023-03-28 | End: 2023-04-06

## 2023-03-28 RX ORDER — CEFAZOLIN SODIUM 2 G/100ML
2 INJECTION, SOLUTION INTRAVENOUS ONCE
Status: COMPLETED | OUTPATIENT
Start: 2023-03-28 | End: 2023-03-28

## 2023-03-28 RX ORDER — DIPHENHYDRAMINE HYDROCHLORIDE 50 MG/ML
12.5 INJECTION INTRAMUSCULAR; INTRAVENOUS
Status: DISCONTINUED | OUTPATIENT
Start: 2023-03-28 | End: 2023-03-28 | Stop reason: HOSPADM

## 2023-03-28 RX ORDER — OXYCODONE AND ACETAMINOPHEN 7.5; 325 MG/1; MG/1
1 TABLET ORAL EVERY 4 HOURS PRN
Status: DISCONTINUED | OUTPATIENT
Start: 2023-03-28 | End: 2023-03-28 | Stop reason: HOSPADM

## 2023-03-28 RX ORDER — MIDAZOLAM HYDROCHLORIDE 1 MG/ML
1 INJECTION INTRAMUSCULAR; INTRAVENOUS
Status: DISCONTINUED | OUTPATIENT
Start: 2023-03-28 | End: 2023-03-28 | Stop reason: HOSPADM

## 2023-03-28 RX ORDER — ONDANSETRON 4 MG/1
4 TABLET, FILM COATED ORAL EVERY 8 HOURS PRN
Qty: 30 TABLET | Refills: 0 | Status: SHIPPED | OUTPATIENT
Start: 2023-03-28 | End: 2023-04-06

## 2023-03-28 RX ADMIN — LIDOCAINE HYDROCHLORIDE 80 MG: 20 INJECTION, SOLUTION EPIDURAL; INFILTRATION; INTRACAUDAL; PERINEURAL at 14:52

## 2023-03-28 RX ADMIN — DEXMEDETOMIDINE 8 MCG: 100 INJECTION, SOLUTION, CONCENTRATE INTRAVENOUS at 15:13

## 2023-03-28 RX ADMIN — FENTANYL CITRATE 50 MCG: 50 INJECTION, SOLUTION INTRAMUSCULAR; INTRAVENOUS at 14:57

## 2023-03-28 RX ADMIN — PROPOFOL 200 MG: 10 INJECTION, EMULSION INTRAVENOUS at 14:52

## 2023-03-28 RX ADMIN — CEFAZOLIN SODIUM 2 G: 2 INJECTION, SOLUTION INTRAVENOUS at 14:42

## 2023-03-28 RX ADMIN — MIDAZOLAM 1 MG: 1 INJECTION INTRAMUSCULAR; INTRAVENOUS at 14:42

## 2023-03-28 RX ADMIN — DEXAMETHASONE SODIUM PHOSPHATE 8 MG: 4 INJECTION, SOLUTION INTRAMUSCULAR; INTRAVENOUS at 14:52

## 2023-03-28 RX ADMIN — FENTANYL CITRATE 50 MCG: 50 INJECTION, SOLUTION INTRAMUSCULAR; INTRAVENOUS at 15:08

## 2023-03-28 RX ADMIN — ONDANSETRON 4 MG: 2 INJECTION INTRAMUSCULAR; INTRAVENOUS at 15:09

## 2023-03-28 RX ADMIN — PROPOFOL 50 MG: 10 INJECTION, EMULSION INTRAVENOUS at 14:55

## 2023-03-28 RX ADMIN — DEXMEDETOMIDINE 8 MCG: 100 INJECTION, SOLUTION, CONCENTRATE INTRAVENOUS at 14:57

## 2023-03-28 RX ADMIN — HYDROMORPHONE HYDROCHLORIDE 0.5 MG: 1 INJECTION, SOLUTION INTRAMUSCULAR; INTRAVENOUS; SUBCUTANEOUS at 15:20

## 2023-03-28 RX ADMIN — FENTANYL CITRATE 50 MCG: 50 INJECTION, SOLUTION INTRAMUSCULAR; INTRAVENOUS at 15:50

## 2023-03-28 RX ADMIN — HYDROMORPHONE HYDROCHLORIDE 0.5 MG: 1 INJECTION, SOLUTION INTRAMUSCULAR; INTRAVENOUS; SUBCUTANEOUS at 15:13

## 2023-03-28 RX ADMIN — SODIUM CHLORIDE, POTASSIUM CHLORIDE, SODIUM LACTATE AND CALCIUM CHLORIDE 9 ML/HR: 600; 310; 30; 20 INJECTION, SOLUTION INTRAVENOUS at 14:43

## 2023-03-28 RX ADMIN — FAMOTIDINE 20 MG: 10 INJECTION INTRAVENOUS at 14:42

## 2023-03-28 RX ADMIN — HYDROCODONE BITARTRATE AND ACETAMINOPHEN 1 TABLET: 7.5; 325 TABLET ORAL at 15:56

## 2023-03-28 NOTE — H&P
History & Physical       Patient: Carina Galindo    YOB: 1995    Medical Record Number: [id number]    Chief Complaints: Preop    History of Present Illness: 27 y.o. female presents today in anticipation of upcoming surgery.  Denies any changes to medical history.  Denies any changes to her symptomatology.    Allergies:   Allergies   Allergen Reactions   • Eggs Or Egg-Derived Products Swelling     Eyes swell shut, has some eggs cooked in food.   • Other Other (See Comments)     FOOD ALLERGIES: see list : peanuts and ranch dressing     • Nitrofurantoin Unknown (See Comments) and Rash     Unsure    Unsure   • Amoxicillin Rash   • Ciprofloxacin Rash   • Erythromycin Rash   • Ibuprofen Rash   • Multihance [Gadobenate] Hives     Patient experienced one small hive on neck. Was instructed to be pre-medicated per radiologist prior to MRI's for future.    • Sulfa Antibiotics Rash   • Triamcinolone Rash       Home Medications:    Current Facility-Administered Medications:   •  ceFAZolin in dextrose (ANCEF) IVPB solution 2 g, 2 g, Intravenous, Once, Tapan Craig MD  •  ethyl alcohol 62 % 2 each, 2 swab, Nasal, Once, Tapan Craig MD    Past Medical History:   Diagnosis Date   • Anxiety    • Asthma    • GERD (gastroesophageal reflux disease) 2010   • Iron deficiency anemia    • Knee meniscus pain, left    • Liver cyst     BX AND FOUND TO BE NEGATIVE   • OCD (obsessive compulsive disorder)           Past Surgical History:   Procedure Laterality Date   • ADENOIDECTOMY     • CHOLECYSTECTOMY WITH INTRAOPERATIVE CHOLANGIOGRAM N/A 07/14/2016    Procedure: CHOLECYSTECTOMY LAPAROSCOPIC INTRAOPERATIVE CHOLANGIOGRAM;  Surgeon: Malathi Lozano MD;  Location: [facility];  Service:    • COLONOSCOPY  2010   • ENDOSCOPY  11/01/2013    Gastritis    • NASAL SEPTUM SURGERY     • SHOULDER SURGERY Right 2016   • TONSILLECTOMY      x 2 due to regrowth   • UPPER GASTROINTESTINAL ENDOSCOPY  2010   • WISDOM TOOTH EXTRACTION             Social History     Occupational History   • Occupation:    Tobacco Use   • Smoking status: Never   • Smokeless tobacco: Never   Vaping Use   • Vaping Use: Never used   Substance and Sexual Activity   • Alcohol use: No   • Drug use: Never   • Sexual activity: Yes     Partners: Male     Birth control/protection: Condom, OCP      Social History     Social History Narrative   • Not on file          Family History   Problem Relation Age of Onset   • Gallbladder disease Mother 30   • Rheum arthritis Mother    • Hypertension Mother    • Endometriosis Mother    • Irritable bowel syndrome Mother    • Pancreatitis Mother    • Diabetes Father    • Breast cancer Maternal Grandmother    • Heart disease Maternal Grandfather    • Malig Hyperthermia Neg Hx        Review of Systems:  A 14 point review of systems is reviewed with the patient.  Pertinent positives are listed above.  All others are negative.    Physical Exam: 27 y.o. female    Vitals:    03/28/23 1357   BP: 147/98   BP Location: Left arm   Patient Position: Sitting   Pulse: 93   Resp: 16   Temp: 98.2 °F (36.8 °C)   TempSrc: Oral   SpO2: 99%       General:  Patient is awake and alert.  Appears in no acute distress or discomfort.    Psych:  Affect and demeanor are appropriate.    Eyes:  Conjunctiva and sclera appear grossly normal.  Eyes track well and EOM seem to be intact.    Ears:  No gross abnormalities.  Hearing adequate for the exam.    Cardiovascular:  Regular rate and rhythm.    Lungs:  Good chest expansion.  Breathing unlabored.    Lymph:  No palpable adenopathy about neck or axilla.    Extremity:  Skin benign and intact without evidence for swelling, masses or atrophy.  Exam otherwise deferred at this time.    Diagnostic Tests:  Lab Results   Component Value Date    GLUCOSE 79 05/23/2019    CALCIUM 10.1 05/23/2019     05/23/2019    K 4.5 05/23/2019    CO2 23.7 05/23/2019     05/23/2019    BUN 10 05/23/2019     CREATININE 0.59 05/23/2019    EGFRIFAFRI 149 05/23/2019    EGFRIFNONA 123 05/23/2019    BCR 16.9 05/23/2019    ANIONGAP 13.3 06/25/2016     Lab Results   Component Value Date    WBC 7.37 03/14/2023    HGB 14.3 03/14/2023    HCT 42.4 03/14/2023    MCV 85.7 03/14/2023     03/14/2023     Lab Results   Component Value Date    INR 0.9 11/15/2019    PROTIME 11.4 (L) 11/15/2019       Assessment:  Left knee pain status post work injury with lateral patellar tilt and patellofemoral chondromalacia    Plan: Her symptoms remain unchanged.  She continues to have pain with occasional giving way, clicking and popping.  Plan is to proceed with diagnostic arthroscopy and likely lateral release.  We will plan to address any other pathology amenable to arthroscopic intervention.  I once again explained to her that there are no guarantees that this will help.  She understands.    Tapan Craig MD  03/28/23

## 2023-03-28 NOTE — OP NOTE
Orthopaedic Operative Note    Facility: [facility]    Patient: Carina Galindo    Medical Record Number: [id number]    YOB: 1995    Dictating Surgeon: Tapan Craig M.D.*    Primary Care Physician: Jozef Ivey DO    Date of Operation: 3/28/2023    Pre-Operative Diagnosis:  Left knee pain with lateral patellar tilt, patellofemoral chondromalacia, parameniscal cyst     Post-Operative Diagnosis:  Left lateral patellar tilt, patellofemoral chondromalacia, parameniscal cyst adjacent to medial meniscus and hyperplastic synovium    Procedure Performed:  Left knee arthroscopy with lateral release, limited synovectomy and removal of parameniscal cyst    Surgeon: Tapan Craig MD     Assistant: Amaya Sanders whose assistance was critical for help with patient positioning, suctioning and irrigation, retraction, manipulation of the extremity for insertion of the implants, wound closure and application of the bandages.  Her assistance was critical to the success of this case.     Anesthesia: Gen. followed by local anesthesia    Complications: None.     Estimated Blood Loss: Less than 50 mL.     Specimens: * No orders in the log *    Implants: None    Brief Operative Indication: Carina had a history of persistent left knee pain after a work injury.  She had tried and failed extensive conservative treatment.  The risks, benefits, and alternatives to arthroscopic intervention were discussed in detail.  She acknowledged understanding of the information and consented to proceed.    Description of the procedure in detail:  The patient and operative site were identified in the preoperative holding area.  The surgical site was marked.  The patient was then taken to the operating room and placed in the supine position.  Adequate general anesthesia was administered.  The patient was repositioned on the operating table.    A timeout was taken and preoperative antibiotics administered.  All bony  prominences were carefully padded and protected.  The left lower extremity was prepped and draped in the standard sterile fashion.  I cleaned the extremity with an alcohol solution.  A Hibiclens scrub was performed and then the extremity was prepped with 2 ChloraPrep preps.  I allowed those to dry for 3 minutes before the draping procedure was carried out.    The extremity was exsanguinated with an Esmarch bandage and the tourniquet insufflated to 250 mmHg.  I began the procedure itself by fashioning a standard anterolateral portal.  The scope was inserted into the joint.  At this point, a diagnostic arthroscopy was performed.    The patellofemoral compartment demonstrated an area of grade III chondromalacia of the lateral facet and central ridge.  This involved about 20% of the articular surface.  There were some loose chondral flaps which warranted abrasion chondroplasty.  A shaver was used to contour those back to a stable rim.  She was noted to have lateral patellar tilt and hyperplastic synovium of the lateral patellofemoral retinaculum.    I then entered the medial compartment.  A standard anteromedial portal was established using needle localization technique.  A probe was inserted.  Her articular cartilage was pristine.  The medial meniscus was probed and confirmed to be intact.  She had a small parameniscal cyst adjacent to the anterior horn.  I could not identify any associated meniscus tear.  A shaver was used to remove the parameniscal cyst.  No further pathology was noted in this compartment.    I then directed my attention to the notch.  The ACL and PCL were intact.  The ACL was stable when probed.    The leg was placed in a figure 4 position and the lateral compartment entered.  The articular cartilage of the compartment was pristine.  Her lateral meniscus was completely intact and stable when probed.  No pathology was noted in this compartment.    I directed my attention back to the patellofemoral  compartment.  I switched the scope into the medial portal so that I could instrument through the lateral portal.  A lateral release was performed in the typical fashion.  After the lateral release, I confirmed that the patellar tilt was corrected.  No further pathology was noted.     At this point, final images were taken and saved.  I then directed my attention to closure.  I made one final pass through all 3 compartments as well as the medial and lateral gutters.  No further pathology was identified.  The tourniquet was deflated and I made sure we had good hemostasis.  The instruments were withdrawn.  The wounds were copiously irrigated out with sterile saline and then closed in a layered fashion.  Both wounds were infiltrated with local anesthetic.  Sterile dressings were applied.  She was awakened and taken to the recovery room in good condition.    Tapan Craig MD  03/28/23

## 2023-03-28 NOTE — ANESTHESIA PREPROCEDURE EVALUATION
Anesthesia Evaluation     NPO Solid Status: > 8 hours             Airway   Mallampati: II  TM distance: >3 FB  Neck ROM: full  no difficulty expected  Dental - normal exam     Pulmonary - normal exam   (+) asthma,  (-) wheezes  Cardiovascular - negative cardio ROS and normal exam    Rhythm: regular    (-) murmur      Neuro/Psych  (+) psychiatric history Anxiety,    GI/Hepatic/Renal/Endo    (+) obesity,       Musculoskeletal (-) negative ROS    Abdominal  - normal exam   Substance History      OB/GYN          Other                          Anesthesia Plan    ASA 2     general     (    .)  intravenous induction     Anesthetic plan, risks, benefits, and alternatives have been provided, discussed and informed consent has been obtained with: patient.

## 2023-03-28 NOTE — ANESTHESIA POSTPROCEDURE EVALUATION
Patient: Carina Galindo    Procedure Summary     Date: 03/28/23 Room / Location:  [facility] [address] [place] [address] [facility]    Anesthesia Start: 1448 Anesthesia Stop: 1531    Procedure: Knee Arthroscopy with lateral release, meniscectomy and cyst excision (Left: Knee) Diagnosis:       Left knee pain, unspecified chronicity      (Left knee pain, unspecified chronicity [M25.562])    Surgeons: Tapan Craig MD Provider: Michele Zhou MD    Anesthesia Type: general ASA Status: 2          Anesthesia Type: general    Vitals  Vitals Value Taken Time   /77 03/28/23 1630   Temp     Pulse 89 03/28/23 1638   Resp     SpO2 99 % 03/28/23 1638   Vitals shown include unvalidated device data.        Post Anesthesia Care and Evaluation    Patient location during evaluation: bedside  Patient participation: complete - patient participated  Level of consciousness: awake  Pain management: adequate    Airway patency: patent  Anesthetic complications: No anesthetic complications    Cardiovascular status: acceptable  Respiratory status: acceptable  Hydration status: acceptable    Comments: */77   Pulse 90   Temp 36.8 °C (98.2 °F) (Oral)   Resp 16   LMP 03/01/2023   SpO2 100%

## 2023-03-28 NOTE — BRIEF OP NOTE
KNEE ARTHROSCOPY  Progress Note    Carina Galindo  3/28/2023    Pre-op Diagnosis:   Left knee pain, unspecified chronicity [M25.562]       Post-Op Diagnosis Codes:     * Left knee pain, unspecified chronicity [M25.562]    Procedure/CPT® Codes:        Procedure(s):  Knee Arthroscopy with lateral release, meniscectomy and cyst excision        Surgeon(s):  Tapan Craig MD    Anesthesia: General    Staff:   Circulator: Mary Gr RN  Scrub Person: Jeny Tam RN; Beverley Piña  Vendor Representative: Harris Kraft  Assistant: Amaya aSnders  Assistant: Amaya Sanders      Estimated Blood Loss: minimal    Urine Voided: * No values recorded between 3/28/2023  2:48 PM and 3/28/2023  3:21 PM *    Specimens:                None          Drains: * No LDAs found *    Findings: See dictation        Complications: None    Assistant: Amaya Sanders  was responsible for performing the following activities: Retraction and their skilled assistance was necessary for the success of this case.    Tapan Craig MD     Date: 3/28/2023  Time: 15:26 EDT

## 2023-03-28 NOTE — ANESTHESIA PROCEDURE NOTES
Airway  Urgency: elective    Date/Time: 3/28/2023 2:53 PM    General Information and Staff    Patient location during procedure: OR  CRNA/CAA: Jodie Pollack CRNA    Indications and Patient Condition  Indications for airway management: airway protection    Preoxygenated: yes  Mask difficulty assessment: 1 - vent by mask    Final Airway Details  Final airway type: supraglottic airway      Successful airway: unique  Size 4     Number of attempts at approach: 1  Assessment: lips, teeth, and gum same as pre-op and atraumatic intubation

## 2023-04-05 ENCOUNTER — OFFICE VISIT (OUTPATIENT)
Dept: ORTHOPEDIC SURGERY | Facility: CLINIC | Age: 28
End: 2023-04-05
Payer: OTHER MISCELLANEOUS

## 2023-04-05 VITALS — HEIGHT: 64 IN | BODY MASS INDEX: 36.84 KG/M2 | WEIGHT: 215.8 LBS | TEMPERATURE: 98.2 F

## 2023-04-05 DIAGNOSIS — Z09 SURGERY FOLLOW-UP: Primary | ICD-10-CM

## 2023-04-05 PROCEDURE — 99024 POSTOP FOLLOW-UP VISIT: CPT | Performed by: NURSE PRACTITIONER

## 2023-04-05 NOTE — LETTER
April 5, 2023     Patient: Carina Galindo   YOB: 1995   Date of Visit: 4/5/2023       To Whom It May Concern:    It is my medical opinion that Carina Galindo should remain out of work until follow up with Dr. Craig in 4 weeks for reevaluation.         Sincerely,        ZACH Eaton    CC:   No Recipients

## 2023-04-06 NOTE — PROGRESS NOTES
Carina Galindo : 1995 MRN: [id number] DATE: 2023    CC: 1 week s/p left knee scope     HPI:  Pt. returns to clinic today for follow up.  Denies any wound problems including redness, drainage.  No fevers, chills, sweats.  Mobilizing well.  No complaints.    Vitals:    23 1606   Temp: 98.2 °F (36.8 °C)       Exam:  Wounds appear well-approximated without any erythema or drainage.  Calf soft.  Negative Christina's sign.  Good motor and sensory function in foot.  Good pedal pulses.  Gait is mildly antalgic but otherwise reciprocal heel-toe.    Imaging   none    Impression:  1 week s/p left knee scope with lateral release, limited synovectomy and removal of parameniscal cyst    Plan:    1.  Sutures removed and replaced with steri-strips.  2.  Continue to progress activity gradually as tolerated.  3.  Continue to ice knee as needed, especially after activity.  4.  I have given her a referral for physical therapy.  5.  Follow up in 1 month with Dr. Craig.    Bertha Price, APRN    2023

## 2023-05-08 ENCOUNTER — OFFICE VISIT (OUTPATIENT)
Dept: ORTHOPEDIC SURGERY | Facility: CLINIC | Age: 28
End: 2023-05-08
Payer: OTHER MISCELLANEOUS

## 2023-05-08 VITALS — WEIGHT: 215.2 LBS | BODY MASS INDEX: 36.74 KG/M2 | TEMPERATURE: 97.1 F | HEIGHT: 64 IN

## 2023-05-08 DIAGNOSIS — Z09 SURGERY FOLLOW-UP: ICD-10-CM

## 2023-05-08 DIAGNOSIS — R52 PAIN: Primary | ICD-10-CM

## 2023-05-08 NOTE — PROGRESS NOTES
Carina Galindo : 1995 MRN: [id number] DATE: 2023    CC: 6 weeks s/p left knee scope with lateral release and cyst excision    HPI: Patient returns to clinic today for follow up.  Reports some soreness but knee is progressing well overall.  She does feel like her symptoms are better than before the surgery.  I had ordered therapy for her last visit.  Apparently that was not approved.  She still feels like the leg is weak.  Her job involves a lot of standing and walking.  She is not ready to go back to work.    Vitals:    23 1037   Temp: 97.1 °F (36.2 °C)       Exam:  Wounds appear well healed.  Calf soft.  Negative Christina's sign.  She does have some quad atrophy compared to the contralateral side.  Full knee motion.  Good motor and sensory function in foot.  Good pedal pulses.  Gait appears normal.    Imaging  Bilateral standing AP views, bilateral merchants views, and a lateral view of the left knee are ordered by myself and reviewed to evaluate her complaint.  These are compared to previous x-rays.  The x-rays show no concerning findings.  Patella appears centered in the trochlea.  No significant patellar tilt.    Impression:  6 weeks s/p left knee lateral release and cyst excision    Plan:    She seems to be doing okay.  As stated above, her job involves her being on her feet all day and she does not feel like she is ready to go back just yet.  I consider that she could benefit from PT.  I think if we could get her 6 weeks of PT that she should be fine to go back to work.  I will try to get that approved for her.  We will set her up another visit with me in 6 weeks.    Tapan Craig MD    2023

## 2023-05-08 NOTE — LETTER
May 8, 2023     Patient: Carina Galindo   YOB: 1995   Date of Visit: 5/8/2023       To Whom It May Concern:    It is my medical opinion that Carina Galindo should remain out of work until next office visit .           Sincerely,        Tapan Craig MD    CC:   No Recipients

## 2023-05-25 ENCOUNTER — TELEPHONE (OUTPATIENT)
Dept: ORTHOPEDIC SURGERY | Facility: CLINIC | Age: 28
End: 2023-05-25
Payer: COMMERCIAL

## 2023-05-25 NOTE — TELEPHONE ENCOUNTER
Tasha with [facility] calling to see if there is a protocol she needs follow for pt's PT appt today. Please advise as to which one should be sent.     Fax: [phone]

## 2023-05-25 NOTE — TELEPHONE ENCOUNTER
I spoke to Tasha at [facility].  I provided her with information regarding the patient's surgical procedure.  I instructed her to gradually progress range of motion and strengthening.  No weight bearing restrictions.  She verbalized understanding with read back.

## 2023-06-19 ENCOUNTER — OFFICE VISIT (OUTPATIENT)
Dept: ORTHOPEDIC SURGERY | Facility: CLINIC | Age: 28
End: 2023-06-19
Payer: OTHER MISCELLANEOUS

## 2023-06-19 VITALS — TEMPERATURE: 97.7 F | BODY MASS INDEX: 36.6 KG/M2 | HEIGHT: 64 IN | WEIGHT: 214.4 LBS

## 2023-06-19 DIAGNOSIS — Z09 SURGERY FOLLOW-UP: Primary | ICD-10-CM

## 2023-06-19 NOTE — LETTER
June 19, 2023     Patient: Carina Galindo   YOB: 1995   Date of Visit: 6/19/2023       To Whom It May Concern:    It is my medical opinion that Carina Galindo may return to work on July 10, 2023 without restriction or limitation .           Sincerely,        Tapan Craig MD    CC:   No Recipients

## 2023-06-19 NOTE — PROGRESS NOTES
Chief complaint: Follow-up status post left knee arthroscopy with lateral release and cyst excision    Carina follows up for her left knee.  She says it is doing much better.  She says that her symptoms are tremendously improved compared to before surgery.  She reports that therapy seems to be helping quite a bit.  She has 3 more weeks of PT scheduled.  She does not feel like she is quite yet ready to go back to work.  She would like to complete the PT.  Her job is very demanding and requires her to be on her feet quite a bit.    Her surgical incisions are healed.  No tenderness.  Full symmetric motion.  Good strength with knee extension.  Patella tracks well.  No apprehension with lateral translation.  No significant tenderness over the patellofemoral retinaculum.    Assessment: 3-month status post left knee lateral release and cyst excision    Plan: I am going to have her complete the PT.  She is scheduled for 2 sessions a week for the next 3 weeks.  I will release her to go back to work without restriction on July 10.  I will consider her to be at maximal medical improvement as of that date.  She can follow-up with me as needed.    Tapan Craig MD

## 2023-12-22 ENCOUNTER — TELEPHONE (OUTPATIENT)
Dept: OBSTETRICS AND GYNECOLOGY | Age: 28
End: 2023-12-22
Payer: OTHER MISCELLANEOUS

## 2023-12-22 NOTE — TELEPHONE ENCOUNTER
Pt received (+) at home pregnancy test & is calling to schedule confirmation appt. LMP 11/11/23. Pt used to be seen at [facility] but would now like to be seen at [facility]. Please advise on scheduling.

## 2024-01-09 ENCOUNTER — OFFICE VISIT (OUTPATIENT)
Dept: OBSTETRICS AND GYNECOLOGY | Age: 29
End: 2024-01-09
Payer: COMMERCIAL

## 2024-01-09 VITALS
SYSTOLIC BLOOD PRESSURE: 126 MMHG | WEIGHT: 219 LBS | DIASTOLIC BLOOD PRESSURE: 74 MMHG | BODY MASS INDEX: 37.39 KG/M2 | HEIGHT: 64 IN

## 2024-01-09 DIAGNOSIS — Z13.89 SCREENING FOR BLOOD OR PROTEIN IN URINE: ICD-10-CM

## 2024-01-09 DIAGNOSIS — O21.9 NAUSEA AND VOMITING DURING PREGNANCY: ICD-10-CM

## 2024-01-09 DIAGNOSIS — Z34.81 ENCOUNTER FOR SUPERVISION OF OTHER NORMAL PREGNANCY IN FIRST TRIMESTER: ICD-10-CM

## 2024-01-09 DIAGNOSIS — R76.8 RHEUMATOID FACTOR POSITIVE: Primary | ICD-10-CM

## 2024-01-09 PROBLEM — Z34.90 SUPERVISION OF NORMAL PREGNANCY: Status: ACTIVE | Noted: 2024-01-09

## 2024-01-09 PROBLEM — Z30.41 ENCOUNTER FOR SURVEILLANCE OF CONTRACEPTIVE PILLS: Status: RESOLVED | Noted: 2018-10-01 | Resolved: 2024-01-09

## 2024-01-09 PROBLEM — Z12.4 SCREENING FOR CERVICAL CANCER: Status: RESOLVED | Noted: 2018-10-01 | Resolved: 2024-01-09

## 2024-01-09 LAB
GLUCOSE UR STRIP-MCNC: NEGATIVE MG/DL
PROT UR STRIP-MCNC: NEGATIVE MG/DL

## 2024-01-09 RX ORDER — ASPIRIN 81 MG/1
TABLET ORAL
Qty: 90 TABLET | Refills: 3 | Status: SHIPPED | OUTPATIENT
Start: 2024-01-09

## 2024-01-09 RX ORDER — ONDANSETRON 4 MG/1
4 TABLET, FILM COATED ORAL EVERY 6 HOURS PRN
Qty: 40 TABLET | Refills: 1 | Status: SHIPPED | OUTPATIENT
Start: 2024-01-09 | End: 2024-01-11

## 2024-01-09 RX ORDER — PRENATAL VIT/IRON FUM/FOLIC AC 27MG-0.8MG
TABLET ORAL DAILY
COMMUNITY

## 2024-01-09 RX ORDER — DOXYLAMINE SUCCINATE AND PYRIDOXINE HYDROCHLORIDE, DELAYED RELEASE TABLETS 10 MG/10 MG 10; 10 MG/1; MG/1
1-2 TABLET, DELAYED RELEASE ORAL EVERY 6 HOURS PRN
Qty: 60 TABLET | Refills: 2 | Status: SHIPPED | OUTPATIENT
Start: 2024-01-09 | End: 2024-01-11
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
## 2024-01-11 ENCOUNTER — TELEPHONE (OUTPATIENT)
Dept: OBSTETRICS AND GYNECOLOGY | Age: 29
End: 2024-01-11
Payer: COMMERCIAL

## 2024-01-11 ENCOUNTER — ROUTINE PRENATAL (OUTPATIENT)
Dept: OBSTETRICS AND GYNECOLOGY | Age: 29
End: 2024-01-11
Payer: COMMERCIAL

## 2024-01-11 DIAGNOSIS — S39.91XA ABDOMINAL TRAUMA, INITIAL ENCOUNTER: ICD-10-CM

## 2024-01-11 DIAGNOSIS — Z34.01 ENCOUNTER FOR SUPERVISION OF NORMAL FIRST PREGNANCY IN FIRST TRIMESTER: Primary | ICD-10-CM

## 2024-01-11 LAB
AMPHETAMINES UR QL SCN: NEGATIVE NG/ML
BACTERIA UR CULT: NORMAL
BACTERIA UR CULT: NORMAL
BARBITURATES UR QL SCN: NEGATIVE NG/ML
BENZODIAZ UR QL: NEGATIVE NG/ML
BZE UR QL: NEGATIVE NG/ML
C TRACH RRNA SPEC QL NAA+PROBE: NEGATIVE
CANNABINOIDS UR QL SCN: NEGATIVE NG/ML
GLUCOSE UR STRIP-MCNC: NEGATIVE MG/DL
METHADONE UR QL SCN: NEGATIVE NG/ML
N GONORRHOEA RRNA SPEC QL NAA+PROBE: NEGATIVE
OPIATES UR QL: NEGATIVE NG/ML
PCP UR QL SCN: NEGATIVE NG/ML
PROPOXYPH UR QL SCN: NEGATIVE NG/ML
PROT UR STRIP-MCNC: NEGATIVE MG/DL
T VAGINALIS RRNA SPEC QL NAA+PROBE: NEGATIVE
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
## 2024-01-15 ENCOUNTER — TELEPHONE (OUTPATIENT)
Dept: OBSTETRICS AND GYNECOLOGY | Age: 29
End: 2024-01-15
Payer: COMMERCIAL

## 2024-01-15 DIAGNOSIS — O99.891 SYSTEMIC LUPUS COMPLICATING PREGNANCY: Primary | ICD-10-CM

## 2024-01-15 DIAGNOSIS — M32.9 SYSTEMIC LUPUS COMPLICATING PREGNANCY: Primary | ICD-10-CM

## 2024-01-15 NOTE — TELEPHONE ENCOUNTER
Called and reviewed with her that she can start steroids now, she does have significant joint pain and swelling and seems to be indicated.  After the first trimester then can start Plaquenil.  I will leave a message with her rheumatologist's office as well    ----- Message from ZACH Montez sent at 1/11/2024 12:15 PM EST -----  Pt sees rheumatology and they wanted to start her on a low dose steroid pack for swelling/joint pain. They are thinking she may have lupus. Considering plaquenil if the steroids help. Wasn't sure if this was recommended at this point. Thanks!

## 2024-01-16 PROBLEM — D68.61 ANTIPHOSPHOLIPID ANTIBODY SYNDROME COMPLICATING PREGNANCY: Status: ACTIVE | Noted: 2024-01-16

## 2024-01-16 PROBLEM — O99.891 SYSTEMIC LUPUS ERYTHEMATOSUS, MATERNAL, ANTEPARTUM: Status: ACTIVE | Noted: 2024-01-16

## 2024-01-16 PROBLEM — M32.9 SYSTEMIC LUPUS ERYTHEMATOSUS, MATERNAL, ANTEPARTUM: Status: ACTIVE | Noted: 2024-01-16

## 2024-01-16 PROBLEM — O99.119 ANTIPHOSPHOLIPID ANTIBODY SYNDROME COMPLICATING PREGNANCY: Status: ACTIVE | Noted: 2024-01-16

## 2024-01-16 LAB
ABO GROUP BLD: NORMAL
APTT HEX PL PPP: 21 SEC
APTT IMM NP PPP: ABNORMAL SEC
APTT PPP 1:1 SALINE: ABNORMAL SEC
APTT PPP: 29.2 SEC
B2 GLYCOPROT1 IGA SER-ACNC: <10 SAU
B2 GLYCOPROT1 IGG SER-ACNC: <10 SGU
B2 GLYCOPROT1 IGM SER-ACNC: <10 SMU
BASOPHILS # BLD AUTO: 0.1 X10E3/UL (ref 0–0.2)
BASOPHILS NFR BLD AUTO: 1 %
BLD GP AB SCN SERPL QL: NEGATIVE
BUN SERPL-MCNC: 7 MG/DL (ref 6–20)
BUN/CREAT SERPL: 16 (ref 9–23)
CALCIUM SERPL-MCNC: 9.2 MG/DL (ref 8.7–10.2)
CARDIOLIPIN IGG SER IA-ACNC: 9 GPL U/ML (ref 0–14)
CARDIOLIPIN IGG SER IA-ACNC: <10 GPL
CARDIOLIPIN IGM SER IA-ACNC: 22 MPL
CARDIOLIPIN IGM SER IA-ACNC: 24 MPL U/ML (ref 0–12)
CHLORIDE SERPL-SCNC: 103 MMOL/L (ref 96–106)
CO2 SERPL-SCNC: 18 MMOL/L (ref 20–29)
CONFIRM APTT: 1 SEC
CONFIRM DRVVT: 35.9 SEC
CREAT SERPL-MCNC: 0.45 MG/DL (ref 0.57–1)
DRVVT SCREEN TO CONFIRM RATIO: 1.5 RATIO
EGFRCR SERPLBLD CKD-EPI 2021: 133 ML/MIN/1.73
ENA SS-A AB SER-ACNC: <0.2 AI (ref 0–0.9)
ENA SS-B AB SER-ACNC: <0.2 AI (ref 0–0.9)
EOSINOPHIL # BLD AUTO: 0.2 X10E3/UL (ref 0–0.4)
EOSINOPHIL NFR BLD AUTO: 3 %
ERYTHROCYTE [DISTWIDTH] IN BLOOD BY AUTOMATED COUNT: 12.4 % (ref 11.7–15.4)
GLUCOSE SERPL-MCNC: 87 MG/DL (ref 70–99)
HBA1C MFR BLD: 5.3 % (ref 4.8–5.6)
HBV SURFACE AG SERPL QL IA: NEGATIVE
HCT VFR BLD AUTO: 37.4 % (ref 34–46.6)
HCV IGG SERPL QL IA: NON REACTIVE
HGB A MFR BLD ELPH: 97.4 % (ref 96.4–98.8)
HGB A2 MFR BLD ELPH: 2.6 % (ref 1.8–3.2)
HGB BLD-MCNC: 12.5 G/DL (ref 11.1–15.9)
HGB F MFR BLD ELPH: 0 % (ref 0–2)
HGB FRACT BLD-IMP: NORMAL
HGB S MFR BLD ELPH: 0 %
HIV 1+2 AB+HIV1 P24 AG SERPL QL IA: NON REACTIVE
IMM GRANULOCYTES # BLD AUTO: 0.1 X10E3/UL (ref 0–0.1)
IMM GRANULOCYTES NFR BLD AUTO: 1 %
INR PPP: 1 RATIO
LABORATORY COMMENT REPORT: ABNORMAL
LYMPHOCYTES # BLD AUTO: 1.3 X10E3/UL (ref 0.7–3.1)
LYMPHOCYTES NFR BLD AUTO: 16 %
MCH RBC QN AUTO: 28.5 PG (ref 26.6–33)
MCHC RBC AUTO-ENTMCNC: 33.4 G/DL (ref 31.5–35.7)
MCV RBC AUTO: 85 FL (ref 79–97)
MONOCYTES # BLD AUTO: 0.6 X10E3/UL (ref 0.1–0.9)
MONOCYTES NFR BLD AUTO: 7 %
NEUTROPHILS # BLD AUTO: 5.6 X10E3/UL (ref 1.4–7)
NEUTROPHILS NFR BLD AUTO: 72 %
PLATELET # BLD AUTO: 431 X10E3/UL (ref 150–450)
POTASSIUM SERPL-SCNC: 4.2 MMOL/L (ref 3.5–5.2)
PROTHROMBIN TIME: 10.9 SEC
RBC # BLD AUTO: 4.38 X10E6/UL (ref 3.77–5.28)
RH BLD: POSITIVE
RPR SER QL: NON REACTIVE
RUBV IGG SERPL IA-ACNC: 1.65 INDEX
SCREEN DRVVT: 61.4 SEC
SODIUM SERPL-SCNC: 139 MMOL/L (ref 134–144)
THROMBIN TIME: 17.5 SEC
VZV IGG SER IA-ACNC: 709 INDEX
WBC # BLD AUTO: 7.7 X10E3/UL (ref 3.4–10.8)

## 2024-01-16 RX ORDER — ENOXAPARIN SODIUM 100 MG/ML
40 INJECTION SUBCUTANEOUS
Qty: 12 ML | Refills: 11 | Status: SHIPPED | OUTPATIENT
Start: 2024-01-16

## 2024-01-16 RX ORDER — GLUCOSAMINE HCL/CHONDROITIN SU 500-400 MG
1 CAPSULE ORAL DAILY
Qty: 100 EACH | Refills: 11 | Status: SHIPPED | OUTPATIENT
Start: 2024-01-16

## 2024-01-18 ENCOUNTER — PATIENT MESSAGE (OUTPATIENT)
Dept: OBSTETRICS AND GYNECOLOGY | Age: 29
End: 2024-01-18
Payer: COMMERCIAL

## 2024-01-18 NOTE — TELEPHONE ENCOUNTER
From: Carina Galindo  To: Pauline Clements  Sent: 1/18/2024 1:27 PM EST  Subject: Baby Aspirin and Lovenox    Do I still take the baby aspirin with the Lovenox or do I need to stop taking that? Also, does the injection just go in my stomach or can it go in my thigh or behind my arm as well?    Thank you,  Carina Galindo

## 2024-01-22 ENCOUNTER — TELEPHONE (OUTPATIENT)
Dept: ORTHOPEDIC SURGERY | Facility: CLINIC | Age: 29
End: 2024-01-22
Payer: COMMERCIAL

## 2024-01-22 NOTE — TELEPHONE ENCOUNTER
I have called patient to assist. Patient states the Right knee is no longer bothering her and does not need an appointment. Let patient know should she need anything please to not hesitate to call the office. Patient thanked me for the call and call ended.

## 2024-01-22 NOTE — TELEPHONE ENCOUNTER
Caller: DENNIS ODEN    Relationship: PAT PCP     Best call back number: [phone] (home)   PATIENTS CB NUMBER.     Who is your current provider: DR. ACOSTA      Who would you like your new provider to be: DR. VERDIN    What are your reasons for transferring care: N/A    Additional notes: PATIENTS PCP CALLED IN TO GET PATIENT BOOKED WITH DR. VERDIN EDU PATIENT IS ALREADY EST WITH A PROVIDER WHO TREATS FOR WHAT SHE IS NEEDING ASSISTANCE WITH . OFFICE UNSURE WHY PATIENT IS WANTING TO SEE DR. VERDIN VS DR. ACOSTA. PATIENT IS HAVING RIGHT KNEE PAIN NOW. EDU WE WILL REACH OUT TO PATIENT WITHIN 48 BUS HRS.

## 2024-01-24 ENCOUNTER — INITIAL PRENATAL (OUTPATIENT)
Dept: OBSTETRICS AND GYNECOLOGY | Age: 29
End: 2024-01-24
Payer: COMMERCIAL

## 2024-01-24 VITALS — WEIGHT: 220 LBS | DIASTOLIC BLOOD PRESSURE: 70 MMHG | SYSTOLIC BLOOD PRESSURE: 124 MMHG | BODY MASS INDEX: 37.76 KG/M2

## 2024-01-24 DIAGNOSIS — O99.119 ANTIPHOSPHOLIPID ANTIBODY SYNDROME COMPLICATING PREGNANCY: Primary | ICD-10-CM

## 2024-01-24 DIAGNOSIS — O99.891 SYSTEMIC LUPUS ERYTHEMATOSUS, MATERNAL, ANTEPARTUM: ICD-10-CM

## 2024-01-24 DIAGNOSIS — Z34.81 ENCOUNTER FOR SUPERVISION OF OTHER NORMAL PREGNANCY IN FIRST TRIMESTER: ICD-10-CM

## 2024-01-24 DIAGNOSIS — Z13.89 SCREENING FOR BLOOD OR PROTEIN IN URINE: ICD-10-CM

## 2024-01-24 DIAGNOSIS — M32.9 SYSTEMIC LUPUS ERYTHEMATOSUS, MATERNAL, ANTEPARTUM: ICD-10-CM

## 2024-01-24 DIAGNOSIS — D68.61 ANTIPHOSPHOLIPID ANTIBODY SYNDROME COMPLICATING PREGNANCY: Primary | ICD-10-CM

## 2024-01-24 LAB
GLUCOSE UR STRIP-MCNC: NEGATIVE MG/DL
PROT UR STRIP-MCNC: ABNORMAL MG/DL

## 2024-01-24 PROCEDURE — 0502F SUBSEQUENT PRENATAL CARE: CPT | Performed by: OBSTETRICS & GYNECOLOGY

## 2024-01-24 NOTE — PROGRESS NOTES
Chief Complaint   Patient presents with    Routine Prenatal Visit     10w4d OB Check      Carina Galindo continues to have swelling and joint pain.  She was supposed to start on some prednisone from her rheumatologist but her pharmacy has not gotten it to her yet.  She continues to have some mild nausea, worse at night and in the morning.  Advised to  vitamin B6 and Unisom from the pharmacy downstairs.  Discussed risks of pregnancy with systemic lupus and antiphospholipid antibody syndrome.  Discussed risks of venous and arterial blood clotting.  She is tolerating her Lovenox well.  Discussed that we will need to increase this over time.  Discussed increased risks of miscarriage, stillbirth.  Also much greater risk of preeclampsia or growth restriction.  Her pregnancy will be monitored very closely with high risk and myself.   bpm  NIPT and carrier testing today    Return for two-three weeks ob follow up please.    Pauline Clements MD  1/24/2024  15:28 EST

## 2024-02-05 ENCOUNTER — ROUTINE PRENATAL (OUTPATIENT)
Dept: OBSTETRICS AND GYNECOLOGY | Age: 29
End: 2024-02-05
Payer: COMMERCIAL

## 2024-02-05 VITALS — WEIGHT: 211.4 LBS | SYSTOLIC BLOOD PRESSURE: 122 MMHG | DIASTOLIC BLOOD PRESSURE: 76 MMHG | BODY MASS INDEX: 36.29 KG/M2

## 2024-02-05 DIAGNOSIS — Z34.81 ENCOUNTER FOR SUPERVISION OF OTHER NORMAL PREGNANCY IN FIRST TRIMESTER: ICD-10-CM

## 2024-02-05 DIAGNOSIS — Z13.89 SCREENING FOR BLOOD OR PROTEIN IN URINE: Primary | ICD-10-CM

## 2024-02-05 LAB
BILIRUB BLD-MCNC: ABNORMAL MG/DL
GLUCOSE UR STRIP-MCNC: NEGATIVE MG/DL
KETONES UR QL: NEGATIVE
LEUKOCYTE EST, POC: ABNORMAL
NITRITE UR-MCNC: NEGATIVE MG/ML
PH UR: 6 [PH] (ref 5–8)
PROT UR STRIP-MCNC: ABNORMAL MG/DL
RBC # UR STRIP: NEGATIVE /UL
SP GR UR: 1.03 (ref 1–1.03)
UROBILINOGEN UR QL: NORMAL

## 2024-02-05 PROCEDURE — 0502F SUBSEQUENT PRENATAL CARE: CPT | Performed by: OBSTETRICS & GYNECOLOGY

## 2024-02-05 RX ORDER — CEPHALEXIN 750 MG/1
750 CAPSULE ORAL 3 TIMES DAILY
Qty: 21 CAPSULE | Refills: 0 | Status: SHIPPED | OUTPATIENT
Start: 2024-02-05 | End: 2024-02-12

## 2024-02-05 NOTE — PROGRESS NOTES
Chief Complaint   Patient presents with    Routine Prenatal Visit     12w2d OB Check. Pt c/o pressure when urinating, mild burning and discomfort      Carina Galindo is doing okay, but feels as though she has a UTI.  She completed the course of steroid from rheumatology but it did not help much, she still has DIP and foot pain, swelling.  No cramping or bleeding  Mild nausea     bpm on doppler today    Appears to have UTI, sent in macrobid  Pr:Cr due to protein on dip  Follow up with rheumatology    FU 2-3 wks    Pauline Clements MD  2/5/2024  12:54 EST

## 2024-02-06 LAB
CREAT UR-MCNC: 239.4 MG/DL
PROT UR-MCNC: 27.8 MG/DL
PROT/CREAT UR: 116.1 MG/G CREA (ref 0–200)

## 2024-02-07 LAB
BACTERIA UR CULT: NO GROWTH
BACTERIA UR CULT: NORMAL

## 2024-02-26 ENCOUNTER — ROUTINE PRENATAL (OUTPATIENT)
Dept: OBSTETRICS AND GYNECOLOGY | Age: 29
End: 2024-02-26
Payer: COMMERCIAL

## 2024-02-26 ENCOUNTER — TRANSCRIBE ORDERS (OUTPATIENT)
Dept: ULTRASOUND IMAGING | Facility: HOSPITAL | Age: 29
End: 2024-02-26
Payer: COMMERCIAL

## 2024-02-26 VITALS — SYSTOLIC BLOOD PRESSURE: 124 MMHG | BODY MASS INDEX: 36.29 KG/M2 | DIASTOLIC BLOOD PRESSURE: 72 MMHG | WEIGHT: 211.4 LBS

## 2024-02-26 DIAGNOSIS — M32.9 SYSTEMIC LUPUS ERYTHEMATOSUS, MATERNAL, ANTEPARTUM: ICD-10-CM

## 2024-02-26 DIAGNOSIS — D68.61 ANTIPHOSPHOLIPID ANTIBODY SYNDROME COMPLICATING PREGNANCY: ICD-10-CM

## 2024-02-26 DIAGNOSIS — M32.9 SYSTEMIC LUPUS ERYTHEMATOSUS, MATERNAL, ANTEPARTUM: Primary | ICD-10-CM

## 2024-02-26 DIAGNOSIS — Z34.82 ENCOUNTER FOR SUPERVISION OF OTHER NORMAL PREGNANCY IN SECOND TRIMESTER: Primary | ICD-10-CM

## 2024-02-26 DIAGNOSIS — O99.891 SYSTEMIC LUPUS ERYTHEMATOSUS, MATERNAL, ANTEPARTUM: Primary | ICD-10-CM

## 2024-02-26 DIAGNOSIS — O99.891 SYSTEMIC LUPUS ERYTHEMATOSUS, MATERNAL, ANTEPARTUM: ICD-10-CM

## 2024-02-26 DIAGNOSIS — O99.119 ANTIPHOSPHOLIPID ANTIBODY SYNDROME COMPLICATING PREGNANCY: ICD-10-CM

## 2024-02-26 DIAGNOSIS — Z13.89 SCREENING FOR BLOOD OR PROTEIN IN URINE: ICD-10-CM

## 2024-02-26 DIAGNOSIS — O21.9 NAUSEA/VOMITING IN PREGNANCY: ICD-10-CM

## 2024-02-26 LAB
GLUCOSE UR STRIP-MCNC: NEGATIVE MG/DL
PROT UR STRIP-MCNC: ABNORMAL MG/DL

## 2024-02-26 PROCEDURE — 0502F SUBSEQUENT PRENATAL CARE: CPT

## 2024-02-26 RX ORDER — HYDROXYCHLOROQUINE SULFATE 200 MG/1
TABLET, FILM COATED ORAL
COMMUNITY
Start: 2024-02-20

## 2024-02-26 RX ORDER — DOXYLAMINE SUCCINATE AND PYRIDOXINE HYDROCHLORIDE 20; 20 MG/1; MG/1
1 TABLET, EXTENDED RELEASE ORAL 2 TIMES DAILY
Qty: 60 TABLET | Refills: 0 | Status: SHIPPED | OUTPATIENT
Start: 2024-02-26

## 2024-02-26 NOTE — PROGRESS NOTES
Chief Complaint   Patient presents with    Routine Prenatal Visit     15w2d OB Check, pt has no complaints.        HPI: Carina Galindo, a 28 y.o.  at 15w2d, presents for OB follow-up.  Pt of Dr. Clements  She is doing well today.  Denies loss of fluid, vaginal bleeding, and contractions.    She had recent UTI, completed antibiotics and feel like symptoms have passed.  Continues feeling nauseous with some vomiting in the mornings, has tried changing her meals without relief.  Request Bonjesta refills   Rheumatology recently started her on Plaquenil, next f/u in April.  Continues taking Lovenox   MFM appointment this Wednesday   Gender reveal 3/9      Vitals:    24 1348   BP: 124/72   Weight: 95.9 kg (211 lb 6.4 oz)     Total weight gain for pregnancy:  -1.179 kg (-2 lb 9.6 oz)      A/P  1. Intrauterine pregnancy at 15w2d   2. Pregnancy Risk:  HIGH RISK        Diagnoses and all orders for this visit:    1. Encounter for supervision of other normal pregnancy in second trimester (Primary)  -     POC Urinalysis Dipstick    2. Screening for blood or protein in urine  -     POC Urinalysis Dipstick    3. Nausea/vomiting in pregnancy  -     doxylamine-pyridoxine ER (Bonjesta) 20-20 MG tablet controlled-release tablet; Take 1 tablet by mouth 2 (Two) Times a Day.  Dispense: 60 tablet; Refill: 0    4. Systemic lupus erythematosus- + anticardio AB, neg SS-A/SS-B    5. Antiphospholipid antibody syndrome complicating pregnancy- cardiolipin AB +        Pre-eclampsia symptoms were discussed and warnings were given.  -----------------------  PLAN:   Bonjesta refills sent to pharmacy   AFP next visit  Return in about 3 weeks (around 3/18/2024) for 2-3 weeks OB f/u [facility].      Karin Dee PA-C  2024 14:49 EST

## 2024-02-28 ENCOUNTER — OFFICE VISIT (OUTPATIENT)
Dept: OBSTETRICS AND GYNECOLOGY | Facility: CLINIC | Age: 29
End: 2024-02-28
Payer: COMMERCIAL

## 2024-02-28 ENCOUNTER — HOSPITAL ENCOUNTER (OUTPATIENT)
Dept: ULTRASOUND IMAGING | Facility: HOSPITAL | Age: 29
Discharge: HOME | End: 2024-02-28
Admitting: OBSTETRICS & GYNECOLOGY
Payer: COMMERCIAL

## 2024-02-28 VITALS
BODY MASS INDEX: 36.09 KG/M2 | WEIGHT: 211.4 LBS | HEIGHT: 64 IN | HEART RATE: 92 BPM | DIASTOLIC BLOOD PRESSURE: 53 MMHG | TEMPERATURE: 98.7 F | SYSTOLIC BLOOD PRESSURE: 110 MMHG

## 2024-02-28 DIAGNOSIS — D68.61 ANTIPHOSPHOLIPID ANTIBODY SYNDROME COMPLICATING PREGNANCY: ICD-10-CM

## 2024-02-28 DIAGNOSIS — M32.9 SYSTEMIC LUPUS ERYTHEMATOSUS, MATERNAL, ANTEPARTUM: ICD-10-CM

## 2024-02-28 DIAGNOSIS — O99.891 SYSTEMIC LUPUS ERYTHEMATOSUS, MATERNAL, ANTEPARTUM: ICD-10-CM

## 2024-02-28 DIAGNOSIS — O99.119 ANTIPHOSPHOLIPID ANTIBODY SYNDROME COMPLICATING PREGNANCY: ICD-10-CM

## 2024-02-28 DIAGNOSIS — O99.891 SYSTEMIC LUPUS ERYTHEMATOSUS, MATERNAL, ANTEPARTUM: Primary | ICD-10-CM

## 2024-02-28 DIAGNOSIS — M32.9 SYSTEMIC LUPUS ERYTHEMATOSUS, MATERNAL, ANTEPARTUM: Primary | ICD-10-CM

## 2024-02-28 PROCEDURE — 76805 OB US >/= 14 WKS SNGL FETUS: CPT

## 2024-02-28 NOTE — PROGRESS NOTES
Pt reports that she is doing well and denies vaginal bleeding, cramping, contractions or LOF at this time. Reports not feeling fetal movement at this point in pregnancy. Reviewed when to call OB office or present to L&D for evaluation with symptoms such as decreased fetal movement, vaginal bleeding, LOF or ctxs. Pt verbalized understanding. Denies visual changes or epigastric pain. Reports irregular HA and round ligament pain.Denies any additional complaints at time of appointment. Next OB appointment scheduled for 3/18.    Vitals:    02/28/24 0807   BP: 110/53   Pulse: 92   Temp: 98.7 °F (37.1 °C)

## 2024-02-28 NOTE — PROGRESS NOTES
MATERNAL FETAL MEDICINE Consult Note    Dear Dr Pauline Clements MD:    Thank you for your kind referral of Carina Galindo.  As you know, she is a 28 y.o.   at  15 4/7 weeks gestation (Estimated Date of Delivery: 24). This is a consult.      Her antepartum course is complicated by:  SLE  APLAS    Aneuploidy Screening: low risk    HPI: Today, she denies headache, blurry vision, RUQ pain. No vaginal bleeding, no contractions.     Review of History:  Past Medical History:   Diagnosis Date    Antiphospholipid syndrome     Anxiety     exercise induced asthma     GERD (gastroesophageal reflux disease)     Iron deficiency anemia     Knee meniscus pain, left     Liver cyst     BX AND FOUND TO BE NEGATIVE    Lupus     OCD (obsessive compulsive disorder)     Rheumatoid factor positive      Past Surgical History:   Procedure Laterality Date    ADENOIDECTOMY      CHOLECYSTECTOMY WITH INTRAOPERATIVE CHOLANGIOGRAM N/A 2016    Procedure: CHOLECYSTECTOMY LAPAROSCOPIC INTRAOPERATIVE CHOLANGIOGRAM;  Surgeon: Malathi Lozano MD;  Location: [facility] OR [facility] [place];  Service:     COLONOSCOPY  2010    ENDOSCOPY  2013    Gastritis     KNEE ARTHROSCOPY Left 3/28/2023    Procedure: Knee Arthroscopy with lateral release, meniscectomy and cyst excision;  Surgeon: Tapan Craig MD;  Location: [facility] OR [facility] [place];  Service: Orthopedics;  Laterality: Left;    NASAL SEPTUM SURGERY      SHOULDER SURGERY Right 2016    TONSILLECTOMY      x 2 due to regrowth    UPPER GASTROINTESTINAL ENDOSCOPY  2010    WISDOM TOOTH EXTRACTION         Social History     Socioeconomic History    Marital status:     Number of children: 0   Tobacco Use    Smoking status: Never     Passive exposure: Never    Smokeless tobacco: Never   Vaping Use    Vaping Use: Never used   Substance and Sexual Activity    Alcohol use: No    Drug use: Never    Sexual activity: Yes     Partners: Male     Birth control/protection: None     Family History   Problem  Relation Age of Onset    Diabetes Father     Gallbladder disease Mother 30    Rheum arthritis Mother     Hypertension Mother     Endometriosis Mother     Irritable bowel syndrome Mother     Pancreatitis Mother     Breast cancer Maternal Grandmother     Heart disease Maternal Grandfather     Breast cancer Maternal Aunt     Congenital heart disease Cousin     Elissa Hyperthermia Neg Hx     Deep vein thrombosis Neg Hx     Pulmonary embolism Neg Hx       Allergies   Allergen Reactions    Eggs Or Egg-Derived Products Swelling     Eyes swell shut, has some eggs cooked in food.    Other Other (See Comments)     FOOD ALLERGIES: see list : peanuts and ranch dressing      Shellfish-Derived Products Anaphylaxis    Nitrofurantoin Unknown (See Comments) and Rash     Unsure    Unsure    Peanut Butter Flavor [Flavoring Agent] Hives    Ciprofloxacin Rash    Erythromycin Rash     As a baby, no interaction since    Ibuprofen Rash     Has tolerated low dose in the past    Multihance [Gadobenate] Hives     Patient experienced one small hive on neck. Was instructed to be pre-medicated per radiologist prior to MRI's for future.     Sulfa Antibiotics Rash    Triamcinolone Rash      Current Outpatient Medications on File Prior to Visit   Medication Sig Dispense Refill    acetaminophen (TYLENOL) 325 MG tablet Take 1 tablet by mouth Every 6 (Six) Hours As Needed.      albuterol (VENTOLIN HFA) 108 (90 Base) MCG/ACT inhaler Inhale 2 puffs Every 4 (Four) Hours As Needed for Wheezing. 18 g 1    Alcohol Swabs 70 % pads Use 1 Swab Daily. 100 each 11    aspirin (ASPIR) 81 MG EC tablet Take one tablet by mouth daily until 36 weeks gestation. 90 tablet 3    diphenhydrAMINE (BENADRYL) 25 mg capsule Take 1 capsule by mouth Every 6 (Six) Hours As Needed for Itching.      doxylamine-pyridoxine ER (Bonjesta) 20-20 MG tablet controlled-release tablet Take 1 tablet by mouth 2 (Two) Times a Day. 60 tablet 0    Enoxaparin Sodium (LOVENOX) 40 MG/0.4ML solution  "prefilled syringe syringe Inject 0.4 mL under the skin into the appropriate area as directed Daily. 12 mL 11    hydroxychloroquine (PLAQUENIL) 200 MG tablet       Prenatal Vit-Fe Fumarate-FA (prenatal vitamin 27-0.8) 27-0.8 MG tablet tablet Take  by mouth Daily.      EPINEPHrine (EPIPEN) 0.3 MG/0.3ML solution auto-injector injection       sertraline (ZOLOFT) 50 MG tablet  (Patient not taking: Reported on 2024)       No current facility-administered medications on file prior to visit.        Past obstetric, gynecological, medical, surgical, family and social history reviewed.  Relevant lab work and imaging reviewed.    Review of systems  Constitutional:  denies fever, chills, malaise.   ENT/Mouth:  denies sore throat, tinnitus  Eyes: denies vision changes/pain  CV:  denies chest pain  Respiratory:  denies cough/SOB  GI:  denies N/V, diarrhea, abdominal pain.    :   denies dysuria  Skin:  denies lesions or pruritus   Neuro:  denies weakness, focal neurologic symptoms    Vitals:    24 0807   BP: 110/53   BP Location: Right arm   Patient Position: Sitting   Pulse: 92   Temp: 98.7 °F (37.1 °C)   TempSrc: Temporal   Weight: 95.9 kg (211 lb 6.4 oz)   Height: 162.6 cm (64\")       PHYSICAL EXAM   GENERAL: Not in acute distress, AAOx3, pleasant  CARDIO: regular rate and rhythm  PULM: symmetric chest rise, speaking in complete sentences without difficulty  NEURO: awake, alert and oriented to person, place, and time  ABDOMINAL: No fundal tenderness, no rebound or guarding, gravid  EXTREMITIES: no bilateral lower extremity edema/tenderness  SKIN: Warm, well-perfused      ULTRASOUND   Please view full ultrasound note on Imaging tab in View[employer].  Breech presentation.  Anterior placenta  MVP 3.1 cm, which is normal.    g (AC 85%)  Early limited anatomy appears normal.     ASSESSMENT/COUNSELIN y.o.   at  15 4/7 weeks gestation (Estimated Date of Delivery: 24).    -Pregnancy  [ X ] stable  [   ] " improving [  ] worsening    Diagnoses and all orders for this visit:    1. Systemic lupus erythematosus- + anticardio AB, neg SS-A/SS-B (Primary)    2. Antiphospholipid antibody syndrome complicating pregnancy- cardiolipin AB +         SLE:  Patient doesn't have an official dx of SLE, but began having joint symptoms with swelling right before she got pregnant.  Her mother has a diagnosis of RA.  She has seen rheumatology and met some but not all criteria for Lupus.  However, she is APLAS positive.  Given her symptoms/constellation of findings, she likely has Lupus or a similar autoimmune issue with similar risk factors during pregnancy, so we will treat her like lupus.  She was on a steroid taper and just started plaquenil to help with her symptoms.  Her flares involve her joints and she has never had cardiac, renal, or neuro manifestations.  Her baseline P/C is 116 and BL Cr is 0.61 prepregnancy.  Platelets 431 earlier this year.  She was negative for Ro/La antibodies earlier this year.  She is off steroids and plans to start plaquenil today.       I reviewed with the patient  the implications of Lupus and pregnancy. Pregnancy is not necessarily thought to increase flares in most patients, though some patients have increased flares in the postpartum period and flares can happen during pregnancy. Maternal complications from Lupus include increased risk of preeclampsia. Renal involvement pre-gestation is an important risk factor, as 2/3 of Lupus patients with renal disease develop pre-eclampsia versus <20% of those without renal damage. 25% will develop thrombocytopenia, but this is usually mild and asymptomatic. Due to ligament laxity during pregnancy, those patients with joint involvement may develop non-inflammatory effusions, although this is rare.  Lupus can also be associated growth restriction and fetal loss, although these issues are much more common in women with renal involvement--untreated antiphospholipid  syndrome also increases this but she is on lovenox and ASA.       I  also discussed briefly  lupus (NL). I explained it is a passively transferred autoimmune disease. It occurs in about 1 to 2 percent of babies born to mothers with autoimmune disease, primarily systemic lupus erythematosus (SLE) and Sjögren’s syndrome, and antibodies to SSA/Ro and/or SSB/La. She does not have these antibodies so her risk of this is very  low, but this usually presents in the  as rash, thrombocytopenia--It is usually mild and self-limited.       I recommend she continue following with her rheumatologist as they recommend and have complement levels and Anti-DS DNA levels each trimester. These will be helpful for comparison if she develops a flare or concern for preeclampsia during this pregnancy.  She will need serial growth exams and twice BPP starting by 32 weeks between me and her primary OB.      She is having some joint pain still--starting Plaquenil today but discussed if is not adequate can do steroids--discussed glycemic management and steroid induced gestational diabetes associated.  We will re-evaluate in 3-4 weeks once the plaquenil has time to work.      APLAS with no personal history of VTE  I discussed with the patient her diagnosis of APLAS, which is based on lupus and her positive lab tests.  We reviewed the risks of pregnancy with APLAS including but not limited to miscarriage, stillbirth, fetal growth restriction, preeclampsia, indicated  birth, and venous thromboembolic event.    We discussed decreasing the risk of these complications with LMWH and baby aspirin throughout pregnancy and for 4-6 weeks post partum.  We also recommend serial growth ultrasound and ANFS twice weekly beyond 32 weeks.     Summary of Plan  -Serial growth ultrasounds every 4 weeks (MFM)   -Anatomy MFM 4 weeks  -Starting at 32 weeks: twice Weekly fetal  surveillance until delivery--pt to see what day works for OB  and we will get her scheduled on a Tu/Fri or M/Th schedule alternating with MFM  -Continue ASA and Lovenox  -Pt to start plaquenil today.  Will re-evaluate in 3-4 weeks  -Follow with rheum  -Every trimester PIH labs and P/C recommended  -Delivery 38 weeks for APLAS, anticoagulation, lupus--unless indicated sooner    Follow-up: monthly    Thank you for the consult and opportunity to care for this patient.  Please feel free to reach out with any questions or concerns.      I spent 27 minutes caring for this patient on this date of service. This time includes time spent by me in the following activities: preparing for the visit, reviewing tests, obtaining and/or reviewing a separately obtained history, performing a medically appropriate examination and/or evaluation, counseling and educating the patient/family/caregiver and independently interpreting results and communicating that information with the patient/family/caregiver with greater than 50% spent in counseling and coordination of care.       I spent 4 minutes on the separately reported service of US imaging not included in the time used to support the E/M service also reported today.      Beverley Patino MD FACOG  Maternal Fetal Medicine-[facility]  Office: [phone]  [email or web address]

## 2024-02-28 NOTE — LETTER
2024     Pauline Clements MD  [address] [facility]  [address] [place], [place] [address]    Patient: Carina Galindo   YOB: 1995   Date of Visit: 2024       Dear Pauline Clements MD,    Thank you for referring Carina Galindo to me for evaluation. Below is a copy of my consult note.    If you have questions, please do not hesitate to call me. I look forward to following Carina along with you.         Sincerely,        Beverley Patino MD      MATERNAL FETAL MEDICINE Consult Note    Dear Dr Pauline Clements MD:    Thank you for your kind referral of Carina Galindo.  As you know, she is a 28 y.o.   at  15 4/7 weeks gestation (Estimated Date of Delivery: 24). This is a consult.      Her antepartum course is complicated by:  SLE  APLAS    Aneuploidy Screening: low risk    HPI: Today, she denies headache, blurry vision, RUQ pain. No vaginal bleeding, no contractions.     Review of History:  Past Medical History:   Diagnosis Date   • Antiphospholipid syndrome    • Anxiety    • exercise induced asthma    • GERD (gastroesophageal reflux disease)    • Iron deficiency anemia    • Knee meniscus pain, left    • Liver cyst     BX AND FOUND TO BE NEGATIVE   • Lupus    • OCD (obsessive compulsive disorder)    • Rheumatoid factor positive      Past Surgical History:   Procedure Laterality Date   • ADENOIDECTOMY     • CHOLECYSTECTOMY WITH INTRAOPERATIVE CHOLANGIOGRAM N/A 2016    Procedure: CHOLECYSTECTOMY LAPAROSCOPIC INTRAOPERATIVE CHOLANGIOGRAM;  Surgeon: Malathi Lozano MD;  Location: [facility] OR [facility];  Service:    • COLONOSCOPY     • ENDOSCOPY  2013    Gastritis    • KNEE ARTHROSCOPY Left 3/28/2023    Procedure: Knee Arthroscopy with lateral release, meniscectomy and cyst excision;  Surgeon: Tapan Craig MD;  Location: [facility] OR [facility];  Service: Orthopedics;  Laterality: Left;   • NASAL SEPTUM SURGERY     • SHOULDER SURGERY Right    • TONSILLECTOMY      x 2 due to  regrowth   • UPPER GASTROINTESTINAL ENDOSCOPY  2010   • WISDOM TOOTH EXTRACTION         Social History     Socioeconomic History   • Marital status:    • Number of children: 0   Tobacco Use   • Smoking status: Never     Passive exposure: Never   • Smokeless tobacco: Never   Vaping Use   • Vaping Use: Never used   Substance and Sexual Activity   • Alcohol use: No   • Drug use: Never   • Sexual activity: Yes     Partners: Male     Birth control/protection: None     Family History   Problem Relation Age of Onset   • Diabetes Father    • Gallbladder disease Mother 30   • Rheum arthritis Mother    • Hypertension Mother    • Endometriosis Mother    • Irritable bowel syndrome Mother    • Pancreatitis Mother    • Breast cancer Maternal Grandmother    • Heart disease Maternal Grandfather    • Breast cancer Maternal Aunt    • Congenital heart disease Cousin    • Malig Hyperthermia Neg Hx    • Deep vein thrombosis Neg Hx    • Pulmonary embolism Neg Hx       Allergies   Allergen Reactions   • Eggs Or Egg-Derived Products Swelling     Eyes swell shut, has some eggs cooked in food.   • Other Other (See Comments)     FOOD ALLERGIES: see list : peanuts and ranch dressing     • Shellfish-Derived Products Anaphylaxis   • Nitrofurantoin Unknown (See Comments) and Rash     Unsure    Unsure   • Peanut Butter Flavor [Flavoring Agent] Hives   • Ciprofloxacin Rash   • Erythromycin Rash     As a baby, no interaction since   • Ibuprofen Rash     Has tolerated low dose in the past   • Multihance [Gadobenate] Hives     Patient experienced one small hive on neck. Was instructed to be pre-medicated per radiologist prior to MRI's for future.    • Sulfa Antibiotics Rash   • Triamcinolone Rash      Current Outpatient Medications on File Prior to Visit   Medication Sig Dispense Refill   • acetaminophen (TYLENOL) 325 MG tablet Take 1 tablet by mouth Every 6 (Six) Hours As Needed.     • albuterol (VENTOLIN HFA) 108 (90 Base) MCG/ACT inhaler  "Inhale 2 puffs Every 4 (Four) Hours As Needed for Wheezing. 18 g 1   • Alcohol Swabs 70 % pads Use 1 Swab Daily. 100 each 11   • aspirin (ASPIR) 81 MG EC tablet Take one tablet by mouth daily until 36 weeks gestation. 90 tablet 3   • diphenhydrAMINE (BENADRYL) 25 mg capsule Take 1 capsule by mouth Every 6 (Six) Hours As Needed for Itching.     • doxylamine-pyridoxine ER (Bonjesta) 20-20 MG tablet controlled-release tablet Take 1 tablet by mouth 2 (Two) Times a Day. 60 tablet 0   • Enoxaparin Sodium (LOVENOX) 40 MG/0.4ML solution prefilled syringe syringe Inject 0.4 mL under the skin into the appropriate area as directed Daily. 12 mL 11   • hydroxychloroquine (PLAQUENIL) 200 MG tablet      • Prenatal Vit-Fe Fumarate-FA (prenatal vitamin 27-0.8) 27-0.8 MG tablet tablet Take  by mouth Daily.     • EPINEPHrine (EPIPEN) 0.3 MG/0.3ML solution auto-injector injection      • sertraline (ZOLOFT) 50 MG tablet  (Patient not taking: Reported on 2/28/2024)       No current facility-administered medications on file prior to visit.        Past obstetric, gynecological, medical, surgical, family and social history reviewed.  Relevant lab work and imaging reviewed.    Review of systems  Constitutional:  denies fever, chills, malaise.   ENT/Mouth:  denies sore throat, tinnitus  Eyes: denies vision changes/pain  CV:  denies chest pain  Respiratory:  denies cough/SOB  GI:  denies N/V, diarrhea, abdominal pain.    :   denies dysuria  Skin:  denies lesions or pruritus   Neuro:  denies weakness, focal neurologic symptoms    Vitals:    02/28/24 0807   BP: 110/53   BP Location: Right arm   Patient Position: Sitting   Pulse: 92   Temp: 98.7 °F (37.1 °C)   TempSrc: Temporal   Weight: 95.9 kg (211 lb 6.4 oz)   Height: 162.6 cm (64\")       PHYSICAL EXAM   GENERAL: Not in acute distress, AAOx3, pleasant  CARDIO: regular rate and rhythm  PULM: symmetric chest rise, speaking in complete sentences without difficulty  NEURO: awake, alert and " oriented to person, place, and time  ABDOMINAL: No fundal tenderness, no rebound or guarding, gravid  EXTREMITIES: no bilateral lower extremity edema/tenderness  SKIN: Warm, well-perfused      ULTRASOUND   Please view full ultrasound note on Imaging tab in ViewPoint.  Breech presentation.  Anterior placenta  MVP 3.1 cm, which is normal.    g (AC 85%)  Early limited anatomy appears normal.     ASSESSMENT/COUNSELIN y.o.   at  15 4/7 weeks gestation (Estimated Date of Delivery: 24).    -Pregnancy  [ X ] stable  [   ] improving [  ] worsening    Diagnoses and all orders for this visit:    1. Systemic lupus erythematosus- + anticardio AB, neg SS-A/SS-B (Primary)    2. Antiphospholipid antibody syndrome complicating pregnancy- cardiolipin AB +         SLE:  Patient doesn't have an official dx of SLE, but began having joint symptoms with swelling right before she got pregnant.  Her mother has a diagnosis of RA.  She has seen rheumatology and met some but not all criteria for Lupus.  However, she is APLAS positive.  Given her symptoms/constellation of findings, she likely has Lupus or a similar autoimmune issue with similar risk factors during pregnancy, so we will treat her like lupus.  She was on a steroid taper and just started plaquenil to help with her symptoms.  Her flares involve her joints and she has never had cardiac, renal, or neuro manifestations.  Her baseline P/C is 116 and BL Cr is 0.61 prepregnancy.  Platelets 431 earlier this year.  She was negative for Ro/La antibodies earlier this year.  She is off steroids and plans to start plaquenil today.       I reviewed with the patient  the implications of Lupus and pregnancy. Pregnancy is not necessarily thought to increase flares in most patients, though some patients have increased flares in the postpartum period and flares can happen during pregnancy. Maternal complications from Lupus include increased risk of preeclampsia. Renal  involvement pre-gestation is an important risk factor, as 2/3 of Lupus patients with renal disease develop pre-eclampsia versus <20% of those without renal damage. 25% will develop thrombocytopenia, but this is usually mild and asymptomatic. Due to ligament laxity during pregnancy, those patients with joint involvement may develop non-inflammatory effusions, although this is rare.  Lupus can also be associated growth restriction and fetal loss, although these issues are much more common in women with renal involvement--untreated antiphospholipid syndrome also increases this but she is on lovenox and ASA.       I  also discussed briefly  lupus (NL). I explained it is a passively transferred autoimmune disease. It occurs in about 1 to 2 percent of babies born to mothers with autoimmune disease, primarily systemic lupus erythematosus (SLE) and Sjögren’s syndrome, and antibodies to SSA/Ro and/or SSB/La. She does not have these antibodies so her risk of this is very  low, but this usually presents in the  as rash, thrombocytopenia--It is usually mild and self-limited.       I recommend she continue following with her rheumatologist as they recommend and have complement levels and Anti-DS DNA levels each trimester. These will be helpful for comparison if she develops a flare or concern for preeclampsia during this pregnancy.  She will need serial growth exams and twice BPP starting by 32 weeks between me and her primary OB.      She is having some joint pain still--starting Plaquenil today but discussed if is not adequate can do steroids--discussed glycemic management and steroid induced gestational diabetes associated.  We will re-evaluate in 3-4 weeks once the plaquenil has time to work.      APLAS with no personal history of VTE  I discussed with the patient her diagnosis of APLAS, which is based on lupus and her positive lab tests.  We reviewed the risks of pregnancy with APLAS including but not limited  to miscarriage, stillbirth, fetal growth restriction, preeclampsia, indicated  birth, and venous thromboembolic event.    We discussed decreasing the risk of these complications with LMWH and baby aspirin throughout pregnancy and for 4-6 weeks post partum.  We also recommend serial growth ultrasound and ANFS twice weekly beyond 32 weeks.     Summary of Plan  -Serial growth ultrasounds every 4 weeks (MFM)   -Anatomy MFM 4 weeks  -Starting at 32 weeks: twice Weekly fetal  surveillance until delivery--pt to see what day works for OB and we will get her scheduled on a /Fri or  schedule alternating with MFM  -Continue ASA and Lovenox  -Pt to start plaquenil today.  Will re-evaluate in 3-4 weeks  -Follow with rheum  -Every trimester PIH labs and P/C recommended  -Delivery 38 weeks for APLAS, anticoagulation, lupus--unless indicated sooner    Follow-up: monthly    Thank you for the consult and opportunity to care for this patient.  Please feel free to reach out with any questions or concerns.      I spent 27 minutes caring for this patient on this date of service. This time includes time spent by me in the following activities: preparing for the visit, reviewing tests, obtaining and/or reviewing a separately obtained history, performing a medically appropriate examination and/or evaluation, counseling and educating the patient/family/caregiver and independently interpreting results and communicating that information with the patient/family/caregiver with greater than 50% spent in counseling and coordination of care.       I spent 4 minutes on the separately reported service of US imaging not included in the time used to support the E/M service also reported today.      Beverley Patino MD [facility]  Maternal Fetal Medicine-[facility]  Office: [phone]  [email or web address]

## 2024-03-06 NOTE — TELEPHONE ENCOUNTER
----- Message from ZACH Herrera sent at 2/12/2017  9:40 AM EST -----  Normal lab results. Thyroid screening normal   09:18

## 2024-03-18 ENCOUNTER — ROUTINE PRENATAL (OUTPATIENT)
Dept: OBSTETRICS AND GYNECOLOGY | Age: 29
End: 2024-03-18
Payer: COMMERCIAL

## 2024-03-18 VITALS — SYSTOLIC BLOOD PRESSURE: 102 MMHG | DIASTOLIC BLOOD PRESSURE: 60 MMHG | BODY MASS INDEX: 35.87 KG/M2 | WEIGHT: 209 LBS

## 2024-03-18 DIAGNOSIS — Z13.89 SCREENING FOR BLOOD OR PROTEIN IN URINE: ICD-10-CM

## 2024-03-18 DIAGNOSIS — O99.891 SYSTEMIC LUPUS ERYTHEMATOSUS, MATERNAL, ANTEPARTUM: ICD-10-CM

## 2024-03-18 DIAGNOSIS — O99.119 ANTIPHOSPHOLIPID ANTIBODY SYNDROME COMPLICATING PREGNANCY: ICD-10-CM

## 2024-03-18 DIAGNOSIS — D68.61 ANTIPHOSPHOLIPID ANTIBODY SYNDROME COMPLICATING PREGNANCY: ICD-10-CM

## 2024-03-18 DIAGNOSIS — Z34.02 ENCOUNTER FOR PRENATAL CARE IN SECOND TRIMESTER OF FIRST PREGNANCY: Primary | ICD-10-CM

## 2024-03-18 DIAGNOSIS — M32.9 SYSTEMIC LUPUS ERYTHEMATOSUS, MATERNAL, ANTEPARTUM: ICD-10-CM

## 2024-03-18 PROBLEM — R63.5 WEIGHT GAIN, ABNORMAL: Status: RESOLVED | Noted: 2017-02-10 | Resolved: 2024-03-18

## 2024-03-18 PROBLEM — E66.813 CLASS 3 SEVERE OBESITY DUE TO EXCESS CALORIES WITHOUT SERIOUS COMORBIDITY IN ADULT: Status: RESOLVED | Noted: 2018-10-01 | Resolved: 2024-03-18

## 2024-03-18 PROBLEM — S96.911A SPRAIN AND STRAIN OF RIGHT ANKLE: Status: RESOLVED | Noted: 2019-02-11 | Resolved: 2024-03-18

## 2024-03-18 PROBLEM — Z34.90 SUPERVISION OF NORMAL PREGNANCY: Status: RESOLVED | Noted: 2024-01-09 | Resolved: 2024-03-18

## 2024-03-18 PROBLEM — S93.401A SPRAIN AND STRAIN OF RIGHT ANKLE: Status: RESOLVED | Noted: 2019-02-11 | Resolved: 2024-03-18

## 2024-03-18 PROBLEM — E66.01 CLASS 3 SEVERE OBESITY DUE TO EXCESS CALORIES WITHOUT SERIOUS COMORBIDITY IN ADULT: Status: RESOLVED | Noted: 2018-10-01 | Resolved: 2024-03-18

## 2024-03-18 PROBLEM — O99.210 OBESITY IN PREGNANCY, ANTEPARTUM: Status: ACTIVE | Noted: 2024-03-18

## 2024-03-18 PROBLEM — M25.562 LEFT KNEE PAIN: Status: RESOLVED | Noted: 2023-02-20 | Resolved: 2024-03-18

## 2024-03-18 LAB
BILIRUB BLD-MCNC: ABNORMAL MG/DL
GLUCOSE UR STRIP-MCNC: NEGATIVE MG/DL
KETONES UR QL: ABNORMAL
LEUKOCYTE EST, POC: ABNORMAL
NITRITE UR-MCNC: NEGATIVE MG/ML
PH UR: 6 [PH] (ref 5–8)
PROT UR STRIP-MCNC: ABNORMAL MG/DL
RBC # UR STRIP: NEGATIVE /UL
SP GR UR: 1.03 (ref 1–1.03)
UROBILINOGEN UR QL: NORMAL

## 2024-03-18 PROCEDURE — 0502F SUBSEQUENT PRENATAL CARE: CPT | Performed by: OBSTETRICS & GYNECOLOGY

## 2024-03-18 NOTE — PROGRESS NOTES
Chief Complaint   Patient presents with    Routine Prenatal Visit     Cc: new ob today - dr rebeca delvalle , Seen MFM on  with [facility] ,  c/o having some abdominal cramping today no VB , had flu last week  but today she is  feeling fine . Pt is on lovenox daily 40 mg        HPI: 28 y.o.  at 18w2d presents for prenatal care -new patient to me today  Vitals:    24 1137   BP: 102/60   Weight: 94.8 kg (209 lb)     Total weight gain for pregnancy:  -2.268 kg (-5 lb)    Review of systems:   Gen: negative  CV:     negative  GI: negative  :   negative  MS:    negative  Neuro: negative  Pul: negative    A/P  1. Intrauterine pregnancy at 18w2d   2. Pregnancy Risk:  HIGH RISK        Diagnoses and all orders for this visit:    1. Encounter for prenatal care in second trimester of first pregnancy (Primary)    2. Screening for blood or protein in urine  -     POC Urinalysis Dipstick    3. Antiphospholipid antibody syndrome complicating pregnancy- cardiolipin AB +    4. Systemic lupus erythematosus- + anticardio AB, neg SS-A/SS-B        Nutrition and weight gain were addressed.  -----------------------  PLAN:   Return in about 30 days (around 2024), or ob check.  High risk pregnancy-has anatomy ultrasound scheduled with [facility]  Continue Plaquenil Lovenox and aspirin  NIPT low risk  Obesity-weight loss this pregnancy  Tammy Hollins MD  3/18/2024 13:28 EDT

## 2024-03-27 ENCOUNTER — TRANSCRIBE ORDERS (OUTPATIENT)
Dept: ULTRASOUND IMAGING | Facility: HOSPITAL | Age: 29
End: 2024-03-27
Payer: COMMERCIAL

## 2024-03-27 DIAGNOSIS — M32.9 SYSTEMIC LUPUS ERYTHEMATOSUS, MATERNAL, ANTEPARTUM: Primary | ICD-10-CM

## 2024-03-27 DIAGNOSIS — D68.61 ANTIPHOSPHOLIPID ANTIBODY SYNDROME COMPLICATING PREGNANCY: ICD-10-CM

## 2024-03-27 DIAGNOSIS — O99.891 SYSTEMIC LUPUS ERYTHEMATOSUS, MATERNAL, ANTEPARTUM: Primary | ICD-10-CM

## 2024-03-27 DIAGNOSIS — O99.119 ANTIPHOSPHOLIPID ANTIBODY SYNDROME COMPLICATING PREGNANCY: ICD-10-CM

## 2024-03-29 ENCOUNTER — LAB (OUTPATIENT)
Dept: LAB | Facility: HOSPITAL | Age: 29
End: 2024-03-29
Payer: COMMERCIAL

## 2024-03-29 ENCOUNTER — HOSPITAL ENCOUNTER (OUTPATIENT)
Dept: ULTRASOUND IMAGING | Facility: HOSPITAL | Age: 29
Discharge: HOME | End: 2024-03-29
Payer: COMMERCIAL

## 2024-03-29 ENCOUNTER — OFFICE VISIT (OUTPATIENT)
Dept: OBSTETRICS AND GYNECOLOGY | Facility: CLINIC | Age: 29
End: 2024-03-29
Payer: COMMERCIAL

## 2024-03-29 VITALS
HEART RATE: 92 BPM | SYSTOLIC BLOOD PRESSURE: 105 MMHG | WEIGHT: 213.8 LBS | DIASTOLIC BLOOD PRESSURE: 66 MMHG | TEMPERATURE: 98.2 F | BODY MASS INDEX: 36.7 KG/M2

## 2024-03-29 DIAGNOSIS — M32.9 SYSTEMIC LUPUS ERYTHEMATOSUS, MATERNAL, ANTEPARTUM: Primary | ICD-10-CM

## 2024-03-29 DIAGNOSIS — O99.119 ANTIPHOSPHOLIPID ANTIBODY SYNDROME COMPLICATING PREGNANCY: ICD-10-CM

## 2024-03-29 DIAGNOSIS — O99.891 SYSTEMIC LUPUS ERYTHEMATOSUS, MATERNAL, ANTEPARTUM: ICD-10-CM

## 2024-03-29 DIAGNOSIS — O99.891 SYSTEMIC LUPUS ERYTHEMATOSUS, MATERNAL, ANTEPARTUM: Primary | ICD-10-CM

## 2024-03-29 DIAGNOSIS — M32.9 SYSTEMIC LUPUS ERYTHEMATOSUS, MATERNAL, ANTEPARTUM: ICD-10-CM

## 2024-03-29 DIAGNOSIS — D68.61 ANTIPHOSPHOLIPID ANTIBODY SYNDROME COMPLICATING PREGNANCY: ICD-10-CM

## 2024-03-29 LAB
ALBUMIN SERPL-MCNC: 4 G/DL (ref 3.5–5.2)
ALBUMIN/GLOB SERPL: 1.2 G/DL
ALP SERPL-CCNC: 122 U/L (ref 39–117)
ALT SERPL W P-5'-P-CCNC: 13 U/L (ref 1–33)
ANION GAP SERPL CALCULATED.3IONS-SCNC: 12 MMOL/L (ref 5–15)
AST SERPL-CCNC: 12 U/L (ref 1–32)
BASOPHILS # BLD AUTO: 0.05 10*3/MM3 (ref 0–0.2)
BASOPHILS NFR BLD AUTO: 0.4 % (ref 0–1.5)
BILE AC SERPL-SCNC: 10 UMOL/L (ref 0–10)
BILIRUB SERPL-MCNC: 0.3 MG/DL (ref 0–1.2)
BUN SERPL-MCNC: 6 MG/DL (ref 6–20)
BUN/CREAT SERPL: 10.2 (ref 7–25)
C3 SERPL-MCNC: 127 MG/DL (ref 82–167)
C4 SERPL-MCNC: 17 MG/DL (ref 14–44)
CALCIUM SPEC-SCNC: 9.4 MG/DL (ref 8.6–10.5)
CHLORIDE SERPL-SCNC: 107 MMOL/L (ref 98–107)
CO2 SERPL-SCNC: 20 MMOL/L (ref 22–29)
CREAT SERPL-MCNC: 0.59 MG/DL (ref 0.57–1)
CREAT UR-MCNC: 46.9 MG/DL
DEPRECATED RDW RBC AUTO: 44.9 FL (ref 37–54)
EGFRCR SERPLBLD CKD-EPI 2021: 124.5 ML/MIN/1.73
EOSINOPHIL # BLD AUTO: 0.27 10*3/MM3 (ref 0–0.4)
EOSINOPHIL NFR BLD AUTO: 2.2 % (ref 0.3–6.2)
ERYTHROCYTE [DISTWIDTH] IN BLOOD BY AUTOMATED COUNT: 14.1 % (ref 12.3–15.4)
GLOBULIN UR ELPH-MCNC: 3.4 GM/DL
GLUCOSE SERPL-MCNC: 80 MG/DL (ref 65–99)
HCT VFR BLD AUTO: 36.4 % (ref 34–46.6)
HGB BLD-MCNC: 12.3 G/DL (ref 12–15.9)
IMM GRANULOCYTES # BLD AUTO: 0.2 10*3/MM3 (ref 0–0.05)
IMM GRANULOCYTES NFR BLD AUTO: 1.7 % (ref 0–0.5)
LYMPHOCYTES # BLD AUTO: 2.1 10*3/MM3 (ref 0.7–3.1)
LYMPHOCYTES NFR BLD AUTO: 17.5 % (ref 19.6–45.3)
MCH RBC QN AUTO: 29.9 PG (ref 26.6–33)
MCHC RBC AUTO-ENTMCNC: 33.8 G/DL (ref 31.5–35.7)
MCV RBC AUTO: 88.3 FL (ref 79–97)
MONOCYTES # BLD AUTO: 0.67 10*3/MM3 (ref 0.1–0.9)
MONOCYTES NFR BLD AUTO: 5.6 % (ref 5–12)
NEUTROPHILS NFR BLD AUTO: 72.6 % (ref 42.7–76)
NEUTROPHILS NFR BLD AUTO: 8.72 10*3/MM3 (ref 1.7–7)
NRBC BLD AUTO-RTO: 0 /100 WBC (ref 0–0.2)
PLATELET # BLD AUTO: 392 10*3/MM3 (ref 140–450)
PMV BLD AUTO: 9.4 FL (ref 6–12)
POTASSIUM SERPL-SCNC: 3.8 MMOL/L (ref 3.5–5.2)
PROT ?TM UR-MCNC: 5.4 MG/DL
PROT SERPL-MCNC: 7.4 G/DL (ref 6–8.5)
PROT/CREAT UR: 115.1 MG/G CREA (ref 0–200)
RBC # BLD AUTO: 4.12 10*6/MM3 (ref 3.77–5.28)
SODIUM SERPL-SCNC: 139 MMOL/L (ref 136–145)
WBC NRBC COR # BLD AUTO: 12.01 10*3/MM3 (ref 3.4–10.8)

## 2024-03-29 PROCEDURE — 84156 ASSAY OF PROTEIN URINE: CPT

## 2024-03-29 PROCEDURE — 85025 COMPLETE CBC W/AUTO DIFF WBC: CPT

## 2024-03-29 PROCEDURE — 86160 COMPLEMENT ANTIGEN: CPT | Performed by: OBSTETRICS & GYNECOLOGY

## 2024-03-29 PROCEDURE — 36415 COLL VENOUS BLD VENIPUNCTURE: CPT | Performed by: OBSTETRICS & GYNECOLOGY

## 2024-03-29 PROCEDURE — 80053 COMPREHEN METABOLIC PANEL: CPT

## 2024-03-29 PROCEDURE — 82570 ASSAY OF URINE CREATININE: CPT

## 2024-03-29 PROCEDURE — 86225 DNA ANTIBODY NATIVE: CPT

## 2024-03-29 PROCEDURE — 82239 BILE ACIDS TOTAL: CPT | Performed by: OBSTETRICS & GYNECOLOGY

## 2024-03-29 PROCEDURE — 76811 OB US DETAILED SNGL FETUS: CPT

## 2024-03-29 NOTE — PROGRESS NOTES
MATERNAL FETAL MEDICINE Consult Note    Dear Dr Pauline Clements MD:    Thank you for your kind referral of Carina Galindo.  As you know, she is a 28 y.o.   at  19 6/7 weeks gestation (Estimated Date of Delivery: 24). This is a consult.      Her antepartum course is complicated by:  SLE  APLAS    Aneuploidy Screening: low risk    HPI: Today, she denies headache, blurry vision, RUQ pain. No vaginal bleeding, no contractions.     Review of History:  Past Medical History:   Diagnosis Date    Antiphospholipid syndrome     Anxiety     exercise induced asthma     GERD (gastroesophageal reflux disease)     Iron deficiency anemia     Knee meniscus pain, left     Liver cyst     BX AND FOUND TO BE NEGATIVE    Lupus     OCD (obsessive compulsive disorder)     Rheumatoid factor positive      Past Surgical History:   Procedure Laterality Date    ADENOIDECTOMY      CHOLECYSTECTOMY WITH INTRAOPERATIVE CHOLANGIOGRAM N/A 2016    Procedure: CHOLECYSTECTOMY LAPAROSCOPIC INTRAOPERATIVE CHOLANGIOGRAM;  Surgeon: Malathi Lozano MD;  [place] [facility] [place] [facility]  Service:     COLONOSCOPY  2010    ENDOSCOPY  2013    Gastritis     KNEE ARTHROSCOPY Left 3/28/2023    Procedure: Knee Arthroscopy with lateral release, meniscectomy and cyst excision;  Surgeon: Tapan Craig MD;  [place] [facility] [place] [facility]  Service: Orthopedics;  Laterality: Left;    NASAL SEPTUM SURGERY      SHOULDER SURGERY Right 2016    TONSILLECTOMY      x 2 due to regrowth    UPPER GASTROINTESTINAL ENDOSCOPY  2010    WISDOM TOOTH EXTRACTION         Social History     Socioeconomic History    Marital status:     Number of children: 0   Tobacco Use    Smoking status: Never     Passive exposure: Never    Smokeless tobacco: Never   Vaping Use    Vaping status: Never Used   Substance and Sexual Activity    Alcohol use: No    Drug use: Never    Sexual activity: Yes     Partners: Male     Birth control/protection: None     Family History   Problem  Relation Age of Onset    Diabetes Father     Gallbladder disease Mother 30    Rheum arthritis Mother     Hypertension Mother     Endometriosis Mother     Irritable bowel syndrome Mother     Pancreatitis Mother     Breast cancer Maternal Grandmother     Heart disease Maternal Grandfather     Breast cancer Maternal Aunt     Congenital heart disease Cousin     Elissa Hyperthermia Neg Hx     Deep vein thrombosis Neg Hx     Pulmonary embolism Neg Hx       Allergies   Allergen Reactions    Egg-Derived Products Swelling     Eyes swell shut, has some eggs cooked in food.    Other Other (See Comments)     FOOD ALLERGIES: see list : peanuts and ranch dressing      Shellfish-Derived Products Anaphylaxis    Nitrofurantoin Unknown (See Comments) and Rash     Unsure    Unsure    Peanut Butter Flavor [Flavoring Agent] Hives    Ciprofloxacin Rash    Erythromycin Rash     As a baby, no interaction since    Ibuprofen Rash     Has tolerated low dose in the past    Multihance [Gadobenate] Hives     Patient experienced one small hive on neck. Was instructed to be pre-medicated per radiologist prior to MRI's for future.     Sulfa Antibiotics Rash    Triamcinolone Rash      Current Outpatient Medications on File Prior to Visit   Medication Sig Dispense Refill    acetaminophen (TYLENOL) 325 MG tablet Take 1 tablet by mouth Every 6 (Six) Hours As Needed.      albuterol (VENTOLIN HFA) 108 (90 Base) MCG/ACT inhaler Inhale 2 puffs Every 4 (Four) Hours As Needed for Wheezing. 18 g 1    aspirin (ASPIR) 81 MG EC tablet Take one tablet by mouth daily until 36 weeks gestation. 90 tablet 3    diphenhydrAMINE (BENADRYL) 25 mg capsule Take 1 capsule by mouth Every 6 (Six) Hours As Needed for Itching.      doxylamine-pyridoxine ER (Bonjesta) 20-20 MG tablet controlled-release tablet Take 1 tablet by mouth 2 (Two) Times a Day. 60 tablet 0    Enoxaparin Sodium (LOVENOX) 40 MG/0.4ML solution prefilled syringe syringe Inject 0.4 mL under the skin into the  appropriate area as directed Daily. 12 mL 11    hydroxychloroquine (PLAQUENIL) 200 MG tablet Take 1 tablet by mouth Daily.      Prenatal Vit-Fe Fumarate-FA (prenatal vitamin 27-0.8) 27-0.8 MG tablet tablet Take  by mouth Daily.      sertraline (ZOLOFT) 50 MG tablet       Alcohol Swabs 70 % pads Use 1 Swab Daily. 100 each 11    EPINEPHrine (EPIPEN) 0.3 MG/0.3ML solution auto-injector injection        No current facility-administered medications on file prior to visit.        Past obstetric, gynecological, medical, surgical, family and social history reviewed.  Relevant lab work and imaging reviewed.    Review of systems  Constitutional:  denies fever, chills, malaise.   ENT/Mouth:  denies sore throat, tinnitus  Eyes: denies vision changes/pain  CV:  denies chest pain  Respiratory:  denies cough/SOB  GI:  denies N/V, diarrhea, abdominal pain.    :   denies dysuria  Skin:  denies lesions or pruritus   Neuro:  denies weakness, focal neurologic symptoms    Vitals:    24 1519   BP: 105/66   BP Location: Right arm   Patient Position: Sitting   Pulse: 92   Temp: 98.2 °F (36.8 °C)   TempSrc: Temporal   Weight: 97 kg (213 lb 12.8 oz)       PHYSICAL EXAM   GENERAL: Not in acute distress, AAOx3, pleasant  CARDIO: regular rate and rhythm  PULM: symmetric chest rise, speaking in complete sentences without difficulty  NEURO: awake, alert and oriented to person, place, and time  ABDOMINAL: No fundal tenderness, no rebound or guarding, gravid  EXTREMITIES: no bilateral lower extremity edema/tenderness  SKIN: Warm, well-perfused      ULTRASOUND   Please view full ultrasound note on Imaging tab in ViewPoint.  Breech presentation.  Anterior placenta.  MVP 3.1 cm, which is normal.    g (AC 85%)  Anatomy appears normal.    Transabdominal CL >3.5 cm, which is normal.      ASSESSMENT/COUNSELIN y.o.   at  19 6/7 weeks gestation (Estimated Date of Delivery: 24).    -Pregnancy  [ X ] stable  [   ] improving [   ] worsening    Diagnoses and all orders for this visit:    1. Systemic lupus erythematosus- + anticardio AB, neg SS-A/SS-B (Primary)    2. Antiphospholipid antibody syndrome complicating pregnancy  -     CBC & Differential; Future  -     Protein / Creatinine Ratio, Urine - Urine, Clean Catch; Future  -     Comprehensive Metabolic Panel; Future  -     Anti-DNA Antibody, Double-stranded; Future  -     Bile Acids, Total  -     C4+C3           SLE:  Previously counseled.  Patient doesn't have an official dx of SLE, but began having joint symptoms with swelling right before she got pregnant.  Her mother has a diagnosis of RA.  She has seen rheumatology and met some but not all criteria for Lupus.  However, she is APLAS positive.  Given her symptoms/constellation of findings, she likely has Lupus or a similar autoimmune issue with similar risk factors during pregnancy, so we will treat her like lupus.  She is on plaquenil and doing well on 200 a day.  Her flares involve her joints and she has never had cardiac, renal, or neuro manifestations.  Her baseline P/C is 116 and BL Cr is 0.61 prepregnancy.  Platelets 431 earlier this year.  She was negative for Ro/La antibodies earlier this year.        I recommend she continue following with her rheumatologist as they recommend and have complement levels and Anti-DS DNA levels each trimester. These will be helpful for comparison if she develops a flare or concern for preeclampsia during this pregnancy.  She will need serial growth exams and twice BPP starting by 32 weeks between me and her primary OB.      She had some increased urine protein at last OB visit--we will get PI labs today for surveillance in the 2nd trimester.  Discussed this will be for surveillance--she does not have preE at this point, but if she has protein, we need to know to help with diagnosis later if it comes up.     Itching:  Pt itching has a rash on hands--she sees rheum in a couple weeks and will  discuss.  Likely not cholestasis, but she does also endorse some palm itching so will get bile acids to be safe. Recommended alternating hydrocortisone and benadryl cream.       APLAS with no personal history of VTE  I discussed with the patient her diagnosis of APLAS, which is based on lupus and her positive lab tests.  We reviewed the risks of pregnancy with APLAS including but not limited to miscarriage, stillbirth, fetal growth restriction, preeclampsia, indicated  birth, and venous thromboembolic event.    We discussed decreasing the risk of these complications with LMWH and baby aspirin throughout pregnancy and for 4-6 weeks post partum.  We also recommend serial growth ultrasound and ANFS twice weekly beyond 32 weeks.     Summary of Plan  -Serial growth ultrasounds every 4 weeks (MFM)   -Starting at 32 weeks: twice Weekly fetal  surveillance until delivery--pt to see what day works for OB and we will get her scheduled on a /Fri or / schedule alternating with MFM  -Continue ASA and Lovenox  -Continue plaquenil and following with rheum  -Bile acids, PIH labs, urine protein, DS DNA, complement today.  -Every trimester PIH labs and P/C recommended  -Delivery 38 weeks for APLAS, anticoagulation, lupus--unless indicated sooner    Follow-up: monthly    Thank you for the consult and opportunity to care for this patient.  Please feel free to reach out with any questions or concerns.      I spent 15 minutes caring for this patient on this date of service. This time includes time spent by me in the following activities: preparing for the visit, reviewing tests, obtaining and/or reviewing a separately obtained history, performing a medically appropriate examination and/or evaluation, counseling and educating the patient/family/caregiver and independently interpreting results and communicating that information with the patient/family/caregiver with greater than 50% spent in counseling and coordination  of care.       I spent 4 minutes on the separately reported service of US imaging not included in the time used to support the E/M service also reported today.      Beverley Patino MD [facility] [place]  Maternal Fetal Medicine-[facility]  Office: [phone]  [email or web address]

## 2024-03-29 NOTE — LETTER
2024     Pauline Clements MD  [address] [facility]  [address] [place] [address]    Patient: Carina Galindo   YOB: 1995   Date of Visit: 3/29/2024       Dear Tammy Hollins MD,    Thank you for referring Carina Galindo to me for evaluation. Below is a copy of my consult note.    If you have questions, please do not hesitate to call me. I look forward to following Carina along with you.         Sincerely,        Beverley Patino MD    MATERNAL FETAL MEDICINE Consult Note    Dear Dr Pauline Clements MD:    Thank you for your kind referral of Carina Galindo.  As you know, she is a 28 y.o.   at  19 6/7 weeks gestation (Estimated Date of Delivery: 24). This is a consult.      Her antepartum course is complicated by:  SLE  APLAS    Aneuploidy Screening: low risk    HPI: Today, she denies headache, blurry vision, RUQ pain. No vaginal bleeding, no contractions.     Review of History:  Past Medical History:   Diagnosis Date   • Antiphospholipid syndrome    • Anxiety    • exercise induced asthma    • GERD (gastroesophageal reflux disease)    • Iron deficiency anemia    • Knee meniscus pain, left    • Liver cyst     BX AND FOUND TO BE NEGATIVE   • Lupus    • OCD (obsessive compulsive disorder)    • Rheumatoid factor positive      Past Surgical History:   Procedure Laterality Date   • ADENOIDECTOMY     • CHOLECYSTECTOMY WITH INTRAOPERATIVE CHOLANGIOGRAM N/A 2016    Procedure: CHOLECYSTECTOMY LAPAROSCOPIC INTRAOPERATIVE CHOLANGIOGRAM;  Surgeon: Malathi Lozano MD;  [place] [facility] [place] [facility] [place];  Service:    • COLONOSCOPY     • ENDOSCOPY  2013    Gastritis    • KNEE ARTHROSCOPY Left 3/28/2023    Procedure: Knee Arthroscopy with lateral release, meniscectomy and cyst excision;  Surgeon: Tapan Craig MD;  Location: [facility] [place];  Service: Orthopedics;  Laterality: Left;   • NASAL SEPTUM SURGERY     • SHOULDER SURGERY Right    • TONSILLECTOMY      x 2 due to regrowth    • UPPER GASTROINTESTINAL ENDOSCOPY  2010   • WISDOM TOOTH EXTRACTION         Social History     Socioeconomic History   • Marital status:    • Number of children: 0   Tobacco Use   • Smoking status: Never     Passive exposure: Never   • Smokeless tobacco: Never   Vaping Use   • Vaping status: Never Used   Substance and Sexual Activity   • Alcohol use: No   • Drug use: Never   • Sexual activity: Yes     Partners: Male     Birth control/protection: None     Family History   Problem Relation Age of Onset   • Diabetes Father    • Gallbladder disease Mother 30   • Rheum arthritis Mother    • Hypertension Mother    • Endometriosis Mother    • Irritable bowel syndrome Mother    • Pancreatitis Mother    • Breast cancer Maternal Grandmother    • Heart disease Maternal Grandfather    • Breast cancer Maternal Aunt    • Congenital heart disease Cousin    • Malig Hyperthermia Neg Hx    • Deep vein thrombosis Neg Hx    • Pulmonary embolism Neg Hx       Allergies   Allergen Reactions   • Egg-Derived Products Swelling     Eyes swell shut, has some eggs cooked in food.   • Other Other (See Comments)     FOOD ALLERGIES: see list : peanuts and ranch dressing     • Shellfish-Derived Products Anaphylaxis   • Nitrofurantoin Unknown (See Comments) and Rash     Unsure    Unsure   • Peanut Butter Flavor [Flavoring Agent] Hives   • Ciprofloxacin Rash   • Erythromycin Rash     As a baby, no interaction since   • Ibuprofen Rash     Has tolerated low dose in the past   • Multihance [Gadobenate] Hives     Patient experienced one small hive on neck. Was instructed to be pre-medicated per radiologist prior to MRI's for future.    • Sulfa Antibiotics Rash   • Triamcinolone Rash      Current Outpatient Medications on File Prior to Visit   Medication Sig Dispense Refill   • acetaminophen (TYLENOL) 325 MG tablet Take 1 tablet by mouth Every 6 (Six) Hours As Needed.     • albuterol (VENTOLIN HFA) 108 (90 Base) MCG/ACT inhaler Inhale 2 puffs  Every 4 (Four) Hours As Needed for Wheezing. 18 g 1   • aspirin (ASPIR) 81 MG EC tablet Take one tablet by mouth daily until 36 weeks gestation. 90 tablet 3   • diphenhydrAMINE (BENADRYL) 25 mg capsule Take 1 capsule by mouth Every 6 (Six) Hours As Needed for Itching.     • doxylamine-pyridoxine ER (Bonjesta) 20-20 MG tablet controlled-release tablet Take 1 tablet by mouth 2 (Two) Times a Day. 60 tablet 0   • Enoxaparin Sodium (LOVENOX) 40 MG/0.4ML solution prefilled syringe syringe Inject 0.4 mL under the skin into the appropriate area as directed Daily. 12 mL 11   • hydroxychloroquine (PLAQUENIL) 200 MG tablet Take 1 tablet by mouth Daily.     • Prenatal Vit-Fe Fumarate-FA (prenatal vitamin 27-0.8) 27-0.8 MG tablet tablet Take  by mouth Daily.     • sertraline (ZOLOFT) 50 MG tablet      • Alcohol Swabs 70 % pads Use 1 Swab Daily. 100 each 11   • EPINEPHrine (EPIPEN) 0.3 MG/0.3ML solution auto-injector injection        No current facility-administered medications on file prior to visit.        Past obstetric, gynecological, medical, surgical, family and social history reviewed.  Relevant lab work and imaging reviewed.    Review of systems  Constitutional:  denies fever, chills, malaise.   ENT/Mouth:  denies sore throat, tinnitus  Eyes: denies vision changes/pain  CV:  denies chest pain  Respiratory:  denies cough/SOB  GI:  denies N/V, diarrhea, abdominal pain.    :   denies dysuria  Skin:  denies lesions or pruritus   Neuro:  denies weakness, focal neurologic symptoms    Vitals:    03/29/24 1519   BP: 105/66   BP Location: Right arm   Patient Position: Sitting   Pulse: 92   Temp: 98.2 °F (36.8 °C)   TempSrc: Temporal   Weight: 97 kg (213 lb 12.8 oz)       PHYSICAL EXAM   GENERAL: Not in acute distress, AAOx3, pleasant  CARDIO: regular rate and rhythm  PULM: symmetric chest rise, speaking in complete sentences without difficulty  NEURO: awake, alert and oriented to person, place, and time  ABDOMINAL: No fundal  tenderness, no rebound or guarding, gravid  EXTREMITIES: no bilateral lower extremity edema/tenderness  SKIN: Warm, well-perfused      ULTRASOUND   Please view full ultrasound note on Imaging tab in ViewPoint.  Breech presentation.  Anterior placenta.  MVP 3.1 cm, which is normal.    g (AC 85%)  Anatomy appears normal.    Transabdominal CL >3.5 cm, which is normal.      ASSESSMENT/COUNSELIN y.o.   at  19 6/7 weeks gestation (Estimated Date of Delivery: 24).    -Pregnancy  [ X ] stable  [   ] improving [  ] worsening    Diagnoses and all orders for this visit:    1. Systemic lupus erythematosus- + anticardio AB, neg SS-A/SS-B (Primary)    2. Antiphospholipid antibody syndrome complicating pregnancy  -     CBC & Differential; Future  -     Protein / Creatinine Ratio, Urine - Urine, Clean Catch; Future  -     Comprehensive Metabolic Panel; Future  -     Anti-DNA Antibody, Double-stranded; Future  -     Bile Acids, Total  -     C4+C3           SLE:  Previously counseled.  Patient doesn't have an official dx of SLE, but began having joint symptoms with swelling right before she got pregnant.  Her mother has a diagnosis of RA.  She has seen rheumatology and met some but not all criteria for Lupus.  However, she is APLAS positive.  Given her symptoms/constellation of findings, she likely has Lupus or a similar autoimmune issue with similar risk factors during pregnancy, so we will treat her like lupus.  She is on plaquenil and doing well on 200 a day.  Her flares involve her joints and she has never had cardiac, renal, or neuro manifestations.  Her baseline P/C is 116 and BL Cr is 0.61 prepregnancy.  Platelets 431 earlier this year.  She was negative for Ro/La antibodies earlier this year.        I recommend she continue following with her rheumatologist as they recommend and have complement levels and Anti-DS DNA levels each trimester. These will be helpful for comparison if she develops a flare or  concern for preeclampsia during this pregnancy.  She will need serial growth exams and twice BPP starting by 32 weeks between me and her primary OB.      She had some increased urine protein at last OB visit--we will get PIH labs today for surveillance in the 2nd trimester.  Discussed this will be for surveillance--she does not have preE at this point, but if she has protein, we need to know to help with diagnosis later if it comes up.     Itching:  Pt itching has a rash on hands--she sees rheum in a couple weeks and will discuss.  Likely not cholestasis, but she does also endorse some palm itching so will get bile acids to be safe. Recommended alternating hydrocortisone and benadryl cream.       APLAS with no personal history of VTE  I discussed with the patient her diagnosis of APLAS, which is based on lupus and her positive lab tests.  We reviewed the risks of pregnancy with APLAS including but not limited to miscarriage, stillbirth, fetal growth restriction, preeclampsia, indicated  birth, and venous thromboembolic event.    We discussed decreasing the risk of these complications with LMWH and baby aspirin throughout pregnancy and for 4-6 weeks post partum.  We also recommend serial growth ultrasound and ANFS twice weekly beyond 32 weeks.     Summary of Plan  -Serial growth ultrasounds every 4 weeks (MFM)   -Starting at 32 weeks: twice Weekly fetal  surveillance until delivery--pt to see what day works for OB and we will get her scheduled on a /Fri or / schedule alternating with MFM  -Continue ASA and Lovenox  -Continue plaquenil and following with rheum  -Bile acids, PIH labs, urine protein, DS DNA, complement today.  -Every trimester PIH labs and P/C recommended  -Delivery 38 weeks for APLAS, anticoagulation, lupus--unless indicated sooner    Follow-up: monthly    Thank you for the consult and opportunity to care for this patient.  Please feel free to reach out with any questions or  concerns.      I spent 15 minutes caring for this patient on this date of service. This time includes time spent by me in the following activities: preparing for the visit, reviewing tests, obtaining and/or reviewing a separately obtained history, performing a medically appropriate examination and/or evaluation, counseling and educating the patient/family/caregiver and independently interpreting results and communicating that information with the patient/family/caregiver with greater than 50% spent in counseling and coordination of care.       I spent 4 minutes on the separately reported service of US imaging not included in the time used to support the E/M service also reported today.      Beverley Patino MD FACOG  Maternal Fetal Medicine-[facility]  Office: [phone]  [email or web address]

## 2024-03-29 NOTE — PROGRESS NOTES
Pt reports that she is doing well and denies vaginal bleeding, cramping, contractions or LOF at this time. Notes occasional lower abdominal cramping after climbing the stairs in the parking garage. Not feeling fetal movements at this point in pregnancy. Reviewed when to call OB office or present to L&D for evaluation with symptoms such as vaginal bleeding, LOF or ctxs. Pt verbalized understanding. Denies HA, visual changes or epigastric pain. Denies any additional complaints at time of appointment. Next OB appointment scheduled for 4/17.    Vitals:    03/29/24 1519   BP: 105/66   Pulse: 92   Temp: 98.2 °F (36.8 °C)

## 2024-04-01 LAB — DSDNA AB SER-ACNC: 3 IU/ML (ref 0–9)

## 2024-04-08 DIAGNOSIS — O99.119 ANTIPHOSPHOLIPID ANTIBODY SYNDROME COMPLICATING PREGNANCY: ICD-10-CM

## 2024-04-08 DIAGNOSIS — O99.891 SYSTEMIC LUPUS ERYTHEMATOSUS, MATERNAL, ANTEPARTUM: Primary | ICD-10-CM

## 2024-04-08 DIAGNOSIS — L29.9 ITCHING: ICD-10-CM

## 2024-04-08 DIAGNOSIS — D68.61 ANTIPHOSPHOLIPID ANTIBODY SYNDROME COMPLICATING PREGNANCY: ICD-10-CM

## 2024-04-08 DIAGNOSIS — M32.9 SYSTEMIC LUPUS ERYTHEMATOSUS, MATERNAL, ANTEPARTUM: Primary | ICD-10-CM

## 2024-04-12 ENCOUNTER — LAB (OUTPATIENT)
Dept: LAB | Facility: HOSPITAL | Age: 29
End: 2024-04-12
Payer: COMMERCIAL

## 2024-04-12 ENCOUNTER — TELEPHONE (OUTPATIENT)
Dept: OBSTETRICS AND GYNECOLOGY | Facility: CLINIC | Age: 29
End: 2024-04-12
Payer: COMMERCIAL

## 2024-04-12 LAB — BILE AC SERPL-SCNC: 16 UMOL/L (ref 0–10)

## 2024-04-12 PROCEDURE — 36415 COLL VENOUS BLD VENIPUNCTURE: CPT | Performed by: OBSTETRICS & GYNECOLOGY

## 2024-04-12 PROCEDURE — 82239 BILE ACIDS TOTAL: CPT | Performed by: OBSTETRICS & GYNECOLOGY

## 2024-04-12 RX ORDER — URSODIOL 500 MG/1
500 TABLET, FILM COATED ORAL 3 TIMES DAILY
Qty: 90 TABLET | Refills: 11 | Status: SHIPPED | OUTPATIENT
Start: 2024-04-12 | End: 2025-04-12

## 2024-04-12 NOTE — TELEPHONE ENCOUNTER
Call placed to Carina to go over results for elevated bile acids, consistent with cholestasis. Pt notified she will need weekly surveillance at 28 weeks and twice weekly at 32 weeks. Pt notified actigall was sent to her preferred pharmacy for her to pickup and start taking 3x daily.

## 2024-04-17 ENCOUNTER — ROUTINE PRENATAL (OUTPATIENT)
Dept: OBSTETRICS AND GYNECOLOGY | Age: 29
End: 2024-04-17
Payer: COMMERCIAL

## 2024-04-17 VITALS — DIASTOLIC BLOOD PRESSURE: 64 MMHG | WEIGHT: 218 LBS | SYSTOLIC BLOOD PRESSURE: 102 MMHG | BODY MASS INDEX: 37.42 KG/M2

## 2024-04-17 DIAGNOSIS — O99.210 OBESITY IN PREGNANCY, ANTEPARTUM: ICD-10-CM

## 2024-04-17 DIAGNOSIS — O99.891 SYSTEMIC LUPUS ERYTHEMATOSUS, MATERNAL, ANTEPARTUM: ICD-10-CM

## 2024-04-17 DIAGNOSIS — M32.9 SYSTEMIC LUPUS ERYTHEMATOSUS, MATERNAL, ANTEPARTUM: ICD-10-CM

## 2024-04-17 DIAGNOSIS — O99.119 ANTIPHOSPHOLIPID ANTIBODY SYNDROME COMPLICATING PREGNANCY: ICD-10-CM

## 2024-04-17 DIAGNOSIS — D68.61 ANTIPHOSPHOLIPID ANTIBODY SYNDROME COMPLICATING PREGNANCY: ICD-10-CM

## 2024-04-17 DIAGNOSIS — O26.642 INTRAHEPATIC CHOLESTASIS OF PREGNANCY IN SECOND TRIMESTER: ICD-10-CM

## 2024-04-17 DIAGNOSIS — Z34.02 ENCOUNTER FOR PRENATAL CARE IN SECOND TRIMESTER OF FIRST PREGNANCY: Primary | ICD-10-CM

## 2024-04-17 DIAGNOSIS — Z13.89 SCREENING FOR BLOOD OR PROTEIN IN URINE: ICD-10-CM

## 2024-04-17 DIAGNOSIS — F41.9 ANXIETY: ICD-10-CM

## 2024-04-17 LAB
BILIRUB BLD-MCNC: NEGATIVE MG/DL
GLUCOSE UR STRIP-MCNC: NEGATIVE MG/DL
KETONES UR QL: NEGATIVE
LEUKOCYTE EST, POC: ABNORMAL
NITRITE UR-MCNC: NEGATIVE MG/ML
PH UR: 7.5 [PH] (ref 5–8)
PROT UR STRIP-MCNC: NEGATIVE MG/DL
RBC # UR STRIP: NEGATIVE /UL
SP GR UR: 1.02 (ref 1–1.03)
UROBILINOGEN UR QL: NORMAL

## 2024-04-17 PROCEDURE — 0502F SUBSEQUENT PRENATAL CARE: CPT | Performed by: OBSTETRICS & GYNECOLOGY

## 2024-04-18 ENCOUNTER — TELEPHONE (OUTPATIENT)
Dept: OBSTETRICS AND GYNECOLOGY | Age: 29
End: 2024-04-18
Payer: COMMERCIAL

## 2024-04-18 PROBLEM — O99.019 MATERNAL ANEMIA IN PREGNANCY, ANTEPARTUM: Status: ACTIVE | Noted: 2024-04-18

## 2024-04-18 LAB
ALBUMIN SERPL-MCNC: 3.7 G/DL (ref 3.5–5.2)
ALBUMIN/GLOB SERPL: 1.3 G/DL
ALP SERPL-CCNC: 130 U/L (ref 39–117)
ALT SERPL-CCNC: 12 U/L (ref 1–33)
AST SERPL-CCNC: 13 U/L (ref 1–32)
BILIRUB SERPL-MCNC: 0.3 MG/DL (ref 0–1.2)
BUN SERPL-MCNC: 5 MG/DL (ref 6–20)
BUN/CREAT SERPL: 9.6 (ref 7–25)
C3 SERPL-MCNC: 154 MG/DL (ref 82–167)
C4 SERPL-MCNC: 20 MG/DL (ref 12–38)
CALCIUM SERPL-MCNC: 9 MG/DL (ref 8.6–10.5)
CHLORIDE SERPL-SCNC: 109 MMOL/L (ref 98–107)
CO2 SERPL-SCNC: 18.8 MMOL/L (ref 22–29)
CREAT SERPL-MCNC: 0.52 MG/DL (ref 0.57–1)
CREAT UR-MCNC: 84.3 MG/DL
EGFRCR SERPLBLD CKD-EPI 2021: 128.4 ML/MIN/1.73
ERYTHROCYTE [DISTWIDTH] IN BLOOD BY AUTOMATED COUNT: 13.2 % (ref 12.3–15.4)
GLOBULIN SER CALC-MCNC: 2.8 GM/DL
GLUCOSE SERPL-MCNC: 75 MG/DL (ref 65–99)
HCT VFR BLD AUTO: 34.4 % (ref 34–46.6)
HGB BLD-MCNC: 11.8 G/DL (ref 12–15.9)
MCH RBC QN AUTO: 30.6 PG (ref 26.6–33)
MCHC RBC AUTO-ENTMCNC: 34.3 G/DL (ref 31.5–35.7)
MCV RBC AUTO: 89.1 FL (ref 79–97)
PLATELET # BLD AUTO: 315 10*3/MM3 (ref 140–450)
POTASSIUM SERPL-SCNC: 4.2 MMOL/L (ref 3.5–5.2)
PROT SERPL-MCNC: 6.5 G/DL (ref 6–8.5)
PROT UR-MCNC: 7.7 MG/DL
PROT/CREAT UR: 91 MG/G CREAT (ref 0–200)
RBC # BLD AUTO: 3.86 10*6/MM3 (ref 3.77–5.28)
SODIUM SERPL-SCNC: 140 MMOL/L (ref 136–145)
WBC # BLD AUTO: 12.15 10*3/MM3 (ref 3.4–10.8)

## 2024-04-18 RX ORDER — FERROUS SULFATE 325(65) MG
325 TABLET ORAL
Qty: 90 TABLET | Refills: 2 | Status: SHIPPED | OUTPATIENT
Start: 2024-04-18

## 2024-04-18 NOTE — TELEPHONE ENCOUNTER
----- Message from Nurys Liz MA sent at 4/18/2024 11:32 AM EDT -----  Results  stable lab work , patient not taking iron supplements only prenatal vitamins .

## 2024-04-22 ENCOUNTER — HOSPITAL ENCOUNTER (OUTPATIENT)
Dept: ULTRASOUND IMAGING | Facility: HOSPITAL | Age: 29
Discharge: HOME | End: 2024-04-22
Admitting: OBSTETRICS & GYNECOLOGY
Payer: COMMERCIAL

## 2024-04-22 ENCOUNTER — OFFICE VISIT (OUTPATIENT)
Dept: OBSTETRICS AND GYNECOLOGY | Facility: CLINIC | Age: 29
End: 2024-04-22
Payer: COMMERCIAL

## 2024-04-22 VITALS
HEIGHT: 64 IN | HEART RATE: 106 BPM | DIASTOLIC BLOOD PRESSURE: 77 MMHG | SYSTOLIC BLOOD PRESSURE: 119 MMHG | TEMPERATURE: 99.1 F | BODY MASS INDEX: 37.05 KG/M2 | WEIGHT: 217 LBS

## 2024-04-22 DIAGNOSIS — M32.9 SYSTEMIC LUPUS ERYTHEMATOSUS, MATERNAL, ANTEPARTUM: ICD-10-CM

## 2024-04-22 DIAGNOSIS — O99.119 ANTIPHOSPHOLIPID ANTIBODY SYNDROME COMPLICATING PREGNANCY: ICD-10-CM

## 2024-04-22 DIAGNOSIS — O99.891 SYSTEMIC LUPUS ERYTHEMATOSUS, MATERNAL, ANTEPARTUM: ICD-10-CM

## 2024-04-22 DIAGNOSIS — O99.119 ANTIPHOSPHOLIPID ANTIBODY SYNDROME COMPLICATING PREGNANCY: Primary | ICD-10-CM

## 2024-04-22 DIAGNOSIS — O26.642 INTRAHEPATIC CHOLESTASIS OF PREGNANCY IN SECOND TRIMESTER: ICD-10-CM

## 2024-04-22 DIAGNOSIS — D68.61 ANTIPHOSPHOLIPID ANTIBODY SYNDROME COMPLICATING PREGNANCY: ICD-10-CM

## 2024-04-22 DIAGNOSIS — D68.61 ANTIPHOSPHOLIPID ANTIBODY SYNDROME COMPLICATING PREGNANCY: Primary | ICD-10-CM

## 2024-04-22 PROCEDURE — 99213 OFFICE O/P EST LOW 20 MIN: CPT | Performed by: OBSTETRICS & GYNECOLOGY

## 2024-04-22 PROCEDURE — 76816 OB US FOLLOW-UP PER FETUS: CPT | Performed by: OBSTETRICS & GYNECOLOGY

## 2024-04-22 PROCEDURE — 76816 OB US FOLLOW-UP PER FETUS: CPT

## 2024-04-22 NOTE — PROGRESS NOTES
Pt reports that she is doing well and denies vaginal bleeding, cramping, contractions or LOF at this time. Reports active fetal movement. Reviewed when to call OB office or present to L&D for evaluation with symptoms such as decreased fetal movement, vaginal bleeding, LOF or ctxs. Pt verbalized understanding. Denies HA, visual changes or epigastric pain. Denies any additional complaints at time of appointment. Next OB appointment scheduled for 05/17/2024    Vitals:    04/22/24 0900   BP: 119/77   Pulse: 106   Temp: 99.1 °F (37.3 °C)

## 2024-04-22 NOTE — LETTER
2024       No Recipients    Patient: Carina Galindo   YOB: 1995   Date of Visit: 2024       Dear Pauline Clements MD    Carina Galindo was in my office today. Below is a copy of my note.    If you have questions, please do not hesitate to call me. I look forward to following Carina along with you.         Sincerely,        Beverley Patino MD    MATERNAL FETAL MEDICINE Consult Note    Dear Dr Pauline Clements MD:    Thank you for your kind referral of Carina Galindo.  As you know, she is a 28 y.o.   at  23 2/7 weeks gestation (Estimated Date of Delivery: 24). This is a consult.      Her antepartum course is complicated by:  SLE  APLAS  Cholestasis    Aneuploidy Screening: low risk    HPI: Today, she denies headache, blurry vision, RUQ pain. No vaginal bleeding, no contractions.     Review of History:  Past Medical History:   Diagnosis Date   • Antiphospholipid syndrome    • Anxiety    • exercise induced asthma    • GERD (gastroesophageal reflux disease)    • Iron deficiency anemia    • Knee meniscus pain, left    • Liver cyst     BX AND FOUND TO BE NEGATIVE   • Lupus    • OCD (obsessive compulsive disorder)    • Rheumatoid factor positive      Past Surgical History:   Procedure Laterality Date   • ADENOIDECTOMY     • CHOLECYSTECTOMY WITH INTRAOPERATIVE CHOLANGIOGRAM N/A 2016    Procedure: CHOLECYSTECTOMY LAPAROSCOPIC INTRAOPERATIVE CHOLANGIOGRAM;  Surgeon: Malathi Lozano MD;  [place] [facility] [place] [facility] [place];  Service:    • COLONOSCOPY     • ENDOSCOPY  2013    Gastritis    • KNEE ARTHROSCOPY Left 3/28/2023    Procedure: Knee Arthroscopy with lateral release, meniscectomy and cyst excision;  Surgeon: Tapan Craig MD;  Location: [facility] [place] [facility] [place];  Service: Orthopedics;  Laterality: Left;   • NASAL SEPTUM SURGERY     • SHOULDER SURGERY Right    • TONSILLECTOMY      x 2 due to regrowth   • UPPER GASTROINTESTINAL ENDOSCOPY     • WISDOM TOOTH EXTRACTION          Social History     Socioeconomic History   • Marital status:    • Number of children: 0   Tobacco Use   • Smoking status: Never     Passive exposure: Never   • Smokeless tobacco: Never   Vaping Use   • Vaping status: Never Used   Substance and Sexual Activity   • Alcohol use: No   • Drug use: Never   • Sexual activity: Yes     Partners: Male     Birth control/protection: None     Family History   Problem Relation Age of Onset   • Diabetes Father    • Gallbladder disease Mother 30   • Rheum arthritis Mother    • Hypertension Mother    • Endometriosis Mother    • Irritable bowel syndrome Mother    • Pancreatitis Mother    • Breast cancer Maternal Grandmother    • Heart disease Maternal Grandfather    • Breast cancer Maternal Aunt    • Congenital heart disease Cousin    • Malig Hyperthermia Neg Hx    • Deep vein thrombosis Neg Hx    • Pulmonary embolism Neg Hx       Allergies   Allergen Reactions   • Egg-Derived Products Swelling     Eyes swell shut, has some eggs cooked in food.   • Other Other (See Comments)     FOOD ALLERGIES: see list : peanuts and ranch dressing     • Shellfish-Derived Products Anaphylaxis   • Nitrofurantoin Unknown (See Comments) and Rash     Unsure    Unsure   • Peanut Butter Flavor [Flavoring Agent] Hives   • Ciprofloxacin Rash   • Erythromycin Rash     As a baby, no interaction since   • Ibuprofen Rash     Has tolerated low dose in the past   • Multihance [Gadobenate] Hives     Patient experienced one small hive on neck. Was instructed to be pre-medicated per radiologist prior to MRI's for future.    • Sulfa Antibiotics Rash   • Triamcinolone Rash      Current Outpatient Medications on File Prior to Visit   Medication Sig Dispense Refill   • acetaminophen (TYLENOL) 325 MG tablet Take 1 tablet by mouth Every 6 (Six) Hours As Needed.     • albuterol (VENTOLIN HFA) 108 (90 Base) MCG/ACT inhaler Inhale 2 puffs Every 4 (Four) Hours As Needed for Wheezing. 18 g 1   • Alcohol Swabs 70 %  "pads Use 1 Swab Daily. 100 each 11   • aspirin (ASPIR) 81 MG EC tablet Take one tablet by mouth daily until 36 weeks gestation. 90 tablet 3   • diphenhydrAMINE (BENADRYL) 25 mg capsule Take 1 capsule by mouth Every 6 (Six) Hours As Needed for Itching.     • doxylamine-pyridoxine ER (Bonjesta) 20-20 MG tablet controlled-release tablet Take 1 tablet by mouth 2 (Two) Times a Day. 60 tablet 0   • Enoxaparin Sodium (LOVENOX) 40 MG/0.4ML solution prefilled syringe syringe Inject 0.4 mL under the skin into the appropriate area as directed Daily. 12 mL 11   • ferrous sulfate 325 (65 FE) MG tablet Take 1 tablet by mouth Daily With Breakfast. 90 tablet 2   • hydroxychloroquine (PLAQUENIL) 200 MG tablet Take 1 tablet by mouth Daily.     • Prenatal Vit-Fe Fumarate-FA (prenatal vitamin 27-0.8) 27-0.8 MG tablet tablet Take  by mouth Daily.     • sertraline (ZOLOFT) 50 MG tablet      • ursodiol (ACTIGALL) 500 MG tablet Take 1 tablet by mouth 3 (Three) Times a Day. 90 tablet 11   • EPINEPHrine (EPIPEN) 0.3 MG/0.3ML solution auto-injector injection        No current facility-administered medications on file prior to visit.        Past obstetric, gynecological, medical, surgical, family and social history reviewed.  Relevant lab work and imaging reviewed.    Review of systems  Constitutional:  denies fever, chills, malaise.   ENT/Mouth:  denies sore throat, tinnitus  Eyes: denies vision changes/pain  CV:  denies chest pain  Respiratory:  denies cough/SOB  GI:  denies N/V, diarrhea, abdominal pain.    :   denies dysuria  Skin:  denies lesions or pruritus   Neuro:  denies weakness, focal neurologic symptoms    Vitals:    04/22/24 0900   BP: 119/77   BP Location: Right arm   Patient Position: Sitting   Pulse: 106   Temp: 99.1 °F (37.3 °C)   TempSrc: Temporal   Weight: 98.4 kg (217 lb)   Height: 162.6 cm (64\")       PHYSICAL EXAM   GENERAL: Not in acute distress, AAOx3, pleasant  CARDIO: regular rate and rhythm  PULM: symmetric chest " rise, speaking in complete sentences without difficulty  NEURO: awake, alert and oriented to person, place, and time  ABDOMINAL: No fundal tenderness, no rebound or guarding, gravid  EXTREMITIES: no bilateral lower extremity edema/tenderness  SKIN: Warm, well-perfused      ULTRASOUND   Please view full ultrasound note on Imaging tab in ViewPoint.  Transverse presentation.  Anterior placenta.  MVP 4.9 cm, which is normal.    g (69%, AC 89%)  Normal appearing follow up anatomy.      ASSESSMENT/COUNSELIN y.o.   at  23 2/7 weeks gestation (Estimated Date of Delivery: 24).     -Pregnancy  [ X ] stable  [   ] improving [  ] worsening    Diagnoses and all orders for this visit:    1. Antiphospholipid antibody syndrome complicating pregnancy- cardiolipin AB + (Primary)  -     Bile Acids, Total  -     Heparin Anti-Xa LMWH; Future    2. Systemic lupus erythematosus- + anticardio AB, neg SS-A/SS-B    3. Intrahepatic cholestasis of pregnancy in second trimester        SLE:  Previously counseled.  Patient doesn't have an official dx of SLE, but began having joint symptoms with swelling right before she got pregnant.  Her mother has a diagnosis of RA.  She has seen rheumatology and met some but not all criteria for Lupus.  However, she is APLAS positive.  Given her symptoms/constellation of findings, she likely has Lupus or a similar autoimmune issue with similar risk factors during pregnancy, so we will treat her like lupus.  She is on plaquenil and doing well on 200 a day.  Her flares involve her joints and she has never had cardiac, renal, or neuro manifestations.  Her baseline P/C is 116 and BL Cr is 0.61 prepregnancy.  Platelets 431 earlier this year.  She was negative for Ro/La antibodies earlier this year.        I recommend she continue following with her rheumatologist as they recommend and have complement levels and Anti-DS DNA levels each trimester. These will be helpful for comparison if she  develops a flare or concern for preeclampsia during this pregnancy.  She will need serial growth exams and twice weekly ANFS starting by 32 weeks between me and her primary OB.      PIH labs appropriate.     Cholestasis:  Intrahepatic cholestasis of pregnancy (ICP) occurs in the second and third trimesters of pregnancy and is characterized by pruritus and elevated serum bile acid levels. The  incidence has been estimated to range from 0.3% to 15% in various populations. ICP confers increased risk to the baby including  delivery and stillbirth.   In pregnancy, cholestasis is most often self-limited and resolves after delivery. A total serum bile acid level of >10 mmol/L is often used to diagnose ICP.  Increases in liver enzymes can also be seen and are often used to make a presumptive diagnosis in the clinical setting of itching on the palms/soles while we await bile acids, as in this patient.  She is currently on ursidiol 500 mg BID which I recommended she continue.  There is definitely evidence it decreases maternal symptoms and inconsistent evidence that it also reduces adverse pregnancy outcomes    The rate of stillbirth after 37 weeks at baseline is 0.1-0.3% in the [place], but for cholestasis, the incidence is approximately 1-2%, thus I recommend delivery at 37 weeks.   In a prospective cohort study evaluating patients affected by ICP with total bile acid levels of 40 mmol/L and higher, it was found that the aOR for stillbirth was 2.58 for adverse fetal outcome when compared with the baseline data of patients without cholestasis.    The pathophysiology of stillbirth is poorly understood but is likely related to fetal arrythmia or vasospasm of placental vessels.  There is also evidence of increased risk of pre-eclampsia positively correlated with bile acid level.  Women with bile acids >40 are at highest risk with dx of pre-eclampsia often occurring 2-4 weeks after diagnosis of cholestasis.      I recommend 2x  per week  fetal surveillance for this diagnosis (definitely 2x per week if bile acids over 40).      Delivery between 34 and 36 weeks of gestation can be considered in women with ICP, with total bile acid levels of100 mmol/L, and with any of the followin) excruciating/ unremitting maternal pruritus not  relieved with ursodiol  2)Hx of stillbirth due to ICP  3)Evidence of worsening hepatic function    Discussed risks fetal problems due to Cholestasis including fetal arrhthmias and even fetal death.  Therefore the recommendation is close (biweekly) fetal monitoring.  Follow      APLAS with no personal history of VTE  I discussed with the patient her diagnosis of APLAS, which is based on lupus and her positive lab tests.  We reviewed the risks of pregnancy with APLAS including but not limited to miscarriage, stillbirth, fetal growth restriction, preeclampsia, indicated  birth, and venous thromboembolic event.    We discussed decreasing the risk of these complications with LMWH and baby aspirin throughout pregnancy and for 4-6 weeks post partum.  We also recommend serial growth ultrasound and ANFS twice weekly beyond 32 weeks.    She asked about increasing lovenox--I typically recommend ppx dosing, but happy to get a level to see how she is doing.  We will do an anti-xa with next bile acids--ordered and she will get in 2 weeks.      Summary of Plan  -Serial growth ultrasounds every 4 weeks (MFM)   -Starting at 32 weeks: twice Weekly fetal  surveillance until delivery--we will do NSTs here on  and BPP's at Dr. Hollins's office on .  We will switch the NST for a growth every 4 weeks.    -Continue ursodiol and watch bile acids monthly along with PIH labs, DS DNA, complement  -Continue ASA and Lovenox  -Continue plaquenil and following with rheum  -Delivery 37 weeks for APLAS, anticoagulation, lupus, cholestasis    Follow-up: monthly    Thank you for the consult and opportunity to  care for this patient.  Please feel free to reach out with any questions or concerns.      I spent 22 minutes caring for this patient on this date of service. This time includes time spent by me in the following activities: preparing for the visit, reviewing tests, obtaining and/or reviewing a separately obtained history, performing a medically appropriate examination and/or evaluation, counseling and educating the patient/family/caregiver and independently interpreting results and communicating that information with the patient/family/caregiver with greater than 50% spent in counseling and coordination of care.       I spent 4 minutes on the separately reported service of US imaging not included in the time used to support the E/M service also reported today.      Beverley Patino MD [facility] [place] [facility]etal [facility]  Office: [phone]  [email or web address]

## 2024-04-22 NOTE — PROGRESS NOTES
MATERNAL FETAL MEDICINE Consult Note    Dear Dr Pauline Clements MD:    Thank you for your kind referral of Carina Galindo.  As you know, she is a 28 y.o.   at  23 2/7 weeks gestation (Estimated Date of Delivery: 24). This is a consult.      Her antepartum course is complicated by:  SLE  APLAS  Cholestasis    Aneuploidy Screening: low risk    HPI: Today, she denies headache, blurry vision, RUQ pain. No vaginal bleeding, no contractions.     Review of History:  Past Medical History:   Diagnosis Date    Antiphospholipid syndrome     Anxiety     exercise induced asthma     GERD (gastroesophageal reflux disease)     Iron deficiency anemia     Knee meniscus pain, left     Liver cyst     BX AND FOUND TO BE NEGATIVE    Lupus     OCD (obsessive compulsive disorder)     Rheumatoid factor positive      Past Surgical History:   Procedure Laterality Date    ADENOIDECTOMY      CHOLECYSTECTOMY WITH INTRAOPERATIVE CHOLANGIOGRAM N/A 2016    Procedure: CHOLECYSTECTOMY LAPAROSCOPIC INTRAOPERATIVE CHOLANGIOGRAM;  Surgeon: Malathi Lozano MD;  Location: [facility] OR [facility] [place];  Service:     COLONOSCOPY  2010    ENDOSCOPY  2013    Gastritis     KNEE ARTHROSCOPY Left 3/28/2023    Procedure: Knee Arthroscopy with lateral release, meniscectomy and cyst excision;  Surgeon: Tapan Craig MD;  Location: [facility] OR [facility] [place];  Service: Orthopedics;  Laterality: Left;    NASAL SEPTUM SURGERY      SHOULDER SURGERY Right 2016    TONSILLECTOMY      x 2 due to regrowth    UPPER GASTROINTESTINAL ENDOSCOPY  2010    WISDOM TOOTH EXTRACTION         Social History     Socioeconomic History    Marital status:     Number of children: 0   Tobacco Use    Smoking status: Never     Passive exposure: Never    Smokeless tobacco: Never   Vaping Use    Vaping status: Never Used   Substance and Sexual Activity    Alcohol use: No    Drug use: Never    Sexual activity: Yes     Partners: Male     Birth control/protection: None     Family History    Problem Relation Age of Onset    Diabetes Father     Gallbladder disease Mother 30    Rheum arthritis Mother     Hypertension Mother     Endometriosis Mother     Irritable bowel syndrome Mother     Pancreatitis Mother     Breast cancer Maternal Grandmother     Heart disease Maternal Grandfather     Breast cancer Maternal Aunt     Congenital heart disease Cousin     Elissa Hyperthermia Neg Hx     Deep vein thrombosis Neg Hx     Pulmonary embolism Neg Hx       Allergies   Allergen Reactions    Egg-Derived Products Swelling     Eyes swell shut, has some eggs cooked in food.    Other Other (See Comments)     FOOD ALLERGIES: see list : peanuts and ranch dressing      Shellfish-Derived Products Anaphylaxis    Nitrofurantoin Unknown (See Comments) and Rash     Unsure    Unsure    Peanut Butter Flavor [Flavoring Agent] Hives    Ciprofloxacin Rash    Erythromycin Rash     As a baby, no interaction since    Ibuprofen Rash     Has tolerated low dose in the past    Multihance [Gadobenate] Hives     Patient experienced one small hive on neck. Was instructed to be pre-medicated per radiologist prior to MRI's for future.     Sulfa Antibiotics Rash    Triamcinolone Rash      Current Outpatient Medications on File Prior to Visit   Medication Sig Dispense Refill    acetaminophen (TYLENOL) 325 MG tablet Take 1 tablet by mouth Every 6 (Six) Hours As Needed.      albuterol (VENTOLIN HFA) 108 (90 Base) MCG/ACT inhaler Inhale 2 puffs Every 4 (Four) Hours As Needed for Wheezing. 18 g 1    Alcohol Swabs 70 % pads Use 1 Swab Daily. 100 each 11    aspirin (ASPIR) 81 MG EC tablet Take one tablet by mouth daily until 36 weeks gestation. 90 tablet 3    diphenhydrAMINE (BENADRYL) 25 mg capsule Take 1 capsule by mouth Every 6 (Six) Hours As Needed for Itching.      doxylamine-pyridoxine ER (Bonjesta) 20-20 MG tablet controlled-release tablet Take 1 tablet by mouth 2 (Two) Times a Day. 60 tablet 0    Enoxaparin Sodium (LOVENOX) 40 MG/0.4ML  "solution prefilled syringe syringe Inject 0.4 mL under the skin into the appropriate area as directed Daily. 12 mL 11    ferrous sulfate 325 (65 FE) MG tablet Take 1 tablet by mouth Daily With Breakfast. 90 tablet 2    hydroxychloroquine (PLAQUENIL) 200 MG tablet Take 1 tablet by mouth Daily.      Prenatal Vit-Fe Fumarate-FA (prenatal vitamin 27-0.8) 27-0.8 MG tablet tablet Take  by mouth Daily.      sertraline (ZOLOFT) 50 MG tablet       ursodiol (ACTIGALL) 500 MG tablet Take 1 tablet by mouth 3 (Three) Times a Day. 90 tablet 11    EPINEPHrine (EPIPEN) 0.3 MG/0.3ML solution auto-injector injection        No current facility-administered medications on file prior to visit.        Past obstetric, gynecological, medical, surgical, family and social history reviewed.  Relevant lab work and imaging reviewed.    Review of systems  Constitutional:  denies fever, chills, malaise.   ENT/Mouth:  denies sore throat, tinnitus  Eyes: denies vision changes/pain  CV:  denies chest pain  Respiratory:  denies cough/SOB  GI:  denies N/V, diarrhea, abdominal pain.    :   denies dysuria  Skin:  denies lesions or pruritus   Neuro:  denies weakness, focal neurologic symptoms    Vitals:    04/22/24 0900   BP: 119/77   BP Location: Right arm   Patient Position: Sitting   Pulse: 106   Temp: 99.1 °F (37.3 °C)   TempSrc: Temporal   Weight: 98.4 kg (217 lb)   Height: 162.6 cm (64\")       PHYSICAL EXAM   GENERAL: Not in acute distress, AAOx3, pleasant  CARDIO: regular rate and rhythm  PULM: symmetric chest rise, speaking in complete sentences without difficulty  NEURO: awake, alert and oriented to person, place, and time  ABDOMINAL: No fundal tenderness, no rebound or guarding, gravid  EXTREMITIES: no bilateral lower extremity edema/tenderness  SKIN: Warm, well-perfused      ULTRASOUND   Please view full ultrasound note on Imaging tab in ViewPoint.  Transverse presentation.  Anterior placenta.  MVP 4.9 cm, which is normal.    g (69%, " AC 89%)  Normal appearing follow up anatomy.      ASSESSMENT/COUNSELIN y.o.   at  23 2/7 weeks gestation (Estimated Date of Delivery: 24).     -Pregnancy  [ X ] stable  [   ] improving [  ] worsening    Diagnoses and all orders for this visit:    1. Antiphospholipid antibody syndrome complicating pregnancy- cardiolipin AB + (Primary)  -     Bile Acids, Total  -     Heparin Anti-Xa LMWH; Future    2. Systemic lupus erythematosus- + anticardio AB, neg SS-A/SS-B    3. Intrahepatic cholestasis of pregnancy in second trimester        SLE:  Previously counseled.  Patient doesn't have an official dx of SLE, but began having joint symptoms with swelling right before she got pregnant.  Her mother has a diagnosis of RA.  She has seen rheumatology and met some but not all criteria for Lupus.  However, she is APLAS positive.  Given her symptoms/constellation of findings, she likely has Lupus or a similar autoimmune issue with similar risk factors during pregnancy, so we will treat her like lupus.  She is on plaquenil and doing well on 200 a day.  Her flares involve her joints and she has never had cardiac, renal, or neuro manifestations.  Her baseline P/C is 116 and BL Cr is 0.61 prepregnancy.  Platelets 431 earlier this year.  She was negative for Ro/La antibodies earlier this year.        I recommend she continue following with her rheumatologist as they recommend and have complement levels and Anti-DS DNA levels each trimester. These will be helpful for comparison if she develops a flare or concern for preeclampsia during this pregnancy.  She will need serial growth exams and twice weekly ANFS starting by 32 weeks between me and her primary OB.      PIH labs appropriate.     Cholestasis:  Intrahepatic cholestasis of pregnancy (ICP) occurs in the second and third trimesters of pregnancy and is characterized by pruritus and elevated serum bile acid levels. The  incidence has been estimated to range from 0.3%  to 15% in various populations. ICP confers increased risk to the baby including  delivery and stillbirth.   In pregnancy, cholestasis is most often self-limited and resolves after delivery. A total serum bile acid level of >10 mmol/L is often used to diagnose ICP.  Increases in liver enzymes can also be seen and are often used to make a presumptive diagnosis in the clinical setting of itching on the palms/soles while we await bile acids, as in this patient.  She is currently on ursidiol 500 mg BID which I recommended she continue.  There is definitely evidence it decreases maternal symptoms and inconsistent evidence that it also reduces adverse pregnancy outcomes    The rate of stillbirth after 37 weeks at baseline is 0.1-0.3% in the [place], but for cholestasis, the incidence is approximately 1-2%, thus I recommend delivery at 37 weeks.   In a prospective cohort study evaluating patients affected by ICP with total bile acid levels of 40 mmol/L and higher, it was found that the aOR for stillbirth was 2.58 for adverse fetal outcome when compared with the baseline data of patients without cholestasis.    The pathophysiology of stillbirth is poorly understood but is likely related to fetal arrythmia or vasospasm of placental vessels.  There is also evidence of increased risk of pre-eclampsia positively correlated with bile acid level.  Women with bile acids >40 are at highest risk with dx of pre-eclampsia often occurring 2-4 weeks after diagnosis of cholestasis.      I recommend 2x per week  fetal surveillance for this diagnosis (definitely 2x per week if bile acids over 40).      Delivery between 34 and 36 weeks of gestation can be considered in women with ICP, with total bile acid levels of100 mmol/L, and with any of the followin) excruciating/ unremitting maternal pruritus not  relieved with ursodiol  2)Hx of stillbirth due to ICP  3)Evidence of worsening hepatic function    Discussed risks fetal  problems due to Cholestasis including fetal arrhthmias and even fetal death.  Therefore the recommendation is close (biweekly) fetal monitoring.  Follow      APLAS with no personal history of VTE  I discussed with the patient her diagnosis of APLAS, which is based on lupus and her positive lab tests.  We reviewed the risks of pregnancy with APLAS including but not limited to miscarriage, stillbirth, fetal growth restriction, preeclampsia, indicated  birth, and venous thromboembolic event.    We discussed decreasing the risk of these complications with LMWH and baby aspirin throughout pregnancy and for 4-6 weeks post partum.  We also recommend serial growth ultrasound and ANFS twice weekly beyond 32 weeks.    She asked about increasing lovenox--I typically recommend ppx dosing, but happy to get a level to see how she is doing.  We will do an anti-xa with next bile acids--ordered and she will get in 2 weeks.      Summary of Plan  -Serial growth ultrasounds every 4 weeks (MFM)   -Starting at 32 weeks: twice Weekly fetal  surveillance until delivery--we will do NSTs here on  and BPP's at Dr. Hollins's office on .  We will switch the NST for a growth every 4 weeks.    -Continue ursodiol and watch bile acids monthly along with PIH labs, DS DNA, complement  -Continue ASA and Lovenox  -Continue plaquenil and following with rheum  -Delivery 37 weeks for APLAS, anticoagulation, lupus, cholestasis    Follow-up: monthly    Thank you for the consult and opportunity to care for this patient.  Please feel free to reach out with any questions or concerns.      I spent 22 minutes caring for this patient on this date of service. This time includes time spent by me in the following activities: preparing for the visit, reviewing tests, obtaining and/or reviewing a separately obtained history, performing a medically appropriate examination and/or evaluation, counseling and educating the  patient/family/caregiver and independently interpreting results and communicating that information with the patient/family/caregiver with greater than 50% spent in counseling and coordination of care.       I spent 4 minutes on the separately reported service of US imaging not included in the time used to support the E/M service also reported today.      Beverley Patino MD FACOG  Maternal Fetal Medicine-[facility]  Office: [phone]  [email or web address]

## 2024-05-06 ENCOUNTER — LAB (OUTPATIENT)
Dept: LAB | Facility: HOSPITAL | Age: 29
End: 2024-05-06
Payer: COMMERCIAL

## 2024-05-06 DIAGNOSIS — O99.119 ANTIPHOSPHOLIPID ANTIBODY SYNDROME COMPLICATING PREGNANCY: ICD-10-CM

## 2024-05-06 DIAGNOSIS — D68.61 ANTIPHOSPHOLIPID ANTIBODY SYNDROME COMPLICATING PREGNANCY: ICD-10-CM

## 2024-05-06 LAB
BILE AC SERPL-SCNC: 9 UMOL/L (ref 0–10)
UFH PPP CHRO-ACNC: <0.1 IU/ML

## 2024-05-06 PROCEDURE — 36415 COLL VENOUS BLD VENIPUNCTURE: CPT

## 2024-05-06 PROCEDURE — 82239 BILE ACIDS TOTAL: CPT | Performed by: OBSTETRICS & GYNECOLOGY

## 2024-05-06 PROCEDURE — 85520 HEPARIN ASSAY: CPT

## 2024-05-17 ENCOUNTER — TELEPHONE (OUTPATIENT)
Dept: OBSTETRICS AND GYNECOLOGY | Age: 29
End: 2024-05-17

## 2024-05-17 ENCOUNTER — ROUTINE PRENATAL (OUTPATIENT)
Dept: OBSTETRICS AND GYNECOLOGY | Age: 29
End: 2024-05-17
Payer: COMMERCIAL

## 2024-05-17 VITALS — DIASTOLIC BLOOD PRESSURE: 72 MMHG | WEIGHT: 223 LBS | SYSTOLIC BLOOD PRESSURE: 110 MMHG | BODY MASS INDEX: 38.28 KG/M2

## 2024-05-17 DIAGNOSIS — O99.891 SYSTEMIC LUPUS ERYTHEMATOSUS, MATERNAL, ANTEPARTUM: ICD-10-CM

## 2024-05-17 DIAGNOSIS — Z13.0 SCREENING FOR IRON DEFICIENCY ANEMIA: ICD-10-CM

## 2024-05-17 DIAGNOSIS — O26.642 INTRAHEPATIC CHOLESTASIS OF PREGNANCY IN SECOND TRIMESTER: ICD-10-CM

## 2024-05-17 DIAGNOSIS — Z13.89 SCREENING FOR BLOOD OR PROTEIN IN URINE: ICD-10-CM

## 2024-05-17 DIAGNOSIS — D68.61 ANTIPHOSPHOLIPID ANTIBODY SYNDROME COMPLICATING PREGNANCY: ICD-10-CM

## 2024-05-17 DIAGNOSIS — O99.019 MATERNAL ANEMIA IN PREGNANCY, ANTEPARTUM: ICD-10-CM

## 2024-05-17 DIAGNOSIS — F41.9 ANXIETY: ICD-10-CM

## 2024-05-17 DIAGNOSIS — Z23 ENCOUNTER FOR ADMINISTRATION OF VACCINE: ICD-10-CM

## 2024-05-17 DIAGNOSIS — O99.210 OBESITY IN PREGNANCY, ANTEPARTUM: ICD-10-CM

## 2024-05-17 DIAGNOSIS — O99.119 ANTIPHOSPHOLIPID ANTIBODY SYNDROME COMPLICATING PREGNANCY: ICD-10-CM

## 2024-05-17 DIAGNOSIS — Z34.02 ENCOUNTER FOR PRENATAL CARE IN SECOND TRIMESTER OF FIRST PREGNANCY: Primary | ICD-10-CM

## 2024-05-17 DIAGNOSIS — Z13.1 SCREENING FOR DIABETES MELLITUS: ICD-10-CM

## 2024-05-17 DIAGNOSIS — M32.9 SYSTEMIC LUPUS ERYTHEMATOSUS, MATERNAL, ANTEPARTUM: ICD-10-CM

## 2024-05-17 LAB
BILIRUB BLD-MCNC: NEGATIVE MG/DL
GLUCOSE UR STRIP-MCNC: NEGATIVE MG/DL
KETONES UR QL: NEGATIVE
LEUKOCYTE EST, POC: NEGATIVE
NITRITE UR-MCNC: NEGATIVE MG/ML
PH UR: 6.5 [PH] (ref 5–8)
PROT UR STRIP-MCNC: NEGATIVE MG/DL
RBC # UR STRIP: NEGATIVE /UL
SP GR UR: 1.01 (ref 1–1.03)
UROBILINOGEN UR QL: NORMAL

## 2024-05-17 NOTE — PROGRESS NOTES
Chief Complaint   Patient presents with    Routine Prenatal Visit     Cc: ob check with  1 hr gtt /tdap , no ob complaints        HPI: 29 y.o.  at 26w6d presents for prenatal care    Last OB US growth (since 2023)       None          Vitals:    24 1313   BP: 110/72   Weight: 101 kg (223 lb)     Total weight gain for pregnancy:  4.082 kg (9 lb)    Review of systems:   Gen: negative  CV:     negative  GI: negative  :   negative and good fetal movement noted   MS:    negative  Neuro: negative  Pul: negative    A/P  1. Intrauterine pregnancy at 26w6d   2. Pregnancy Risk:  HIGH RISK    Diagnoses and all orders for this visit:    1. Encounter for prenatal care in second trimester of first pregnancy (Primary)    2. Screening for iron deficiency anemia  -     Gestational Screen 1 Hr ([facility])  -     POC Urinalysis Dipstick  -     CBC (No Diff)  -     RPR, Rfx Qn RPR / Confirm TP ([facility])    3. Screening for diabetes mellitus  -     Gestational Screen 1 Hr ([facility])  -     POC Urinalysis Dipstick  -     CBC (No Diff)  -     RPR, Rfx Qn RPR / Confirm TP ([facility])    4. Encounter for administration of vaccine  -     Gestational Screen 1 Hr ([facility])  -     POC Urinalysis Dipstick  -     CBC (No Diff)  -     RPR, Rfx Qn RPR / Confirm TP ([facility])    5. Screening for blood or protein in urine  -     Gestational Screen 1 Hr ([facility])  -     POC Urinalysis Dipstick  -     CBC (No Diff)  -     RPR, Rfx Qn RPR / Confirm TP ([facility])    6. Antiphospholipid antibody syndrome complicating pregnancy- cardiolipin AB +  -     Anti-DNA Antibody, Double-stranded    7. Intrahepatic cholestasis of pregnancy in second trimester  -     Bile Acids, Total    8. Obesity in pregnancy, antepartum    9. Systemic lupus erythematosus- + anticardio AB, neg SS-A/SS-B  -     C4+C3  -     CBC (No Diff)  -     Comprehensive Metabolic Panel  -     Lactate Dehydrogenase  -     Protein / Creatinine Ratio, Urine - Urine, Clean Catch    10.  Maternal anemia in pregnancy, antepartum    11. Anxiety    Other orders  -     Tdap Vaccine Greater Than or Equal To 6yo IM        Pre-eclampsia symptoms were discussed and warnings were given.   labor was discussed.  Warnings were provided.  Nutrition and weight gain were addressed.  -----------------------  PLAN:   Return in about 1 week (around 2024), or ob check and BPP.  1 hour glucose and Tdap today  Will check PIH labs  Lupus check complement labs  Growth ultrasound week after next with M  Start weekly BPP's next week    Tammy Hollins MD  2024 17:13 EDT

## 2024-05-17 NOTE — TELEPHONE ENCOUNTER
Caller: Carina Galindo    Relationship: Self    Best call back number:  [phone]    What is the best time to reach you: ANY    Who are you requesting to speak with (clinical staff, provider,  specific staff member): UNK    Do you know the name of the person who called: SONNYK    What was the call regarding: SHE STATED SOMEONE CALLED HER AT 4:16 YESTERDAY ADV SHE WOULD NOT BE HAVING U/S TODAY, BUT IT IS STILL SHOWING???  NA AT FRONT - PLS CALL PT TO VERIFY    Is it okay if the provider responds through MyChart: YES

## 2024-05-18 LAB
CREAT UR-MCNC: 161.4 MG/DL
PROT UR-MCNC: 23.3 MG/DL
PROT/CREAT UR: 144.4 MG/G CREA (ref 0–200)

## 2024-05-20 LAB
ALBUMIN SERPL-MCNC: 3.6 G/DL (ref 3.9–4.9)
ALBUMIN/GLOB SERPL: 1.3 {RATIO} (ref 1.2–2.2)
ALP SERPL-CCNC: 141 IU/L (ref 44–121)
ALT SERPL-CCNC: 13 IU/L (ref 0–32)
AST SERPL-CCNC: 14 IU/L (ref 0–40)
BILE AC SERPL-SCNC: 17 UMOL/L (ref 0–10)
BILIRUB SERPL-MCNC: 0.3 MG/DL (ref 0–1.2)
BUN SERPL-MCNC: 4 MG/DL (ref 6–20)
BUN/CREAT SERPL: 8 (ref 9–23)
C3 SERPL-MCNC: 142 MG/DL (ref 82–167)
C4 SERPL-MCNC: 21 MG/DL (ref 12–38)
CALCIUM SERPL-MCNC: 8.7 MG/DL (ref 8.7–10.2)
CHLORIDE SERPL-SCNC: 104 MMOL/L (ref 96–106)
CO2 SERPL-SCNC: 18 MMOL/L (ref 20–29)
CREAT SERPL-MCNC: 0.48 MG/DL (ref 0.57–1)
DSDNA AB SER-ACNC: 3 IU/ML (ref 0–9)
EGFRCR SERPLBLD CKD-EPI 2021: 130 ML/MIN/1.73
ERYTHROCYTE [DISTWIDTH] IN BLOOD BY AUTOMATED COUNT: 11.9 % (ref 11.7–15.4)
GLOBULIN SER CALC-MCNC: 2.7 G/DL (ref 1.5–4.5)
GLUCOSE 1H P 50 G GLC PO SERPL-MCNC: 142 MG/DL (ref 70–139)
GLUCOSE SERPL-MCNC: 147 MG/DL (ref 70–99)
HCT VFR BLD AUTO: 34.6 % (ref 34–46.6)
HGB BLD-MCNC: 11.8 G/DL (ref 11.1–15.9)
LDH SERPL L TO P-CCNC: 182 IU/L (ref 119–226)
MCH RBC QN AUTO: 30.3 PG (ref 26.6–33)
MCHC RBC AUTO-ENTMCNC: 34.1 G/DL (ref 31.5–35.7)
MCV RBC AUTO: 89 FL (ref 79–97)
PLATELET # BLD AUTO: 314 X10E3/UL (ref 150–450)
POTASSIUM SERPL-SCNC: 3.5 MMOL/L (ref 3.5–5.2)
PROT SERPL-MCNC: 6.3 G/DL (ref 6–8.5)
RBC # BLD AUTO: 3.9 X10E6/UL (ref 3.77–5.28)
RPR SER QL: NON REACTIVE
SODIUM SERPL-SCNC: 138 MMOL/L (ref 134–144)
WBC # BLD AUTO: 12.8 X10E3/UL (ref 3.4–10.8)

## 2024-05-21 PROBLEM — O99.810 ABNORMAL GLUCOSE TOLERANCE TEST (GTT) DURING PREGNANCY, ANTEPARTUM: Status: ACTIVE | Noted: 2024-05-21

## 2024-05-23 ENCOUNTER — LAB (OUTPATIENT)
Dept: OBSTETRICS AND GYNECOLOGY | Age: 29
End: 2024-05-23
Payer: COMMERCIAL

## 2024-05-23 DIAGNOSIS — O99.810 ABNORMAL GLUCOSE IN PREGNANCY, ANTEPARTUM: Primary | ICD-10-CM

## 2024-05-24 ENCOUNTER — ROUTINE PRENATAL (OUTPATIENT)
Dept: OBSTETRICS AND GYNECOLOGY | Age: 29
End: 2024-05-24
Payer: COMMERCIAL

## 2024-05-24 VITALS — DIASTOLIC BLOOD PRESSURE: 68 MMHG | SYSTOLIC BLOOD PRESSURE: 110 MMHG | BODY MASS INDEX: 38.11 KG/M2 | WEIGHT: 222 LBS

## 2024-05-24 DIAGNOSIS — D68.61 ANTIPHOSPHOLIPID ANTIBODY SYNDROME COMPLICATING PREGNANCY: ICD-10-CM

## 2024-05-24 DIAGNOSIS — O99.891 SYSTEMIC LUPUS ERYTHEMATOSUS, MATERNAL, ANTEPARTUM: ICD-10-CM

## 2024-05-24 DIAGNOSIS — Z34.02 ENCOUNTER FOR PRENATAL CARE IN SECOND TRIMESTER OF FIRST PREGNANCY: Primary | ICD-10-CM

## 2024-05-24 DIAGNOSIS — O99.019 MATERNAL ANEMIA IN PREGNANCY, ANTEPARTUM: ICD-10-CM

## 2024-05-24 DIAGNOSIS — O26.642 INTRAHEPATIC CHOLESTASIS OF PREGNANCY IN SECOND TRIMESTER: ICD-10-CM

## 2024-05-24 DIAGNOSIS — M32.9 SYSTEMIC LUPUS ERYTHEMATOSUS, MATERNAL, ANTEPARTUM: ICD-10-CM

## 2024-05-24 DIAGNOSIS — F41.9 ANXIETY: ICD-10-CM

## 2024-05-24 DIAGNOSIS — O99.810 ABNORMAL GLUCOSE TOLERANCE TEST (GTT) DURING PREGNANCY, ANTEPARTUM: ICD-10-CM

## 2024-05-24 DIAGNOSIS — O99.119 ANTIPHOSPHOLIPID ANTIBODY SYNDROME COMPLICATING PREGNANCY: ICD-10-CM

## 2024-05-24 DIAGNOSIS — Z13.89 SCREENING FOR BLOOD OR PROTEIN IN URINE: ICD-10-CM

## 2024-05-24 DIAGNOSIS — O99.210 OBESITY IN PREGNANCY, ANTEPARTUM: ICD-10-CM

## 2024-05-24 LAB
BILIRUB BLD-MCNC: ABNORMAL MG/DL
GLUCOSE 1H P 100 G GLC PO SERPL-MCNC: 156 MG/DL (ref 70–179)
GLUCOSE 2H P 100 G GLC PO SERPL-MCNC: 91 MG/DL (ref 70–154)
GLUCOSE 3H P 100 G GLC PO SERPL-MCNC: 93 MG/DL (ref 70–139)
GLUCOSE P FAST SERPL-MCNC: 77 MG/DL (ref 70–94)
GLUCOSE UR STRIP-MCNC: NEGATIVE MG/DL
KETONES UR QL: NEGATIVE
LEUKOCYTE EST, POC: ABNORMAL
Lab: NORMAL
NITRITE UR-MCNC: NEGATIVE MG/ML
PH UR: 7 [PH] (ref 5–8)
PROT UR STRIP-MCNC: ABNORMAL MG/DL
RBC # UR STRIP: NEGATIVE /UL
SP GR UR: 1.02 (ref 1–1.03)
UROBILINOGEN UR QL: NORMAL

## 2024-05-24 NOTE — PROGRESS NOTES
Chief Complaint   Patient presents with    Routine Prenatal Visit     Cc: ob check with BPP Us today , normal 3 hr gtt , pt reports good FM no complaints today        HPI: 29 y.o.  at 27w6d presents for prenatal care    Last OB US growth (since 2024)       None          Vitals:    24 0923   BP: 110/68   Weight: 101 kg (222 lb)     Total weight gain for pregnancy:  3.629 kg (8 lb)    Review of systems:   Gen: negative  CV:     negative  GI: negative  :   negative and good fetal movement noted   MS:    negative  Neuro: negative  Pul: negative    Physical Exam  Vitals and nursing note reviewed.   Constitutional:       Appearance: Normal appearance.   Pulmonary:      Effort: Pulmonary effort is normal.   Neurological:      Mental Status: She is alert.   Psychiatric:         Mood and Affect: Mood normal.         Thought Content: Thought content normal.         Judgment: Judgment normal.           A/P  1. Intrauterine pregnancy at 27w6d   2. Pregnancy Risk:  HIGH RISK    Diagnoses and all orders for this visit:    1. Encounter for prenatal care in second trimester of first pregnancy (Primary)    2. Screening for blood or protein in urine  -     POC Urinalysis Dipstick    3. Antiphospholipid antibody syndrome complicating pregnancy- cardiolipin AB +    4. Abnormal glucose tolerance test (GTT) during pregnancy, antepartum  Overview:  3-hour glucose test within normal limits      5. Intrahepatic cholestasis of pregnancy in second trimester    6. Systemic lupus erythematosus- + anticardio AB, neg SS-A/SS-B    7. Obesity in pregnancy, antepartum    8. Maternal anemia in pregnancy, antepartum    9. Anxiety        Nutrition and weight gain were addressed.  -----------------------  PLAN:   Return in about 1 week (around 2024), or ob check and BPP.  Abnormal one-hour-normal 3-hour glucose test  Cholestasis-P today 8 out of 8  Antiphospholipid-continue Lovenox  Lupus-continue Plaquenil    Tammy Hollins,  MD  5/24/2024 09:34 EDT

## 2024-05-28 ENCOUNTER — HOSPITAL ENCOUNTER (OUTPATIENT)
Dept: ULTRASOUND IMAGING | Facility: HOSPITAL | Age: 29
Discharge: HOME | End: 2024-05-28
Admitting: OBSTETRICS & GYNECOLOGY
Payer: COMMERCIAL

## 2024-05-28 ENCOUNTER — OFFICE VISIT (OUTPATIENT)
Dept: OBSTETRICS AND GYNECOLOGY | Facility: CLINIC | Age: 29
End: 2024-05-28
Payer: COMMERCIAL

## 2024-05-28 VITALS
WEIGHT: 223 LBS | SYSTOLIC BLOOD PRESSURE: 111 MMHG | HEART RATE: 86 BPM | TEMPERATURE: 98.9 F | BODY MASS INDEX: 38.07 KG/M2 | DIASTOLIC BLOOD PRESSURE: 81 MMHG | HEIGHT: 64 IN

## 2024-05-28 DIAGNOSIS — O99.119 ANTIPHOSPHOLIPID ANTIBODY SYNDROME COMPLICATING PREGNANCY: ICD-10-CM

## 2024-05-28 DIAGNOSIS — M32.9 SYSTEMIC LUPUS ERYTHEMATOSUS, MATERNAL, ANTEPARTUM: ICD-10-CM

## 2024-05-28 DIAGNOSIS — O26.642 INTRAHEPATIC CHOLESTASIS OF PREGNANCY IN SECOND TRIMESTER: ICD-10-CM

## 2024-05-28 DIAGNOSIS — D68.61 ANTIPHOSPHOLIPID ANTIBODY SYNDROME COMPLICATING PREGNANCY: ICD-10-CM

## 2024-05-28 DIAGNOSIS — M32.9 SYSTEMIC LUPUS ERYTHEMATOSUS, MATERNAL, ANTEPARTUM: Primary | ICD-10-CM

## 2024-05-28 DIAGNOSIS — O99.891 SYSTEMIC LUPUS ERYTHEMATOSUS, MATERNAL, ANTEPARTUM: Primary | ICD-10-CM

## 2024-05-28 DIAGNOSIS — O99.891 SYSTEMIC LUPUS ERYTHEMATOSUS, MATERNAL, ANTEPARTUM: ICD-10-CM

## 2024-05-28 PROCEDURE — 76816 OB US FOLLOW-UP PER FETUS: CPT

## 2024-05-28 PROCEDURE — 76819 FETAL BIOPHYS PROFIL W/O NST: CPT

## 2024-05-28 RX ORDER — IRON POLYSACCHARIDE COMPLEX 180 MG
CAPSULE ORAL
COMMUNITY

## 2024-05-28 RX ORDER — FAMOTIDINE 20 MG/1
20 TABLET, FILM COATED ORAL DAILY
Qty: 90 TABLET | Refills: 3 | Status: SHIPPED | OUTPATIENT
Start: 2024-05-28 | End: 2025-05-28

## 2024-05-28 NOTE — PROGRESS NOTES
MATERNAL FETAL MEDICINE Consult Note    Dear Dr Pauline Clements MD:    Thank you for your kind referral of Carina Galindo.  As you know, she is a 29 y.o.   at  28 3/7 weeks gestation (Estimated Date of Delivery: 24). This is a consult.      Her antepartum course is complicated by:  SLE  APLAS  Cholestasis    Aneuploidy Screening: low risk    HPI: Today, she denies headache, blurry vision, RUQ pain. No vaginal bleeding, no contractions.     Review of History:  Past Medical History:   Diagnosis Date    Antiphospholipid syndrome     Anxiety     exercise induced asthma     GERD (gastroesophageal reflux disease)     Iron deficiency anemia     Knee meniscus pain, left     Liver cyst     BX AND FOUND TO BE NEGATIVE    Lupus     OCD (obsessive compulsive disorder)     Rheumatoid factor positive      Past Surgical History:   Procedure Laterality Date    ADENOIDECTOMY      CHOLECYSTECTOMY WITH INTRAOPERATIVE CHOLANGIOGRAM N/A 2016    Procedure: CHOLECYSTECTOMY LAPAROSCOPIC INTRAOPERATIVE CHOLANGIOGRAM;  Surgeon: Malathi Lozano MD;  Location: [facility] OR [facility] [place];  Service:     COLONOSCOPY  2010    ENDOSCOPY  2013    Gastritis     KNEE ARTHROSCOPY Left 3/28/2023    Procedure: Knee Arthroscopy with lateral release, meniscectomy and cyst excision;  Surgeon: Tapan Craig MD;  Location: [facility] OR [facility] [place];  Service: Orthopedics;  Laterality: Left;    NASAL SEPTUM SURGERY      SHOULDER SURGERY Right 2016    TONSILLECTOMY      x 2 due to regrowth    UPPER GASTROINTESTINAL ENDOSCOPY  2010    WISDOM TOOTH EXTRACTION         Social History     Socioeconomic History    Marital status:     Number of children: 0   Tobacco Use    Smoking status: Never     Passive exposure: Never    Smokeless tobacco: Never   Vaping Use    Vaping status: Never Used   Substance and Sexual Activity    Alcohol use: No    Drug use: Never    Sexual activity: Yes     Partners: Male     Birth control/protection: None     Family History    Problem Relation Age of Onset    Diabetes Father     Gallbladder disease Mother 30    Rheum arthritis Mother     Hypertension Mother     Endometriosis Mother     Irritable bowel syndrome Mother     Pancreatitis Mother     Breast cancer Maternal Grandmother     Heart disease Maternal Grandfather     Breast cancer Maternal Aunt     Congenital heart disease Cousin     Elissa Hyperthermia Neg Hx     Deep vein thrombosis Neg Hx     Pulmonary embolism Neg Hx       Allergies   Allergen Reactions    Egg-Derived Products Swelling     Eyes swell shut, has some eggs cooked in food.    Other Other (See Comments)     FOOD ALLERGIES: see list : peanuts and ranch dressing      Shellfish-Derived Products Anaphylaxis    Nitrofurantoin Unknown (See Comments) and Rash     Unsure    Unsure    Peanut Butter Flavor [Flavoring Agent] Hives    Ciprofloxacin Rash    Erythromycin Rash     As a baby, no interaction since    Ibuprofen Rash     Has tolerated low dose in the past    Multihance [Gadobenate] Hives     Patient experienced one small hive on neck. Was instructed to be pre-medicated per radiologist prior to MRI's for future.     Sulfa Antibiotics Rash    Triamcinolone Rash      Current Outpatient Medications on File Prior to Visit   Medication Sig Dispense Refill    acetaminophen (TYLENOL) 325 MG tablet Take 1 tablet by mouth Every 6 (Six) Hours As Needed.      albuterol (VENTOLIN HFA) 108 (90 Base) MCG/ACT inhaler Inhale 2 puffs Every 4 (Four) Hours As Needed for Wheezing. 18 g 1    Alcohol Swabs 70 % pads Use 1 Swab Daily. 100 each 11    aspirin (ASPIR) 81 MG EC tablet Take one tablet by mouth daily until 36 weeks gestation. 90 tablet 3    diphenhydrAMINE (BENADRYL) 25 mg capsule Take 1 capsule by mouth Every 6 (Six) Hours As Needed for Itching.      doxylamine-pyridoxine ER (Bonjesta) 20-20 MG tablet controlled-release tablet Take 1 tablet by mouth 2 (Two) Times a Day. 60 tablet 0    Enoxaparin Sodium (LOVENOX) 40 MG/0.4ML  "solution prefilled syringe syringe Inject 0.4 mL under the skin into the appropriate area as directed Daily. 12 mL 11    EPINEPHrine (EPIPEN) 0.3 MG/0.3ML solution auto-injector injection       hydroxychloroquine (PLAQUENIL) 200 MG tablet Take 1 tablet by mouth Daily.      Polysaccharide Iron Complex (ProFe) 391.3 (180 Fe) MG capsule Take  by mouth.      Prenatal Vit-Fe Fumarate-FA (prenatal vitamin 27-0.8) 27-0.8 MG tablet tablet Take  by mouth Daily.      sertraline (ZOLOFT) 50 MG tablet       ursodiol (ACTIGALL) 500 MG tablet Take 1 tablet by mouth 3 (Three) Times a Day. 90 tablet 11    ferrous sulfate 325 (65 FE) MG tablet Take 1 tablet by mouth Daily With Breakfast. (Patient not taking: Reported on 5/28/2024) 90 tablet 2     No current facility-administered medications on file prior to visit.        Past obstetric, gynecological, medical, surgical, family and social history reviewed.  Relevant lab work and imaging reviewed.    Review of systems  Constitutional:  denies fever, chills, malaise.   ENT/Mouth:  denies sore throat, tinnitus  Eyes: denies vision changes/pain  CV:  denies chest pain  Respiratory:  denies cough/SOB  GI:  denies N/V, diarrhea, abdominal pain.    :   denies dysuria  Skin:  denies lesions or pruritus   Neuro:  denies weakness, focal neurologic symptoms    Vitals:    05/28/24 1345   BP: 111/81   BP Location: Right arm   Patient Position: Sitting   Pulse: 86   Temp: 98.9 °F (37.2 °C)   TempSrc: Temporal   Weight: 101 kg (223 lb)   Height: 162.6 cm (64\")       PHYSICAL EXAM   GENERAL: Not in acute distress, AAOx3, pleasant  CARDIO: regular rate and rhythm  PULM: symmetric chest rise, speaking in complete sentences without difficulty  NEURO: awake, alert and oriented to person, place, and time  ABDOMINAL: No fundal tenderness, no rebound or guarding, gravid  EXTREMITIES: no bilateral lower extremity edema/tenderness  SKIN: Warm, well-perfused      ULTRASOUND   Please view full ultrasound " note on Imaging tab in ViewPoint.  Cephalic presentation.  Anterior placenta.  SHARI 18 cm, which is normal.   EFW 1458 g (73%, AC 80%)  BPP     ASSESSMENT/COUNSELIN y.o.   at  28 3/7 weeks gestation (Estimated Date of Delivery: 24).     -Pregnancy  [ X ] stable  [   ] improving [  ] worsening    Diagnoses and all orders for this visit:    1. Systemic lupus erythematosus- + anticardio AB, neg SS-A/SS-B (Primary)    2. Antiphospholipid antibody syndrome complicating pregnancy  -     Ambulatory Referral to Lactation Services    3. Intrahepatic cholestasis of pregnancy in second trimester    Other orders  -     famotidine (Pepcid) 20 MG tablet; Take 1 tablet by mouth Daily.  Dispense: 90 tablet; Refill: 3        SLE:  Previously counseled.  Patient doesn't have an official dx of SLE, but began having joint symptoms with swelling right before she got pregnant.  Her mother has a diagnosis of RA.  She has seen rheumatology and met some but not all criteria for Lupus.  However, she is APLAS positive.  Given her symptoms/constellation of findings, she likely has Lupus or a similar autoimmune issue with similar risk factors during pregnancy, so we will treat her like lupus.  She is on plaquenil and doing well on 200 a day.  Her flares involve her joints and she has never had cardiac, renal, or neuro manifestations.  Her baseline P/C is 116 and BL Cr is 0.61 prepregnancy.  Platelets 431 earlier this year.  She was negative for Ro/La antibodies earlier this year.        I recommend she continue following with her rheumatologist as they recommend and have complement levels and Anti-DS DNA levels each trimester. These will be helpful for comparison if she develops a flare or concern for preeclampsia during this pregnancy.  She will need serial growth exams and twice weekly ANFS starting by 32 weeks between me and her primary OB.      PIH labs appropriate.     Cholestasis:  Previously counseled.      The rate of  stillbirth after 37 weeks at baseline is 0.1-0.3% in the [place], but for cholestasis, the incidence is approximately 1-2%, thus I recommend delivery at 37 weeks.   In a prospective cohort study evaluating patients affected by ICP with total bile acid levels of 40 mmol/L and higher, it was found that the aOR for stillbirth was 2.58 for adverse fetal outcome when compared with the baseline data of patients without cholestasis.    The pathophysiology of stillbirth is poorly understood but is likely related to fetal arrythmia or vasospasm of placental vessels.  There is also evidence of increased risk of pre-eclampsia positively correlated with bile acid level.  Women with bile acids >40 are at highest risk with dx of pre-eclampsia often occurring 2-4 weeks after diagnosis of cholestasis.      I recommend 2x per week  fetal surveillance for this diagnosis starting by 32 weeks--we will see her weekly at 28 (definitely 2x per week if bile acids over 40).      Delivery between 34 and 36 weeks of gestation can be considered in women with ICP, with total bile acid levels of100 mmol/L, and with any of the followin) excruciating/ unremitting maternal pruritus not  relieved with ursodiol  2)Hx of stillbirth due to ICP  3)Evidence of worsening hepatic function    Discussed risks fetal problems due to Cholestasis including fetal arrhthmias and even fetal death.  Therefore the recommendation is close (biweekly) fetal monitoring.  Follow      APLAS with no personal history of VTE  I discussed with the patient her diagnosis of APLAS, which is based on lupus and her positive lab tests.  We reviewed the risks of pregnancy with APLAS including but not limited to miscarriage, stillbirth, fetal growth restriction, preeclampsia, indicated  birth, and venous thromboembolic event.    We discussed decreasing the risk of these complications with LMWH and baby aspirin throughout pregnancy and for 4-6 weeks post partum.  We  also recommend serial growth ultrasound and ANFS twice weekly beyond 32 weeks.    Summary of Plan  -Serial growth ultrasounds every 4 weeks (MFM)   -Starting at 32 weeks: twice Weekly fetal  surveillance until delivery--we will do NSTs here on  and BPP's at Dr. Hollins's office on .  We will switch the NST for a growth every 4 weeks.    -Continue ursodiol and watch bile acids monthly along with PIH labs, DS DNA, complement  -Continue ASA and Lovenox  -Continue plaquenil and following with rheum  -Delivery 37 weeks for APLAS, anticoagulation, lupus, cholestasis    Follow-up: weekly    Thank you for the consult and opportunity to care for this patient.  Please feel free to reach out with any questions or concerns.      I spent 17 minutes caring for this patient on this date of service. This time includes time spent by me in the following activities: preparing for the visit, reviewing tests, obtaining and/or reviewing a separately obtained history, performing a medically appropriate examination and/or evaluation, counseling and educating the patient/family/caregiver and independently interpreting results and communicating that information with the patient/family/caregiver with greater than 50% spent in counseling and coordination of care.       I spent 4 minutes on the separately reported service of US imaging not included in the time used to support the E/M service also reported today.      Beverley Patino MD FACOG  Maternal Fetal Medicine-[facility]  Office: [phone]  [email or web address]

## 2024-05-28 NOTE — LETTER
May 28, 2024       No Recipients    Patient: Carina Galindo   YOB: 1995   Date of Visit: 2024       Dear Tammy Hollins MD    Carina Galindo was in my office today. Below is a copy of my note.    If you have questions, please do not hesitate to call me. I look forward to following Carina along with you.         Sincerely,        Beverley Patino MD      MATERNAL FETAL MEDICINE Consult Note    Dear Dr Pauline Clements MD:    Thank you for your kind referral of Carina Galindo.  As you know, she is a 29 y.o.   at  28 3/7 weeks gestation (Estimated Date of Delivery: 24). This is a consult.      Her antepartum course is complicated by:  SLE  APLAS  Cholestasis    Aneuploidy Screening: low risk    HPI: Today, she denies headache, blurry vision, RUQ pain. No vaginal bleeding, no contractions.     Review of History:  Past Medical History:   Diagnosis Date   • Antiphospholipid syndrome    • Anxiety    • exercise induced asthma    • GERD (gastroesophageal reflux disease)    • Iron deficiency anemia    • Knee meniscus pain, left    • Liver cyst     BX AND FOUND TO BE NEGATIVE   • Lupus    • OCD (obsessive compulsive disorder)    • Rheumatoid factor positive      Past Surgical History:   Procedure Laterality Date   • ADENOIDECTOMY     • CHOLECYSTECTOMY WITH INTRAOPERATIVE CHOLANGIOGRAM N/A 2016    Procedure: CHOLECYSTECTOMY LAPAROSCOPIC INTRAOPERATIVE CHOLANGIOGRAM;  Surgeon: Malathi Lozano MD;  [place] [facility] [place] [facility] [place]:    • COLONOSCOPY     • ENDOSCOPY  2013    Gastritis    • KNEE ARTHROSCOPY Left 3/28/2023    Procedure: Knee Arthroscopy with lateral release, meniscectomy and cyst excision;  Surgeon: Tapan Craig MD;  [place] [facility] [place] [facility] [place]  Service: Orthopedics;  Laterality: Left;   • NASAL SEPTUM SURGERY     • SHOULDER SURGERY Right    • TONSILLECTOMY      x 2 due to regrowth   • UPPER GASTROINTESTINAL ENDOSCOPY     • WISDOM TOOTH EXTRACTION          Social History     Socioeconomic History   • Marital status:    • Number of children: 0   Tobacco Use   • Smoking status: Never     Passive exposure: Never   • Smokeless tobacco: Never   Vaping Use   • Vaping status: Never Used   Substance and Sexual Activity   • Alcohol use: No   • Drug use: Never   • Sexual activity: Yes     Partners: Male     Birth control/protection: None     Family History   Problem Relation Age of Onset   • Diabetes Father    • Gallbladder disease Mother 30   • Rheum arthritis Mother    • Hypertension Mother    • Endometriosis Mother    • Irritable bowel syndrome Mother    • Pancreatitis Mother    • Breast cancer Maternal Grandmother    • Heart disease Maternal Grandfather    • Breast cancer Maternal Aunt    • Congenital heart disease Cousin    • Malig Hyperthermia Neg Hx    • Deep vein thrombosis Neg Hx    • Pulmonary embolism Neg Hx       Allergies   Allergen Reactions   • Egg-Derived Products Swelling     Eyes swell shut, has some eggs cooked in food.   • Other Other (See Comments)     FOOD ALLERGIES: see list : peanuts and ranch dressing     • Shellfish-Derived Products Anaphylaxis   • Nitrofurantoin Unknown (See Comments) and Rash     Unsure    Unsure   • Peanut Butter Flavor [Flavoring Agent] Hives   • Ciprofloxacin Rash   • Erythromycin Rash     As a baby, no interaction since   • Ibuprofen Rash     Has tolerated low dose in the past   • Multihance [Gadobenate] Hives     Patient experienced one small hive on neck. Was instructed to be pre-medicated per radiologist prior to MRI's for future.    • Sulfa Antibiotics Rash   • Triamcinolone Rash      Current Outpatient Medications on File Prior to Visit   Medication Sig Dispense Refill   • acetaminophen (TYLENOL) 325 MG tablet Take 1 tablet by mouth Every 6 (Six) Hours As Needed.     • albuterol (VENTOLIN HFA) 108 (90 Base) MCG/ACT inhaler Inhale 2 puffs Every 4 (Four) Hours As Needed for Wheezing. 18 g 1   • Alcohol Swabs 70 %  "pads Use 1 Swab Daily. 100 each 11   • aspirin (ASPIR) 81 MG EC tablet Take one tablet by mouth daily until 36 weeks gestation. 90 tablet 3   • diphenhydrAMINE (BENADRYL) 25 mg capsule Take 1 capsule by mouth Every 6 (Six) Hours As Needed for Itching.     • doxylamine-pyridoxine ER (Bonjesta) 20-20 MG tablet controlled-release tablet Take 1 tablet by mouth 2 (Two) Times a Day. 60 tablet 0   • Enoxaparin Sodium (LOVENOX) 40 MG/0.4ML solution prefilled syringe syringe Inject 0.4 mL under the skin into the appropriate area as directed Daily. 12 mL 11   • EPINEPHrine (EPIPEN) 0.3 MG/0.3ML solution auto-injector injection      • hydroxychloroquine (PLAQUENIL) 200 MG tablet Take 1 tablet by mouth Daily.     • Polysaccharide Iron Complex (ProFe) 391.3 (180 Fe) MG capsule Take  by mouth.     • Prenatal Vit-Fe Fumarate-FA (prenatal vitamin 27-0.8) 27-0.8 MG tablet tablet Take  by mouth Daily.     • sertraline (ZOLOFT) 50 MG tablet      • ursodiol (ACTIGALL) 500 MG tablet Take 1 tablet by mouth 3 (Three) Times a Day. 90 tablet 11   • ferrous sulfate 325 (65 FE) MG tablet Take 1 tablet by mouth Daily With Breakfast. (Patient not taking: Reported on 5/28/2024) 90 tablet 2     No current facility-administered medications on file prior to visit.        Past obstetric, gynecological, medical, surgical, family and social history reviewed.  Relevant lab work and imaging reviewed.    Review of systems  Constitutional:  denies fever, chills, malaise.   ENT/Mouth:  denies sore throat, tinnitus  Eyes: denies vision changes/pain  CV:  denies chest pain  Respiratory:  denies cough/SOB  GI:  denies N/V, diarrhea, abdominal pain.    :   denies dysuria  Skin:  denies lesions or pruritus   Neuro:  denies weakness, focal neurologic symptoms    Vitals:    05/28/24 1345   BP: 111/81   BP Location: Right arm   Patient Position: Sitting   Pulse: 86   Temp: 98.9 °F (37.2 °C)   TempSrc: Temporal   Weight: 101 kg (223 lb)   Height: 162.6 cm (64\") "       PHYSICAL EXAM   GENERAL: Not in acute distress, AAOx3, pleasant  CARDIO: regular rate and rhythm  PULM: symmetric chest rise, speaking in complete sentences without difficulty  NEURO: awake, alert and oriented to person, place, and time  ABDOMINAL: No fundal tenderness, no rebound or guarding, gravid  EXTREMITIES: no bilateral lower extremity edema/tenderness  SKIN: Warm, well-perfused      ULTRASOUND   Please view full ultrasound note on Imaging tab in ViewPoint.  Cephalic presentation.  Anterior placenta.  SHARI 18 cm, which is normal.   EFW 1458 g (73%, AC 80%)  BPP     ASSESSMENT/COUNSELIN y.o.   at  28 3/7 weeks gestation (Estimated Date of Delivery: 24).     -Pregnancy  [ X ] stable  [   ] improving [  ] worsening    Diagnoses and all orders for this visit:    1. Systemic lupus erythematosus- + anticardio AB, neg SS-A/SS-B (Primary)    2. Antiphospholipid antibody syndrome complicating pregnancy  -     Ambulatory Referral to Lactation Services    3. Intrahepatic cholestasis of pregnancy in second trimester    Other orders  -     famotidine (Pepcid) 20 MG tablet; Take 1 tablet by mouth Daily.  Dispense: 90 tablet; Refill: 3        SLE:  Previously counseled.  Patient doesn't have an official dx of SLE, but began having joint symptoms with swelling right before she got pregnant.  Her mother has a diagnosis of RA.  She has seen rheumatology and met some but not all criteria for Lupus.  However, she is APLAS positive.  Given her symptoms/constellation of findings, she likely has Lupus or a similar autoimmune issue with similar risk factors during pregnancy, so we will treat her like lupus.  She is on plaquenil and doing well on 200 a day.  Her flares involve her joints and she has never had cardiac, renal, or neuro manifestations.  Her baseline P/C is 116 and BL Cr is 0.61 prepregnancy.  Platelets 431 earlier this year.  She was negative for Ro/La antibodies earlier this year.        I  recommend she continue following with her rheumatologist as they recommend and have complement levels and Anti-DS DNA levels each trimester. These will be helpful for comparison if she develops a flare or concern for preeclampsia during this pregnancy.  She will need serial growth exams and twice weekly ANFS starting by 32 weeks between me and her primary OB.      PIH labs appropriate.     Cholestasis:  Previously counseled.      The rate of stillbirth after 37 weeks at baseline is 0.1-0.3% in the [place], but for cholestasis, the incidence is approximately 1-2%, thus I recommend delivery at 37 weeks.   In a prospective cohort study evaluating patients affected by ICP with total bile acid levels of 40 mmol/L and higher, it was found that the aOR for stillbirth was 2.58 for adverse fetal outcome when compared with the baseline data of patients without cholestasis.    The pathophysiology of stillbirth is poorly understood but is likely related to fetal arrythmia or vasospasm of placental vessels.  There is also evidence of increased risk of pre-eclampsia positively correlated with bile acid level.  Women with bile acids >40 are at highest risk with dx of pre-eclampsia often occurring 2-4 weeks after diagnosis of cholestasis.      I recommend 2x per week  fetal surveillance for this diagnosis starting by 32 weeks--we will see her weekly at 28 (definitely 2x per week if bile acids over 40).      Delivery between 34 and 36 weeks of gestation can be considered in women with ICP, with total bile acid levels of100 mmol/L, and with any of the followin) excruciating/ unremitting maternal pruritus not  relieved with ursodiol  2)Hx of stillbirth due to ICP  3)Evidence of worsening hepatic function    Discussed risks fetal problems due to Cholestasis including fetal arrhthmias and even fetal death.  Therefore the recommendation is close (biweekly) fetal monitoring.  Follow      APLAS with no personal history of VTE  I  discussed with the patient her diagnosis of APLAS, which is based on lupus and her positive lab tests.  We reviewed the risks of pregnancy with APLAS including but not limited to miscarriage, stillbirth, fetal growth restriction, preeclampsia, indicated  birth, and venous thromboembolic event.    We discussed decreasing the risk of these complications with LMWH and baby aspirin throughout pregnancy and for 4-6 weeks post partum.  We also recommend serial growth ultrasound and ANFS twice weekly beyond 32 weeks.    Summary of Plan  -Serial growth ultrasounds every 4 weeks (MFM)   -Starting at 32 weeks: twice Weekly fetal  surveillance until delivery--we will do NSTs here on  and BPP's at Dr. Hollins's office on .  We will switch the NST for a growth every 4 weeks.    -Continue ursodiol and watch bile acids monthly along with PIH labs, DS DNA, complement  -Continue ASA and Lovenox  -Continue plaquenil and following with rheum  -Delivery 37 weeks for APLAS, anticoagulation, lupus, cholestasis    Follow-up: weekly    Thank you for the consult and opportunity to care for this patient.  Please feel free to reach out with any questions or concerns.      I spent 17 minutes caring for this patient on this date of service. This time includes time spent by me in the following activities: preparing for the visit, reviewing tests, obtaining and/or reviewing a separately obtained history, performing a medically appropriate examination and/or evaluation, counseling and educating the patient/family/caregiver and independently interpreting results and communicating that information with the patient/family/caregiver with greater than 50% spent in counseling and coordination of care.       I spent 4 minutes on the separately reported service of US imaging not included in the time used to support the E/M service also reported today.      Beverley Patino MD FACOG  Maternal Fetal Medicine-[facility]  [place]  Office: [phone]  [email or web address]

## 2024-05-28 NOTE — LETTER
May 28, 2024       No Recipients    Patient: Carina Galindo   YOB: 1995   Date of Visit: 5/28/2024       Dear Pauline Clements MD    Carina Galindo was in my office today. Below is a copy of my note.    If you have questions, please do not hesitate to call me. I look forward to following Carina along with you.         Sincerely,        Beverley Patino MD        CC:   No Recipients    Pt reports that she is doing well and denies vaginal bleeding, cramping, contractions or LOF at this time. Reports active fetal movement. Reviewed when to call OB office or present to L&D for evaluation with symptoms such as decreased fetal movement, vaginal bleeding, LOF or ctxs. Pt verbalized understanding. Denies HA, visual changes or epigastric pain.Patient states that she occasionally has vaginal pressure that comes and goes, was informed to call ob or go to OB ED if it becomes consistent.  Denies any additional complaints at time of appointment. Next OB appointment scheduled for 05/31/2024    There were no vitals filed for this visit.

## 2024-05-31 ENCOUNTER — ROUTINE PRENATAL (OUTPATIENT)
Dept: OBSTETRICS AND GYNECOLOGY | Age: 29
End: 2024-05-31
Payer: COMMERCIAL

## 2024-05-31 VITALS — BODY MASS INDEX: 38.45 KG/M2 | WEIGHT: 224 LBS | SYSTOLIC BLOOD PRESSURE: 110 MMHG | DIASTOLIC BLOOD PRESSURE: 52 MMHG

## 2024-05-31 DIAGNOSIS — Z13.89 SCREENING FOR BLOOD OR PROTEIN IN URINE: ICD-10-CM

## 2024-05-31 DIAGNOSIS — Z3A.28 28 WEEKS GESTATION OF PREGNANCY: Primary | ICD-10-CM

## 2024-05-31 DIAGNOSIS — R10.11 RIGHT UPPER QUADRANT PAIN: ICD-10-CM

## 2024-05-31 DIAGNOSIS — O99.891 SYSTEMIC LUPUS ERYTHEMATOSUS, MATERNAL, ANTEPARTUM: ICD-10-CM

## 2024-05-31 DIAGNOSIS — M32.9 SYSTEMIC LUPUS ERYTHEMATOSUS, MATERNAL, ANTEPARTUM: ICD-10-CM

## 2024-05-31 DIAGNOSIS — D68.61 ANTIPHOSPHOLIPID ANTIBODY SYNDROME COMPLICATING PREGNANCY: ICD-10-CM

## 2024-05-31 DIAGNOSIS — O26.642 INTRAHEPATIC CHOLESTASIS OF PREGNANCY IN SECOND TRIMESTER: ICD-10-CM

## 2024-05-31 DIAGNOSIS — O99.119 ANTIPHOSPHOLIPID ANTIBODY SYNDROME COMPLICATING PREGNANCY: ICD-10-CM

## 2024-05-31 DIAGNOSIS — O99.019 MATERNAL ANEMIA IN PREGNANCY, ANTEPARTUM: ICD-10-CM

## 2024-05-31 DIAGNOSIS — O99.810 ABNORMAL GLUCOSE TOLERANCE TEST (GTT) DURING PREGNANCY, ANTEPARTUM: ICD-10-CM

## 2024-05-31 LAB
BILIRUB BLD-MCNC: NEGATIVE MG/DL
CLARITY, POC: CLEAR
COLOR UR: YELLOW
GLUCOSE UR STRIP-MCNC: NEGATIVE MG/DL
KETONES UR QL: NEGATIVE
LEUKOCYTE EST, POC: ABNORMAL
NITRITE UR-MCNC: NEGATIVE MG/ML
PH UR: 7.5 [PH] (ref 5–8)
PROT UR STRIP-MCNC: NEGATIVE MG/DL
RBC # UR STRIP: NEGATIVE /UL
SP GR UR: 1.02 (ref 1–1.03)
UROBILINOGEN UR QL: NORMAL

## 2024-05-31 NOTE — PROGRESS NOTES
Chief Complaint   Patient presents with    Routine Prenatal Visit     Ob check, 28w6d, bpp today, pt c/o right upper quadrant pain that comes and goes and has been going on for three days now        HPI: 29 y.o.  at 28w6d three days ago started having some right upper quad pain   The pain last about an hour yesterday and stopped  Has had a cholecystomy in the past   Not having any issues with itching   Last visit with mfm 3 days ago  Good FM, no lof , vb or contractions    Relevant data reviewed: Office Visit with Beverley Patino MD (2024)       Last OB US growth (since 2024)       None          Vitals:    24 0839   BP: 110/52   Weight: 102 kg (224 lb)     Total weight gain for pregnancy:  4.536 kg (10 lb)    Review of systems:   Gen: negative  CV:     negative  GI: negative  :   good fetal movement noted   MS:    negative  Neuro: denies headaches and visual changes   Pul: negative    Physical Exam  Constitutional:       General: She is not in acute distress.  Pulmonary:      Effort: Pulmonary effort is normal.   Abdominal:      Palpations: Abdomen is soft.      Tenderness: There is no abdominal tenderness.   Skin:     General: Skin is warm.   Neurological:      Mental Status: She is alert.   Psychiatric:         Mood and Affect: Mood normal.         Thought Content: Thought content normal.         Judgment: Judgment normal.         A/P  1. Intrauterine pregnancy at 28w6d   2. Pregnancy Risk:  HIGH RISK    Impression:  Intrauterine pregnancy at 28w6d  Biophysical profile: Reassuring    SHARI: 15.07 cm  Good fetal movement and breathing    Diagnoses and all orders for this visit:    1. 28 weeks gestation of pregnancy (Primary)  -     US abdomen limited; Future    2. Intrahepatic cholestasis of pregnancy in second trimester  -     Bile Acids, Total  -     Protein / Creatinine Ratio, Urine - Urine, Clean Catch  -     Lactate Dehydrogenase  -     Comprehensive Metabolic Panel  -     CBC (No  Diff)  -     C4+C3  -     Anti-DNA Antibody, Double-stranded  -     US abdomen limited; Future    3. Screening for blood or protein in urine  -     POC Urinalysis Dipstick    4. Systemic lupus erythematosus- + anticardio AB, neg SS-A/SS-B    5. Abnormal glucose tolerance test (GTT) during pregnancy, antepartum  Overview:  3-hour glucose test within normal limits      6. Antiphospholipid antibody syndrome complicating pregnancy- cardiolipin AB +    7. Maternal anemia in pregnancy, antepartum    8. Right upper quadrant pain  -     US abdomen limited; Future        Pre-eclampsia symptoms were discussed and warnings were given.   labor was discussed.  Warnings were provided.  Nutrition and weight gain were addressed.  -----------------------  PLAN: will check labs today  Discussed if pain starts back needs to go to l/d over the weekend  Has follow up with MFM on Tuesday   Outpatient us   Watch diet  No follow-ups on file.    Tammy Figueroa, APRN  2024 11:46 EDT

## 2024-06-01 LAB
CREAT UR-MCNC: 65.7 MG/DL
PROT UR-MCNC: 9.8 MG/DL
PROT/CREAT UR: 149.2 MG/G CREA (ref 0–200)

## 2024-06-03 ENCOUNTER — TELEPHONE (OUTPATIENT)
Dept: OBSTETRICS AND GYNECOLOGY | Facility: CLINIC | Age: 29
End: 2024-06-03
Payer: COMMERCIAL

## 2024-06-03 ENCOUNTER — TELEPHONE (OUTPATIENT)
Dept: OBSTETRICS AND GYNECOLOGY | Age: 29
End: 2024-06-03
Payer: COMMERCIAL

## 2024-06-03 LAB
ALBUMIN SERPL-MCNC: 3.3 G/DL (ref 3.9–4.9)
ALBUMIN/GLOB SERPL: 1.3 {RATIO} (ref 1.2–2.2)
ALP SERPL-CCNC: 152 IU/L (ref 44–121)
ALT SERPL-CCNC: 27 IU/L (ref 0–32)
AST SERPL-CCNC: 21 IU/L (ref 0–40)
BILE AC SERPL-SCNC: 33.7 UMOL/L (ref 0–10)
BILIRUB SERPL-MCNC: <0.2 MG/DL (ref 0–1.2)
BUN SERPL-MCNC: 4 MG/DL (ref 6–20)
BUN/CREAT SERPL: 10 (ref 9–23)
C3 SERPL-MCNC: 147 MG/DL (ref 82–167)
C4 SERPL-MCNC: 21 MG/DL (ref 12–38)
CALCIUM SERPL-MCNC: 8.6 MG/DL (ref 8.7–10.2)
CHLORIDE SERPL-SCNC: 107 MMOL/L (ref 96–106)
CO2 SERPL-SCNC: 19 MMOL/L (ref 20–29)
CREAT SERPL-MCNC: 0.42 MG/DL (ref 0.57–1)
DSDNA AB SER-ACNC: 2 IU/ML (ref 0–9)
EGFRCR SERPLBLD CKD-EPI 2021: 134 ML/MIN/1.73
ERYTHROCYTE [DISTWIDTH] IN BLOOD BY AUTOMATED COUNT: 11.7 % (ref 11.7–15.4)
GLOBULIN SER CALC-MCNC: 2.6 G/DL (ref 1.5–4.5)
GLUCOSE SERPL-MCNC: 90 MG/DL (ref 70–99)
HCT VFR BLD AUTO: 35.1 % (ref 34–46.6)
HGB BLD-MCNC: 11.6 G/DL (ref 11.1–15.9)
LDH SERPL L TO P-CCNC: 181 IU/L (ref 119–226)
MCH RBC QN AUTO: 29.4 PG (ref 26.6–33)
MCHC RBC AUTO-ENTMCNC: 33 G/DL (ref 31.5–35.7)
MCV RBC AUTO: 89 FL (ref 79–97)
PLATELET # BLD AUTO: 315 X10E3/UL (ref 150–450)
POTASSIUM SERPL-SCNC: 3.6 MMOL/L (ref 3.5–5.2)
PROT SERPL-MCNC: 5.9 G/DL (ref 6–8.5)
RBC # BLD AUTO: 3.94 X10E6/UL (ref 3.77–5.28)
SODIUM SERPL-SCNC: 139 MMOL/L (ref 134–144)
WBC # BLD AUTO: 12.6 X10E3/UL (ref 3.4–10.8)

## 2024-06-03 NOTE — TELEPHONE ENCOUNTER
Call placed to Carina to review medication regimen due to elevated bile acids. Carina reports she is taking her Actigall 500mg TID as prescribed. Pt reports itching is much better than it was- still notes some itching on chest and back. Dr. Patino notified and will discuss with patient in office tomorrow.

## 2024-06-03 NOTE — TELEPHONE ENCOUNTER
Caller: Carina Galindo    Relationship: Self    Best call back number: [phone]    What is the best time to reach you: ANY    Who are you requesting to speak with (clinical staff, provider,  specific staff member): UNK    Do you know the name of the person who called: UNK    What was the call regarding: TEST RESULTS    Is it okay if the provider responds through MyChart: YES

## 2024-06-04 ENCOUNTER — CLINICAL SUPPORT (OUTPATIENT)
Dept: OBSTETRICS AND GYNECOLOGY | Facility: CLINIC | Age: 29
End: 2024-06-04
Payer: COMMERCIAL

## 2024-06-04 ENCOUNTER — HOSPITAL ENCOUNTER (OUTPATIENT)
Facility: HOSPITAL | Age: 29
Discharge: HOME | End: 2024-06-04
Payer: COMMERCIAL

## 2024-06-04 ENCOUNTER — OFFICE VISIT (OUTPATIENT)
Dept: OBSTETRICS AND GYNECOLOGY | Facility: CLINIC | Age: 29
End: 2024-06-04
Payer: COMMERCIAL

## 2024-06-04 VITALS
DIASTOLIC BLOOD PRESSURE: 70 MMHG | TEMPERATURE: 98.7 F | WEIGHT: 223 LBS | HEART RATE: 96 BPM | BODY MASS INDEX: 38.07 KG/M2 | SYSTOLIC BLOOD PRESSURE: 109 MMHG | HEIGHT: 64 IN

## 2024-06-04 DIAGNOSIS — O99.119 ANTIPHOSPHOLIPID ANTIBODY SYNDROME COMPLICATING PREGNANCY: ICD-10-CM

## 2024-06-04 DIAGNOSIS — O99.891 SYSTEMIC LUPUS ERYTHEMATOSUS, MATERNAL, ANTEPARTUM: ICD-10-CM

## 2024-06-04 DIAGNOSIS — D68.61 ANTIPHOSPHOLIPID ANTIBODY SYNDROME COMPLICATING PREGNANCY: ICD-10-CM

## 2024-06-04 DIAGNOSIS — O26.642 INTRAHEPATIC CHOLESTASIS OF PREGNANCY IN SECOND TRIMESTER: ICD-10-CM

## 2024-06-04 DIAGNOSIS — M32.9 SYSTEMIC LUPUS ERYTHEMATOSUS, MATERNAL, ANTEPARTUM: ICD-10-CM

## 2024-06-04 DIAGNOSIS — R10.11 RIGHT UPPER QUADRANT PAIN: ICD-10-CM

## 2024-06-04 DIAGNOSIS — Z3A.28 28 WEEKS GESTATION OF PREGNANCY: ICD-10-CM

## 2024-06-04 DIAGNOSIS — O26.643 INTRAHEPATIC CHOLESTASIS OF PREGNANCY IN THIRD TRIMESTER: Primary | ICD-10-CM

## 2024-06-04 PROCEDURE — 76705 ECHO EXAM OF ABDOMEN: CPT

## 2024-06-04 PROCEDURE — 99213 OFFICE O/P EST LOW 20 MIN: CPT | Performed by: OBSTETRICS & GYNECOLOGY

## 2024-06-04 NOTE — PROGRESS NOTES
Pt reports that she is doing well and denies vaginal bleeding, cramping, contractions or LOF at this time. Reports active fetal movement. Reviewed when to call OB office or present to L&D for evaluation with symptoms such as decreased fetal movement, vaginal bleeding, LOF or ctxs. Pt verbalized understanding. Denies HA, visual changes or epigastric pain. Denies any additional complaints at time of appointment. Next OB appointment scheduled for 06/07/2024.    Vitals:    06/04/24 1413   BP: 109/70   Pulse: 96   Temp: 98.7 °F (37.1 °C)

## 2024-06-04 NOTE — LETTER
2024       No Recipients    Patient: Carina Galindo   YOB: 1995   Date of Visit: 2024       Dear Tammy Hollins MD    Carina Galindo was in my office today. Below is a copy of my note.    If you have questions, please do not hesitate to call me. I look forward to following Carina along with you.         Sincerely,        Beverley Patino MD      MATERNAL FETAL MEDICINE Consult Note    Dear Dr Tammy Hollins MD:    Thank you for your kind referral of Carina Galindo.  As you know, she is a 29 y.o.   at  29 3/7 weeks gestation (Estimated Date of Delivery: 24). This is a consult.      Her antepartum course is complicated by:  SLE  APLAS  Cholestasis    Aneuploidy Screening: low risk    HPI: Today, she denies headache, blurry vision, RUQ pain. No vaginal bleeding, no contractions.     Review of History:  Past Medical History:   Diagnosis Date   • Antiphospholipid syndrome    • Anxiety    • exercise induced asthma    • GERD (gastroesophageal reflux disease)    • Iron deficiency anemia    • Knee meniscus pain, left    • Liver cyst     BX AND FOUND TO BE NEGATIVE   • Lupus    • OCD (obsessive compulsive disorder)    • Rheumatoid factor positive      Past Surgical History:   Procedure Laterality Date   • ADENOIDECTOMY     • CHOLECYSTECTOMY WITH INTRAOPERATIVE CHOLANGIOGRAM N/A 2016    Procedure: CHOLECYSTECTOMY LAPAROSCOPIC INTRAOPERATIVE CHOLANGIOGRAM;  Surgeon: Malathi Lozano MD;  Location: [facility] OR [facility];  Service:    • COLONOSCOPY     • ENDOSCOPY  2013    Gastritis    • KNEE ARTHROSCOPY Left 3/28/2023    Procedure: Knee Arthroscopy with lateral release, meniscectomy and cyst excision;  Surgeon: Tapan Craig MD;  Location: [facility] OR [facility];  Service: Orthopedics;  Laterality: Left;   • NASAL SEPTUM SURGERY     • SHOULDER SURGERY Right    • TONSILLECTOMY      x 2 due to regrowth   • UPPER GASTROINTESTINAL ENDOSCOPY     • WISDOM TOOTH EXTRACTION          Social History     Socioeconomic History   • Marital status:    • Number of children: 0   Tobacco Use   • Smoking status: Never     Passive exposure: Never   • Smokeless tobacco: Never   Vaping Use   • Vaping status: Never Used   Substance and Sexual Activity   • Alcohol use: No   • Drug use: Never   • Sexual activity: Yes     Partners: Male     Birth control/protection: None     Family History   Problem Relation Age of Onset   • Diabetes Father    • Gallbladder disease Mother 30   • Rheum arthritis Mother    • Hypertension Mother    • Endometriosis Mother    • Irritable bowel syndrome Mother    • Pancreatitis Mother    • Breast cancer Maternal Grandmother    • Heart disease Maternal Grandfather    • Breast cancer Maternal Aunt    • Congenital heart disease Cousin    • Malig Hyperthermia Neg Hx    • Deep vein thrombosis Neg Hx    • Pulmonary embolism Neg Hx       Allergies   Allergen Reactions   • Egg-Derived Products Swelling     Eyes swell shut, has some eggs cooked in food.   • Other Other (See Comments)     FOOD ALLERGIES: see list : peanuts and ranch dressing     • Shellfish-Derived Products Anaphylaxis   • Nitrofurantoin Unknown (See Comments) and Rash     Unsure    Unsure   • Peanut Butter Flavor [Flavoring Agent] Hives   • Ciprofloxacin Rash   • Erythromycin Rash     As a baby, no interaction since   • Ibuprofen Rash     Has tolerated low dose in the past   • Multihance [Gadobenate] Hives     Patient experienced one small hive on neck. Was instructed to be pre-medicated per radiologist prior to MRI's for future.    • Sulfa Antibiotics Rash   • Triamcinolone Rash      Current Outpatient Medications on File Prior to Visit   Medication Sig Dispense Refill   • acetaminophen (TYLENOL) 325 MG tablet Take 1 tablet by mouth Every 6 (Six) Hours As Needed.     • albuterol (VENTOLIN HFA) 108 (90 Base) MCG/ACT inhaler Inhale 2 puffs Every 4 (Four) Hours As Needed for Wheezing. 18 g 1   • Alcohol Swabs 70 %  pads Use 1 Swab Daily. 100 each 11   • aspirin (ASPIR) 81 MG EC tablet Take one tablet by mouth daily until 36 weeks gestation. 90 tablet 3   • diphenhydrAMINE (BENADRYL) 25 mg capsule Take 1 capsule by mouth Every 6 (Six) Hours As Needed for Itching.     • doxylamine-pyridoxine ER (Bonjesta) 20-20 MG tablet controlled-release tablet Take 1 tablet by mouth 2 (Two) Times a Day. 60 tablet 0   • Enoxaparin Sodium (LOVENOX) 40 MG/0.4ML solution prefilled syringe syringe Inject 0.4 mL under the skin into the appropriate area as directed Daily. 12 mL 11   • EPINEPHrine (EPIPEN) 0.3 MG/0.3ML solution auto-injector injection      • famotidine (Pepcid) 20 MG tablet Take 1 tablet by mouth Daily. 90 tablet 3   • hydroxychloroquine (PLAQUENIL) 200 MG tablet Take 1 tablet by mouth Daily.     • Polysaccharide Iron Complex (ProFe) 391.3 (180 Fe) MG capsule Take  by mouth.     • Prenatal Vit-Fe Fumarate-FA (prenatal vitamin 27-0.8) 27-0.8 MG tablet tablet Take  by mouth Daily.     • sertraline (ZOLOFT) 50 MG tablet      • ursodiol (ACTIGALL) 500 MG tablet Take 1 tablet by mouth 3 (Three) Times a Day. 90 tablet 11   • ferrous sulfate 325 (65 FE) MG tablet Take 1 tablet by mouth Daily With Breakfast. (Patient not taking: Reported on 6/4/2024) 90 tablet 2     No current facility-administered medications on file prior to visit.        Past obstetric, gynecological, medical, surgical, family and social history reviewed.  Relevant lab work and imaging reviewed.    Review of systems  Constitutional:  denies fever, chills, malaise.   ENT/Mouth:  denies sore throat, tinnitus  Eyes: denies vision changes/pain  CV:  denies chest pain  Respiratory:  denies cough/SOB  GI:  denies N/V, diarrhea, abdominal pain.    :   denies dysuria  Skin:  denies lesions or pruritus   Neuro:  denies weakness, focal neurologic symptoms    Vitals:    06/04/24 1413   BP: 109/70   BP Location: Right arm   Patient Position: Sitting   Pulse: 96   Temp: 98.7 °F  "(37.1 °C)   TempSrc: Temporal   Weight: 101 kg (223 lb)   Height: 162.6 cm (64\")         PHYSICAL EXAM   GENERAL: Not in acute distress, AAOx3, pleasant  CARDIO: regular rate and rhythm  PULM: symmetric chest rise, speaking in complete sentences without difficulty  NEURO: awake, alert and oriented to person, place, and time  ABDOMINAL: No fundal tenderness, no rebound or guarding, gravid  EXTREMITIES: no bilateral lower extremity edema/tenderness  SKIN: Warm, well-perfused      NST:  135/mod/+acc/no dec  [place]:quiet  Appropriate for gestational age.  Reactive/reassuring.     ASSESSMENT/COUNSELIN y.o.   at  29 3/7 weeks gestation (Estimated Date of Delivery: 24).     -Pregnancy  [ X ] stable  [   ] improving [  ] worsening    Diagnoses and all orders for this visit:    1. Intrahepatic cholestasis of pregnancy in third trimester (Primary)    2. Systemic lupus erythematosus- + anticardio AB, neg SS-A/SS-B    3. Antiphospholipid antibody syndrome complicating pregnancy- cardiolipin AB +        SLE:  Previously counseled.  Patient doesn't have an official dx of SLE, but began having joint symptoms with swelling right before she got pregnant.  Her mother has a diagnosis of RA.  She has seen rheumatology and met some but not all criteria for Lupus.  However, she is APLAS positive.  Given her symptoms/constellation of findings, she likely has Lupus or a similar autoimmune issue with similar risk factors during pregnancy, so we will treat her like lupus.  She is on plaquenil and doing well on 200 a day.  Her flares involve her joints and she has never had cardiac, renal, or neuro manifestations.  Her baseline P/C is 116 and BL Cr is 0.61 prepregnancy.  Platelets 431 earlier this year.  She was negative for Ro/La antibodies earlier this year.        I recommend she continue following with her rheumatologist as they recommend and have complement levels and Anti-DS DNA levels each trimester. These will be " helpful for comparison if she develops a flare or concern for preeclampsia during this pregnancy.  She will need serial growth exams and twice weekly ANFS starting by 32 weeks between me and her primary OB.      PIH labs appropriate.     Cholestasis:  Previously counseled.    Her bile acid is rising despite compliance with her ursodiol 500 TID.  Would recommend weekly assessment. She needs a follow up bile acid and CMP at OB office on Friday.  Will follow up RUQ US--if continues to rise or abnormalities on RUQ US, will consider starting cholestyramine and GI consult for further recs.  She may need to be delivered early if these continue to rise.      The rate of stillbirth after 37 weeks at baseline is 0.1-0.3% in the [place], but for cholestasis, the incidence is approximately 1-2%, thus I recommend delivery at 37 weeks.   In a prospective cohort study evaluating patients affected by ICP with total bile acid levels of 40 mmol/L and higher, it was found that the aOR for stillbirth was 2.58 for adverse fetal outcome when compared with the baseline data of patients without cholestasis.    The pathophysiology of stillbirth is poorly understood but is likely related to fetal arrythmia or vasospasm of placental vessels.  There is also evidence of increased risk of pre-eclampsia positively correlated with bile acid level.  Women with bile acids >40 are at highest risk with dx of pre-eclampsia often occurring 2-4 weeks after diagnosis of cholestasis.      I recommend 2x per week  fetal surveillance for this diagnosis starting by 32 weeks--we will see her weekly at 28 (definitely 2x per week if bile acids over 40).      Delivery between 34 and 36 weeks of gestation can be considered in women with ICP, with total bile acid levels of100 mmol/L, and with any of the followin) excruciating/ unremitting maternal pruritus not  relieved with ursodiol  2)Hx of stillbirth due to ICP  3)Evidence of worsening hepatic  function    Discussed risks fetal problems due to Cholestasis including fetal arrhthmias and even fetal death.  Therefore the recommendation is close (biweekly) fetal monitoring.  Follow      APLAS with no personal history of VTE  I discussed with the patient her diagnosis of APLAS, which is based on lupus and her positive lab tests.  We reviewed the risks of pregnancy with APLAS including but not limited to miscarriage, stillbirth, fetal growth restriction, preeclampsia, indicated  birth, and venous thromboembolic event.    We discussed decreasing the risk of these complications with LMWH and baby aspirin throughout pregnancy and for 4-6 weeks post partum.  We also recommend serial growth ultrasound and ANFS twice weekly beyond 32 weeks.    Summary of Plan  -Serial growth ultrasounds every 4 weeks (MFM)   -Starting at 32 weeks: twice Weekly fetal  surveillance until delivery--we will do NSTs here on  and BPP's at Dr. Hollins's office on .  We will switch the NST for a growth every 4 weeks.    -Continue ursodiol and repeat bile acids/CMP weekly now that rising.  Needs at OB on Friday.  Will follow up RUQ US and consider GI consult if abnormalities there or continues to rise.   -Continue ASA and Lovenox  -Continue plaquenil and following with rheum  -Delivery 37 weeks for APLAS, anticoagulation, lupus, cholestasis    Follow-up: weekly    Thank you for the consult and opportunity to care for this patient.  Please feel free to reach out with any questions or concerns.      I spent 20 minutes caring for this patient on this date of service. This time includes time spent by me in the following activities: preparing for the visit, reviewing tests, obtaining and/or reviewing a separately obtained history, performing a medically appropriate examination and/or evaluation, counseling and educating the patient/family/caregiver and independently interpreting results and communicating that information  with the patient/family/caregiver with greater than 50% spent in counseling and coordination of care.       I spent 4 minutes on the separately reported service of US imaging not included in the time used to support the E/M service also reported today.      Beverley Patino MD FACOG  Maternal Fetal Medicine-[facility]  Office: [phone]  [email or web address]

## 2024-06-04 NOTE — NON STRESS TEST
Reason for test: Other (Comment) (scheduled NST)  Date of Test: 6/4/2024  Time frame of test: 0415-8194  RN NST Interpretation: Reactive     L tilt 1418    Accels, no decels  No ctx  Appropriate for gestational age

## 2024-06-04 NOTE — PROGRESS NOTES
MATERNAL FETAL MEDICINE Consult Note    Dear Dr Tammy Hollins MD:    Thank you for your kind referral of Carina Galindo.  As you know, she is a 29 y.o.   at  29 3/7 weeks gestation (Estimated Date of Delivery: 24). This is a consult.      Her antepartum course is complicated by:  SLE  APLAS  Cholestasis    Aneuploidy Screening: low risk    HPI: Today, she denies headache, blurry vision, RUQ pain. No vaginal bleeding, no contractions.     Review of History:  Past Medical History:   Diagnosis Date    Antiphospholipid syndrome     Anxiety     exercise induced asthma     GERD (gastroesophageal reflux disease)     Iron deficiency anemia     Knee meniscus pain, left     Liver cyst     BX AND FOUND TO BE NEGATIVE    Lupus     OCD (obsessive compulsive disorder)     Rheumatoid factor positive      Past Surgical History:   Procedure Laterality Date    ADENOIDECTOMY      CHOLECYSTECTOMY WITH INTRAOPERATIVE CHOLANGIOGRAM N/A 2016    Procedure: CHOLECYSTECTOMY LAPAROSCOPIC INTRAOPERATIVE CHOLANGIOGRAM;  Surgeon: Malathi Lozano MD;  [facility] [place];  Service:     COLONOSCOPY  2010    ENDOSCOPY  2013    Gastritis     KNEE ARTHROSCOPY Left 3/28/2023    Procedure: Knee Arthroscopy with lateral release, meniscectomy and cyst excision;  Surgeon: Tapan Craig MD;  Location: [facility] [place];  Service: Orthopedics;  Laterality: Left;    NASAL SEPTUM SURGERY      SHOULDER SURGERY Right 2016    TONSILLECTOMY      x 2 due to regrowth    UPPER GASTROINTESTINAL ENDOSCOPY  2010    WISDOM TOOTH EXTRACTION         Social History     Socioeconomic History    Marital status:     Number of children: 0   Tobacco Use    Smoking status: Never     Passive exposure: Never    Smokeless tobacco: Never   Vaping Use    Vaping status: Never Used   Substance and Sexual Activity    Alcohol use: No    Drug use: Never    Sexual activity: Yes     Partners: Male     Birth control/protection: None     Family  History   Problem Relation Age of Onset    Diabetes Father     Gallbladder disease Mother 30    Rheum arthritis Mother     Hypertension Mother     Endometriosis Mother     Irritable bowel syndrome Mother     Pancreatitis Mother     Breast cancer Maternal Grandmother     Heart disease Maternal Grandfather     Breast cancer Maternal Aunt     Congenital heart disease Cousin     Elissa Hyperthermia Neg Hx     Deep vein thrombosis Neg Hx     Pulmonary embolism Neg Hx       Allergies   Allergen Reactions    Egg-Derived Products Swelling     Eyes swell shut, has some eggs cooked in food.    Other Other (See Comments)     FOOD ALLERGIES: see list : peanuts and ranch dressing      Shellfish-Derived Products Anaphylaxis    Nitrofurantoin Unknown (See Comments) and Rash     Unsure    Unsure    Peanut Butter Flavor [Flavoring Agent] Hives    Ciprofloxacin Rash    Erythromycin Rash     As a baby, no interaction since    Ibuprofen Rash     Has tolerated low dose in the past    Multihance [Gadobenate] Hives     Patient experienced one small hive on neck. Was instructed to be pre-medicated per radiologist prior to MRI's for future.     Sulfa Antibiotics Rash    Triamcinolone Rash      Current Outpatient Medications on File Prior to Visit   Medication Sig Dispense Refill    acetaminophen (TYLENOL) 325 MG tablet Take 1 tablet by mouth Every 6 (Six) Hours As Needed.      albuterol (VENTOLIN HFA) 108 (90 Base) MCG/ACT inhaler Inhale 2 puffs Every 4 (Four) Hours As Needed for Wheezing. 18 g 1    Alcohol Swabs 70 % pads Use 1 Swab Daily. 100 each 11    aspirin (ASPIR) 81 MG EC tablet Take one tablet by mouth daily until 36 weeks gestation. 90 tablet 3    diphenhydrAMINE (BENADRYL) 25 mg capsule Take 1 capsule by mouth Every 6 (Six) Hours As Needed for Itching.      doxylamine-pyridoxine ER (Bonjesta) 20-20 MG tablet controlled-release tablet Take 1 tablet by mouth 2 (Two) Times a Day. 60 tablet 0    Enoxaparin Sodium (LOVENOX) 40 MG/0.4ML  "solution prefilled syringe syringe Inject 0.4 mL under the skin into the appropriate area as directed Daily. 12 mL 11    EPINEPHrine (EPIPEN) 0.3 MG/0.3ML solution auto-injector injection       famotidine (Pepcid) 20 MG tablet Take 1 tablet by mouth Daily. 90 tablet 3    hydroxychloroquine (PLAQUENIL) 200 MG tablet Take 1 tablet by mouth Daily.      Polysaccharide Iron Complex (ProFe) 391.3 (180 Fe) MG capsule Take  by mouth.      Prenatal Vit-Fe Fumarate-FA (prenatal vitamin 27-0.8) 27-0.8 MG tablet tablet Take  by mouth Daily.      sertraline (ZOLOFT) 50 MG tablet       ursodiol (ACTIGALL) 500 MG tablet Take 1 tablet by mouth 3 (Three) Times a Day. 90 tablet 11    ferrous sulfate 325 (65 FE) MG tablet Take 1 tablet by mouth Daily With Breakfast. (Patient not taking: Reported on 6/4/2024) 90 tablet 2     No current facility-administered medications on file prior to visit.        Past obstetric, gynecological, medical, surgical, family and social history reviewed.  Relevant lab work and imaging reviewed.    Review of systems  Constitutional:  denies fever, chills, malaise.   ENT/Mouth:  denies sore throat, tinnitus  Eyes: denies vision changes/pain  CV:  denies chest pain  Respiratory:  denies cough/SOB  GI:  denies N/V, diarrhea, abdominal pain.    :   denies dysuria  Skin:  denies lesions or pruritus   Neuro:  denies weakness, focal neurologic symptoms    Vitals:    06/04/24 1413   BP: 109/70   BP Location: Right arm   Patient Position: Sitting   Pulse: 96   Temp: 98.7 °F (37.1 °C)   TempSrc: Temporal   Weight: 101 kg (223 lb)   Height: 162.6 cm (64\")         PHYSICAL EXAM   GENERAL: Not in acute distress, AAOx3, pleasant  CARDIO: regular rate and rhythm  PULM: symmetric chest rise, speaking in complete sentences without difficulty  NEURO: awake, alert and oriented to person, place, and time  ABDOMINAL: No fundal tenderness, no rebound or guarding, gravid  EXTREMITIES: no bilateral lower extremity " edema/tenderness  SKIN: Warm, well-perfused      NST:  135/mod/+acc/no dec  [place]:quiet  Appropriate for gestational age.  Reactive/reassuring.     ASSESSMENT/COUNSELIN y.o.   at  29 3/7 weeks gestation (Estimated Date of Delivery: 24).     -Pregnancy  [ X ] stable  [   ] improving [  ] worsening    Diagnoses and all orders for this visit:    1. Intrahepatic cholestasis of pregnancy in third trimester (Primary)    2. Systemic lupus erythematosus- + anticardio AB, neg SS-A/SS-B    3. Antiphospholipid antibody syndrome complicating pregnancy- cardiolipin AB +        SLE:  Previously counseled.  Patient doesn't have an official dx of SLE, but began having joint symptoms with swelling right before she got pregnant.  Her mother has a diagnosis of RA.  She has seen rheumatology and met some but not all criteria for Lupus.  However, she is APLAS positive.  Given her symptoms/constellation of findings, she likely has Lupus or a similar autoimmune issue with similar risk factors during pregnancy, so we will treat her like lupus.  She is on plaquenil and doing well on 200 a day.  Her flares involve her joints and she has never had cardiac, renal, or neuro manifestations.  Her baseline P/C is 116 and BL Cr is 0.61 prepregnancy.  Platelets 431 earlier this year.  She was negative for Ro/[place] antibodies earlier this year.        I recommend she continue following with her rheumatologist as they recommend and have complement levels and Anti-DS DNA levels each trimester. These will be helpful for comparison if she develops a flare or concern for preeclampsia during this pregnancy.  She will need serial growth exams and twice weekly ANFS starting by 32 weeks between me and her primary OB.      PIH labs appropriate.     Cholestasis:  Previously counseled.    Her bile acid is rising despite compliance with her ursodiol 500 TID.  Would recommend weekly assessment. She needs a follow up bile acid and CMP at OB office on  Friday.  Will follow up RUQ US--if continues to rise or abnormalities on RUQ US, will consider starting cholestyramine and GI consult for further recs.  She may need to be delivered early if these continue to rise.      The rate of stillbirth after 37 weeks at baseline is 0.1-0.3% in the [place], but for cholestasis, the incidence is approximately 1-2%, thus I recommend delivery at 37 weeks.   In a prospective cohort study evaluating patients affected by ICP with total bile acid levels of 40 mmol/L and higher, it was found that the aOR for stillbirth was 2.58 for adverse fetal outcome when compared with the baseline data of patients without cholestasis.    The pathophysiology of stillbirth is poorly understood but is likely related to fetal arrythmia or vasospasm of placental vessels.  There is also evidence of increased risk of pre-eclampsia positively correlated with bile acid level.  Women with bile acids >40 are at highest risk with dx of pre-eclampsia often occurring 2-4 weeks after diagnosis of cholestasis.      I recommend 2x per week  fetal surveillance for this diagnosis starting by 32 weeks--we will see her weekly at 28 (definitely 2x per week if bile acids over 40).      Delivery between 34 and 36 weeks of gestation can be considered in women with ICP, with total bile acid levels of100 mmol/L, and with any of the followin) excruciating/ unremitting maternal pruritus not  relieved with ursodiol  2)Hx of stillbirth due to ICP  3)Evidence of worsening hepatic function    Discussed risks fetal problems due to Cholestasis including fetal arrhthmias and even fetal death.  Therefore the recommendation is close (biweekly) fetal monitoring.  Follow      APLAS with no personal history of VTE  I discussed with the patient her diagnosis of APLAS, which is based on lupus and her positive lab tests.  We reviewed the risks of pregnancy with APLAS including but not limited to miscarriage, stillbirth, fetal  growth restriction, preeclampsia, indicated  birth, and venous thromboembolic event.    We discussed decreasing the risk of these complications with LMWH and baby aspirin throughout pregnancy and for 4-6 weeks post partum.  We also recommend serial growth ultrasound and ANFS twice weekly beyond 32 weeks.    Summary of Plan  -Serial growth ultrasounds every 4 weeks (MFM)   -Starting at 32 weeks: twice Weekly fetal  surveillance until delivery--we will do NSTs here on  and BPP's at Dr. Hollins's office on .  We will switch the NST for a growth every 4 weeks.    -Continue ursodiol and repeat bile acids/CMP weekly now that rising.  Needs at OB on Friday.  Will follow up RUQ US and consider GI consult if abnormalities there or continues to rise.   -Continue ASA and Lovenox  -Continue plaquenil and following with rheum  -Delivery 37 weeks for APLAS, anticoagulation, lupus, cholestasis    Follow-up: weekly    Thank you for the consult and opportunity to care for this patient.  Please feel free to reach out with any questions or concerns.      I spent 20 minutes caring for this patient on this date of service. This time includes time spent by me in the following activities: preparing for the visit, reviewing tests, obtaining and/or reviewing a separately obtained history, performing a medically appropriate examination and/or evaluation, counseling and educating the patient/family/caregiver and independently interpreting results and communicating that information with the patient/family/caregiver with greater than 50% spent in counseling and coordination of care.       I spent 4 minutes on the separately reported service of US imaging not included in the time used to support the E/M service also reported today.      Beverley Patino MD FACOG  Maternal Fetal Medicine-[facility]  Office: [phone]  [email or web address]

## 2024-06-07 ENCOUNTER — ROUTINE PRENATAL (OUTPATIENT)
Dept: OBSTETRICS AND GYNECOLOGY | Age: 29
End: 2024-06-07
Payer: COMMERCIAL

## 2024-06-07 VITALS — WEIGHT: 222 LBS | BODY MASS INDEX: 38.11 KG/M2 | SYSTOLIC BLOOD PRESSURE: 110 MMHG | DIASTOLIC BLOOD PRESSURE: 70 MMHG

## 2024-06-07 DIAGNOSIS — O99.891 SYSTEMIC LUPUS ERYTHEMATOSUS, MATERNAL, ANTEPARTUM: ICD-10-CM

## 2024-06-07 DIAGNOSIS — M32.9 SYSTEMIC LUPUS ERYTHEMATOSUS, MATERNAL, ANTEPARTUM: ICD-10-CM

## 2024-06-07 DIAGNOSIS — D68.61 ANTIPHOSPHOLIPID ANTIBODY SYNDROME COMPLICATING PREGNANCY: ICD-10-CM

## 2024-06-07 DIAGNOSIS — O99.810 ABNORMAL GLUCOSE TOLERANCE TEST (GTT) DURING PREGNANCY, ANTEPARTUM: ICD-10-CM

## 2024-06-07 DIAGNOSIS — Z3A.29 29 WEEKS GESTATION OF PREGNANCY: ICD-10-CM

## 2024-06-07 DIAGNOSIS — O99.019 MATERNAL ANEMIA IN PREGNANCY, ANTEPARTUM: ICD-10-CM

## 2024-06-07 DIAGNOSIS — O99.119 ANTIPHOSPHOLIPID ANTIBODY SYNDROME COMPLICATING PREGNANCY: ICD-10-CM

## 2024-06-07 DIAGNOSIS — F41.9 ANXIETY: ICD-10-CM

## 2024-06-07 DIAGNOSIS — Z34.93 PRENATAL CARE IN THIRD TRIMESTER: Primary | ICD-10-CM

## 2024-06-07 DIAGNOSIS — O99.210 OBESITY IN PREGNANCY, ANTEPARTUM: ICD-10-CM

## 2024-06-07 DIAGNOSIS — O26.642 INTRAHEPATIC CHOLESTASIS OF PREGNANCY IN SECOND TRIMESTER: ICD-10-CM

## 2024-06-07 LAB
GLUCOSE UR STRIP-MCNC: NEGATIVE MG/DL
PROT UR STRIP-MCNC: ABNORMAL MG/DL

## 2024-06-07 NOTE — PROGRESS NOTES
Chief Complaint   Patient presents with    Routine Prenatal Visit     29 weeks, US today  CC:  no problems       HPI: 29 y.o.  at 29w6d     Doing well  Reports good FM  Denies LOF, bleeding or ctx's  No itching  Pt of Dr. Hollins    Vitals:    24 0854   BP: 110/70   Weight: 101 kg (222 lb)       ROS:  GI:  Negative  : Negative  Pulmonary: Negative     A/P  1. Intrauterine pregnancy at 29w6d   2. Pregnancy Risk:  HIGH RISK    Diagnoses and all orders for this visit:    1. Prenatal care in third trimester (Primary)    2. 29 weeks gestation of pregnancy  -     POC Urinalysis Dipstick  -     Comprehensive Metabolic Panel  -     Bile Acids, Total    3. Antiphospholipid antibody syndrome complicating pregnancy- cardiolipin AB +    4. Abnormal glucose tolerance test (GTT) during pregnancy, antepartum    5. Intrahepatic cholestasis of pregnancy in second trimester  -     Comprehensive Metabolic Panel  -     Bile Acids, Total    6. Systemic lupus erythematosus- + anticardio AB, neg SS-A/SS-B    7. Obesity in pregnancy, antepartum    8. Maternal anemia in pregnancy, antepartum    9. Anxiety        -----------------------  PLAN:   BPP   Cholestasis - Check CMP and bile acids today. Continue actigall as prescribed.  Continue baby asa and lovenox  Lupus - continue plaquenil  PTL/FKC discussed  Pre-eclampsia warnings  Return for Next scheduled follow up.      Holli Bates, APRFLO  2024 09:23 EDT

## 2024-06-09 LAB
ALBUMIN SERPL-MCNC: 3.4 G/DL (ref 3.9–4.9)
ALBUMIN/GLOB SERPL: 1.2 {RATIO} (ref 1.2–2.2)
ALP SERPL-CCNC: 158 IU/L (ref 44–121)
ALT SERPL-CCNC: 27 IU/L (ref 0–32)
AST SERPL-CCNC: 19 IU/L (ref 0–40)
BILE AC SERPL-SCNC: 22.1 UMOL/L (ref 0–10)
BILIRUB SERPL-MCNC: 0.3 MG/DL (ref 0–1.2)
BUN SERPL-MCNC: 4 MG/DL (ref 6–20)
BUN/CREAT SERPL: 9 (ref 9–23)
CALCIUM SERPL-MCNC: 9 MG/DL (ref 8.7–10.2)
CHLORIDE SERPL-SCNC: 106 MMOL/L (ref 96–106)
CO2 SERPL-SCNC: 16 MMOL/L (ref 20–29)
CREAT SERPL-MCNC: 0.43 MG/DL (ref 0.57–1)
EGFRCR SERPLBLD CKD-EPI 2021: 133 ML/MIN/1.73
GLOBULIN SER CALC-MCNC: 2.8 G/DL (ref 1.5–4.5)
GLUCOSE SERPL-MCNC: 93 MG/DL (ref 70–99)
POTASSIUM SERPL-SCNC: 3.6 MMOL/L (ref 3.5–5.2)
PROT SERPL-MCNC: 6.2 G/DL (ref 6–8.5)
SODIUM SERPL-SCNC: 136 MMOL/L (ref 134–144)

## 2024-06-11 ENCOUNTER — OFFICE VISIT (OUTPATIENT)
Dept: OBSTETRICS AND GYNECOLOGY | Facility: CLINIC | Age: 29
End: 2024-06-11
Payer: COMMERCIAL

## 2024-06-11 ENCOUNTER — TELEPHONE (OUTPATIENT)
Dept: OBSTETRICS AND GYNECOLOGY | Age: 29
End: 2024-06-11
Payer: COMMERCIAL

## 2024-06-11 ENCOUNTER — CLINICAL SUPPORT (OUTPATIENT)
Dept: OBSTETRICS AND GYNECOLOGY | Facility: CLINIC | Age: 29
End: 2024-06-11
Payer: COMMERCIAL

## 2024-06-11 VITALS
TEMPERATURE: 98.6 F | DIASTOLIC BLOOD PRESSURE: 63 MMHG | WEIGHT: 222 LBS | HEIGHT: 64 IN | BODY MASS INDEX: 37.9 KG/M2 | SYSTOLIC BLOOD PRESSURE: 109 MMHG | HEART RATE: 97 BPM

## 2024-06-11 DIAGNOSIS — O26.643 CHOLESTASIS OF PREGNANCY IN THIRD TRIMESTER: Primary | ICD-10-CM

## 2024-06-11 DIAGNOSIS — K21.9 GASTROESOPHAGEAL REFLUX DISEASE, UNSPECIFIED WHETHER ESOPHAGITIS PRESENT: ICD-10-CM

## 2024-06-11 DIAGNOSIS — D68.61 ANTIPHOSPHOLIPID ANTIBODY SYNDROME COMPLICATING PREGNANCY: ICD-10-CM

## 2024-06-11 DIAGNOSIS — O99.119 ANTIPHOSPHOLIPID ANTIBODY SYNDROME COMPLICATING PREGNANCY: ICD-10-CM

## 2024-06-11 DIAGNOSIS — M32.9 SYSTEMIC LUPUS ERYTHEMATOSUS, MATERNAL, ANTEPARTUM: ICD-10-CM

## 2024-06-11 DIAGNOSIS — O99.210 OBESITY IN PREGNANCY, ANTEPARTUM: ICD-10-CM

## 2024-06-11 DIAGNOSIS — O99.891 SYSTEMIC LUPUS ERYTHEMATOSUS, MATERNAL, ANTEPARTUM: ICD-10-CM

## 2024-06-11 RX ORDER — PANTOPRAZOLE SODIUM 40 MG/1
40 TABLET, DELAYED RELEASE ORAL DAILY
Qty: 90 TABLET | Refills: 2 | Status: ON HOLD | OUTPATIENT
Start: 2024-06-11

## 2024-06-11 NOTE — PROGRESS NOTES
MATERNAL FETAL MEDICINE Consult Note    Dear Dr Tammy Hollins MD:    Thank you for your kind referral of Carina Galindo.  As you know, she is a 29 y.o.   at  30 3/7 weeks gestation (Estimated Date of Delivery: 24). This is a consult.      Her antepartum course is complicated by:  SLE  APLAS  Cholestasis    Aneuploidy Screening: low risk    HPI: Today, she denies headache, blurry vision, RUQ pain. No vaginal bleeding, no contractions.     Review of History:  Past Medical History:   Diagnosis Date    Antiphospholipid syndrome     Anxiety     exercise induced asthma     GERD (gastroesophageal reflux disease)     Iron deficiency anemia     Knee meniscus pain, left     Liver cyst     BX AND FOUND TO BE NEGATIVE    Lupus     OCD (obsessive compulsive disorder)     Rheumatoid factor positive      Past Surgical History:   Procedure Laterality Date    ADENOIDECTOMY      CHOLECYSTECTOMY WITH INTRAOPERATIVE CHOLANGIOGRAM N/A 2016    Procedure: CHOLECYSTECTOMY LAPAROSCOPIC INTRAOPERATIVE CHOLANGIOGRAM;  Surgeon: Malathi Lozano MD;  Location: [facility] OR [facility] [place];  Service:     COLONOSCOPY  2010    ENDOSCOPY  2013    Gastritis     KNEE ARTHROSCOPY Left 3/28/2023    Procedure: Knee Arthroscopy with lateral release, meniscectomy and cyst excision;  Surgeon: Tapan Craig MD;  Location: [facility] OR [facility] [place];  Service: Orthopedics;  Laterality: Left;    NASAL SEPTUM SURGERY      SHOULDER SURGERY Right 2016    TONSILLECTOMY      x 2 due to regrowth    UPPER GASTROINTESTINAL ENDOSCOPY  2010    WISDOM TOOTH EXTRACTION         Social History     Socioeconomic History    Marital status:     Number of children: 0   Tobacco Use    Smoking status: Never     Passive exposure: Never    Smokeless tobacco: Never   Vaping Use    Vaping status: Never Used   Substance and Sexual Activity    Alcohol use: No    Drug use: Never    Sexual activity: Yes     Partners: Male     Birth control/protection: None     Family  History   Problem Relation Age of Onset    Diabetes Father     Gallbladder disease Mother 30    Rheum arthritis Mother     Hypertension Mother     Endometriosis Mother     Irritable bowel syndrome Mother     Pancreatitis Mother     Breast cancer Maternal Grandmother     Heart disease Maternal Grandfather     Breast cancer Maternal Aunt     Congenital heart disease Cousin     Elissa Hyperthermia Neg Hx     Deep vein thrombosis Neg Hx     Pulmonary embolism Neg Hx       Allergies   Allergen Reactions    Egg-Derived Products Swelling     Eyes swell shut, has some eggs cooked in food.    Other Other (See Comments)     FOOD ALLERGIES: see list : peanuts and ranch dressing      Shellfish-Derived Products Anaphylaxis    Nitrofurantoin Unknown (See Comments) and Rash     Unsure    Unsure    Peanut Butter Flavor [Flavoring Agent] Hives    Ciprofloxacin Rash    Erythromycin Rash     As a baby, no interaction since    Ibuprofen Rash     Has tolerated low dose in the past    Multihance [Gadobenate] Hives     Patient experienced one small hive on neck. Was instructed to be pre-medicated per radiologist prior to MRI's for future.     Sulfa Antibiotics Rash    Triamcinolone Rash      Current Outpatient Medications on File Prior to Visit   Medication Sig Dispense Refill    acetaminophen (TYLENOL) 325 MG tablet Take 1 tablet by mouth Every 6 (Six) Hours As Needed.      albuterol (VENTOLIN HFA) 108 (90 Base) MCG/ACT inhaler Inhale 2 puffs Every 4 (Four) Hours As Needed for Wheezing. 18 g 1    Alcohol Swabs 70 % pads Use 1 Swab Daily. 100 each 11    aspirin (ASPIR) 81 MG EC tablet Take one tablet by mouth daily until 36 weeks gestation. 90 tablet 3    diphenhydrAMINE (BENADRYL) 25 mg capsule Take 1 capsule by mouth Every 6 (Six) Hours As Needed for Itching.      doxylamine-pyridoxine ER (Bonjesta) 20-20 MG tablet controlled-release tablet Take 1 tablet by mouth 2 (Two) Times a Day. 60 tablet 0    Enoxaparin Sodium (LOVENOX) 40 MG/0.4ML  "solution prefilled syringe syringe Inject 0.4 mL under the skin into the appropriate area as directed Daily. 12 mL 11    EPINEPHrine (EPIPEN) 0.3 MG/0.3ML solution auto-injector injection       famotidine (Pepcid) 20 MG tablet Take 1 tablet by mouth Daily. 90 tablet 3    ferrous sulfate 325 (65 FE) MG tablet Take 1 tablet by mouth Daily With Breakfast. 90 tablet 2    hydroxychloroquine (PLAQUENIL) 200 MG tablet Take 1 tablet by mouth Daily.      Polysaccharide Iron Complex (ProFe) 391.3 (180 Fe) MG capsule Take  by mouth.      Prenatal Vit-Fe Fumarate-FA (prenatal vitamin 27-0.8) 27-0.8 MG tablet tablet Take  by mouth Daily.      sertraline (ZOLOFT) 50 MG tablet       ursodiol (ACTIGALL) 500 MG tablet Take 1 tablet by mouth 3 (Three) Times a Day. 90 tablet 11     No current facility-administered medications on file prior to visit.      Past obstetric, gynecological, medical, surgical, family and social history reviewed.  Relevant lab work and imaging reviewed.    Review of systems  Constitutional:  denies fever, chills, malaise.   ENT/Mouth:  denies sore throat, tinnitus  Eyes: denies vision changes/pain  CV:  denies chest pain  Respiratory:  denies cough/SOB  GI:  denies N/V, diarrhea, abdominal pain.    :   denies dysuria  Skin:  denies lesions or pruritus   Neuro:  denies weakness, focal neurologic symptoms    Vitals:    06/11/24 1004   BP: 109/63   BP Location: Right arm   Patient Position: Sitting   Pulse: 97   Temp: 98.6 °F (37 °C)   TempSrc: Temporal   Weight: 101 kg (222 lb)   Height: 162.6 cm (64\")         PHYSICAL EXAM   GENERAL: Not in acute distress, AAOx3, pleasant  CARDIO: regular rate and rhythm  PULM: symmetric chest rise, speaking in complete sentences without difficulty  NEURO: awake, alert and oriented to person, place, and time  ABDOMINAL: No fundal tenderness, no rebound or guarding, gravid  EXTREMITIES: no bilateral lower extremity edema/tenderness  SKIN: Warm, " well-perfused      NST  135/mod/+acc/no dec  Bliss:quiet  Reactive/reassuring.   Duration 2968-9420    ASSESSMENT/COUNSELIN y.o.   at  30 3/7 weeks gestation (Estimated Date of Delivery: 24).     -Pregnancy  [ X ] stable  [   ] improving [  ] worsening    Diagnoses and all orders for this visit:    1. Cholestasis of pregnancy in third trimester (Primary)    2. Antiphospholipid antibody syndrome complicating pregnancy- cardiolipin AB +    3. Gastroesophageal reflux disease, unspecified whether esophagitis present    4. Obesity in pregnancy, antepartum    5. Systemic lupus erythematosus- + anticardio AB, neg SS-A/SS-B      SLE  Previously counseled.  Patient doesn't have an official dx of SLE, but began having joint symptoms with swelling right before she got pregnant.  Her mother has a diagnosis of RA.  She has seen rheumatology and met some but not all criteria for Lupus.  However, she is APLAS positive.  Given her symptoms/constellation of findings, she likely has Lupus or a similar autoimmune issue with similar risk factors during pregnancy, so we will treat her like lupus.  She is on plaquenil and doing well on 200 a day.  Her flares involve her joints and she has never had cardiac, renal, or neuro manifestations.  Her baseline P/C is 116 and BL Cr is 0.61 prepregnancy.  Platelets 431 earlier this year.  She was negative for Ro/La antibodies earlier this year.        I recommend she continue following with her rheumatologist as they recommend and have complement levels and Anti-DS DNA levels each trimester. These will be helpful for comparison if she develops a flare or concern for preeclampsia during this pregnancy.  She will need serial growth exams and twice weekly ANFS starting by 32 weeks between me and her primary OB.      PIH labs appropriate.     CHOLESTASIS  Previously counseled.    Her bile acid is rising despite compliance with her ursodiol 500 TID.  Would recommend weekly assessment.  She needs a follow up bile acid and CMP at OB office on Friday.  Will follow up RUQ US--if continues to rise or abnormalities on RUQ US, will consider starting cholestyramine and GI consult for further recs.  She may need to be delivered early if these continue to rise.      The rate of stillbirth after 37 weeks at baseline is 0.1-0.3% in the [place], but for cholestasis, the incidence is approximately 1-2%, thus I recommend delivery at 37 weeks.   In a prospective cohort study evaluating patients affected by ICP with total bile acid levels of 40 mmol/L and higher, it was found that the aOR for stillbirth was 2.58 for adverse fetal outcome when compared with the baseline data of patients without cholestasis.    The pathophysiology of stillbirth is poorly understood but is likely related to fetal arrythmia or vasospasm of placental vessels.  There is also evidence of increased risk of pre-eclampsia positively correlated with bile acid level.  Women with bile acids >40 are at highest risk with dx of pre-eclampsia often occurring 2-4 weeks after diagnosis of cholestasis.      I recommend 2x per week  fetal surveillance for this diagnosis starting by 32 weeks--we will see her weekly at 28 (definitely 2x per week if bile acids over 40).      Delivery between 34 and 36 weeks of gestation can be considered in women with ICP, with total bile acid levels of100 mmol/L, and with any of the followin) excruciating/ unremitting maternal pruritus not  relieved with ursodiol  2)Hx of stillbirth due to ICP  3)Evidence of worsening hepatic function    Discussed risks fetal problems due to Cholestasis including fetal arrhthmias and even fetal death.  Therefore the recommendation is close (biweekly) fetal monitoring.  Follow    APLAS WITH NO PERSONAL HISTORY OF VTE  I discussed with the patient her diagnosis of APLAS, which is based on lupus and her positive lab tests.  We reviewed the risks of pregnancy with APLAS including  but not limited to miscarriage, stillbirth, fetal growth restriction, preeclampsia, indicated  birth, and venous thromboembolic event.    We discussed decreasing the risk of these complications with LMWH and baby aspirin throughout pregnancy and for 4-6 weeks post partum.  We also recommend serial growth ultrasound and ANFS twice weekly beyond 32 weeks.    Pt complains of discomfort after eating in right upper quadrant. Will try protonix as pepcid is not helping.     RUQ ULTRASOUND RESULTS  IMPRESSION:  1. Mild right hydronephrosis.  2. Liver appears within normal limits. No biliary ductal dilatation.  Previous cholecystectomy.    Summary of Plan  -Serial growth ultrasounds every 4 weeks (MFM)   -Pre-Eclampsia labs at next primary OB visit.   -Starting at 32 weeks: twice Weekly fetal  surveillance until delivery--we will do NSTs here on  and BPP's at Dr. Hollins's office on .  We will switch the NST for a growth every 4 weeks.    -Continue ursodiol and repeat bile acids/CMP weekly now that rising.  Needs at OB on Friday.    -Consider GI consult if bile acids continue to rise  -Continue ASA and Lovenox  -Continue plaquenil and following with rheum  -Delivery 37 weeks for APLAS, anticoagulation, lupus, cholestasis    Follow-up: weekly    Thank you for the consult and opportunity to care for this patient.  Please feel free to reach out with any questions or concerns.      I spent 20 minutes caring for this patient on this date of service. This time includes time spent by me in the following activities: preparing for the visit, reviewing tests, obtaining and/or reviewing a separately obtained history, performing a medically appropriate examination and/or evaluation, counseling and educating the patient/family/caregiver and independently interpreting results and communicating that information with the patient/family/caregiver with greater than 50% spent in counseling and coordination of care.        ZACH Parsons  Maternal Fetal Medicine-[facility]  Office: [phone]  [email or web address]

## 2024-06-11 NOTE — LETTER
2024       No Recipients    Patient: Carina Galindo   YOB: 1995   Date of Visit: 2024       Dear Tammy Hollins MD    Carina Galindo was in my office today. Below is a copy of my note.    If you have questions, please do not hesitate to call me. I look forward to following Carina along with you.         Sincerely,        ZACH Stratton        CC:   No Recipients                                MATERNAL FETAL MEDICINE Consult Note    Dear Dr Tammy Hollins MD:    Thank you for your kind referral of Carina Galindo.  As you know, she is a 29 y.o.   at  30 3/7 weeks gestation (Estimated Date of Delivery: 24). This is a consult.      Her antepartum course is complicated by:  SLE  APLAS  Cholestasis    Aneuploidy Screening: low risk    HPI: Today, she denies headache, blurry vision, RUQ pain. No vaginal bleeding, no contractions.     Review of History:  Past Medical History:   Diagnosis Date   • Antiphospholipid syndrome    • Anxiety    • exercise induced asthma    • GERD (gastroesophageal reflux disease)    • Iron deficiency anemia    • Knee meniscus pain, left    • Liver cyst     BX AND FOUND TO BE NEGATIVE   • Lupus    • OCD (obsessive compulsive disorder)    • Rheumatoid factor positive      Past Surgical History:   Procedure Laterality Date   • ADENOIDECTOMY     • CHOLECYSTECTOMY WITH INTRAOPERATIVE CHOLANGIOGRAM N/A 2016    Procedure: CHOLECYSTECTOMY LAPAROSCOPIC INTRAOPERATIVE CHOLANGIOGRAM;  Surgeon: Malathi Lozano MD;  [place] [facility] [place] [facility] [place];  Service:    • COLONOSCOPY     • ENDOSCOPY  2013    Gastritis    • KNEE ARTHROSCOPY Left 3/28/2023    Procedure: Knee Arthroscopy with lateral release, meniscectomy and cyst excision;  Surgeon: Tapan Craig MD;  Location: [facility] [place] [facility] [place];  Service: Orthopedics;  Laterality: Left;   • NASAL SEPTUM SURGERY     • SHOULDER SURGERY Right    • TONSILLECTOMY      x 2 due to regrowth   • UPPER  GASTROINTESTINAL ENDOSCOPY  2010   • WISDOM TOOTH EXTRACTION         Social History     Socioeconomic History   • Marital status:    • Number of children: 0   Tobacco Use   • Smoking status: Never     Passive exposure: Never   • Smokeless tobacco: Never   Vaping Use   • Vaping status: Never Used   Substance and Sexual Activity   • Alcohol use: No   • Drug use: Never   • Sexual activity: Yes     Partners: Male     Birth control/protection: None     Family History   Problem Relation Age of Onset   • Diabetes Father    • Gallbladder disease Mother 30   • Rheum arthritis Mother    • Hypertension Mother    • Endometriosis Mother    • Irritable bowel syndrome Mother    • Pancreatitis Mother    • Breast cancer Maternal Grandmother    • Heart disease Maternal Grandfather    • Breast cancer Maternal Aunt    • Congenital heart disease Cousin    • Malig Hyperthermia Neg Hx    • Deep vein thrombosis Neg Hx    • Pulmonary embolism Neg Hx       Allergies   Allergen Reactions   • Egg-Derived Products Swelling     Eyes swell shut, has some eggs cooked in food.   • Other Other (See Comments)     FOOD ALLERGIES: see list : peanuts and ranch dressing     • Shellfish-Derived Products Anaphylaxis   • Nitrofurantoin Unknown (See Comments) and Rash     Unsure    Unsure   • Peanut Butter Flavor [Flavoring Agent] Hives   • Ciprofloxacin Rash   • Erythromycin Rash     As a baby, no interaction since   • Ibuprofen Rash     Has tolerated low dose in the past   • Multihance [Gadobenate] Hives     Patient experienced one small hive on neck. Was instructed to be pre-medicated per radiologist prior to MRI's for future.    • Sulfa Antibiotics Rash   • Triamcinolone Rash      Current Outpatient Medications on File Prior to Visit   Medication Sig Dispense Refill   • acetaminophen (TYLENOL) 325 MG tablet Take 1 tablet by mouth Every 6 (Six) Hours As Needed.     • albuterol (VENTOLIN HFA) 108 (90 Base) MCG/ACT inhaler Inhale 2 puffs Every 4  (Four) Hours As Needed for Wheezing. 18 g 1   • Alcohol Swabs 70 % pads Use 1 Swab Daily. 100 each 11   • aspirin (ASPIR) 81 MG EC tablet Take one tablet by mouth daily until 36 weeks gestation. 90 tablet 3   • diphenhydrAMINE (BENADRYL) 25 mg capsule Take 1 capsule by mouth Every 6 (Six) Hours As Needed for Itching.     • doxylamine-pyridoxine ER (Bonjesta) 20-20 MG tablet controlled-release tablet Take 1 tablet by mouth 2 (Two) Times a Day. 60 tablet 0   • Enoxaparin Sodium (LOVENOX) 40 MG/0.4ML solution prefilled syringe syringe Inject 0.4 mL under the skin into the appropriate area as directed Daily. 12 mL 11   • EPINEPHrine (EPIPEN) 0.3 MG/0.3ML solution auto-injector injection      • famotidine (Pepcid) 20 MG tablet Take 1 tablet by mouth Daily. 90 tablet 3   • ferrous sulfate 325 (65 FE) MG tablet Take 1 tablet by mouth Daily With Breakfast. 90 tablet 2   • hydroxychloroquine (PLAQUENIL) 200 MG tablet Take 1 tablet by mouth Daily.     • Polysaccharide Iron Complex (ProFe) 391.3 (180 Fe) MG capsule Take  by mouth.     • Prenatal Vit-Fe Fumarate-FA (prenatal vitamin 27-0.8) 27-0.8 MG tablet tablet Take  by mouth Daily.     • sertraline (ZOLOFT) 50 MG tablet      • ursodiol (ACTIGALL) 500 MG tablet Take 1 tablet by mouth 3 (Three) Times a Day. 90 tablet 11     No current facility-administered medications on file prior to visit.      Past obstetric, gynecological, medical, surgical, family and social history reviewed.  Relevant lab work and imaging reviewed.    Review of systems  Constitutional:  denies fever, chills, malaise.   ENT/Mouth:  denies sore throat, tinnitus  Eyes: denies vision changes/pain  CV:  denies chest pain  Respiratory:  denies cough/SOB  GI:  denies N/V, diarrhea, abdominal pain.    :   denies dysuria  Skin:  denies lesions or pruritus   Neuro:  denies weakness, focal neurologic symptoms    Vitals:    06/11/24 1004   BP: 109/63   BP Location: Right arm   Patient Position: Sitting   Pulse:  "97   Temp: 98.6 °F (37 °C)   TempSrc: Temporal   Weight: 101 kg (222 lb)   Height: 162.6 cm (64\")         PHYSICAL EXAM   GENERAL: Not in acute distress, AAOx3, pleasant  CARDIO: regular rate and rhythm  PULM: symmetric chest rise, speaking in complete sentences without difficulty  NEURO: awake, alert and oriented to person, place, and time  ABDOMINAL: No fundal tenderness, no rebound or guarding, gravid  EXTREMITIES: no bilateral lower extremity edema/tenderness  SKIN: Warm, well-perfused      NST  135/mod/+acc/no dec  [place]:quiet  Reactive/reassuring.   Duration 5295-1811    ASSESSMENT/COUNSELIN y.o.   at  30 3/7 weeks gestation (Estimated Date of Delivery: 24).     -Pregnancy  [ X ] stable  [   ] improving [  ] worsening    Diagnoses and all orders for this visit:    1. Cholestasis of pregnancy in third trimester (Primary)    2. Antiphospholipid antibody syndrome complicating pregnancy- cardiolipin AB +    3. Gastroesophageal reflux disease, unspecified whether esophagitis present    4. Obesity in pregnancy, antepartum    5. Systemic lupus erythematosus- + anticardio AB, neg SS-A/SS-B      SLE  Previously counseled.  Patient doesn't have an official dx of SLE, but began having joint symptoms with swelling right before she got pregnant.  Her mother has a diagnosis of RA.  She has seen rheumatology and met some but not all criteria for Lupus.  However, she is APLAS positive.  Given her symptoms/constellation of findings, she likely has Lupus or a similar autoimmune issue with similar risk factors during pregnancy, so we will treat her like lupus.  She is on plaquenil and doing well on 200 a day.  Her flares involve her joints and she has never had cardiac, renal, or neuro manifestations.  Her baseline P/C is 116 and BL Cr is 0.61 prepregnancy.  Platelets 431 earlier this year.  She was negative for Ro/[place] antibodies earlier this year.        I recommend she continue following with her rheumatologist " as they recommend and have complement levels and Anti-DS DNA levels each trimester. These will be helpful for comparison if she develops a flare or concern for preeclampsia during this pregnancy.  She will need serial growth exams and twice weekly ANFS starting by 32 weeks between me and her primary OB.      PIH labs appropriate.     CHOLESTASIS  Previously counseled.    Her bile acid is rising despite compliance with her ursodiol 500 TID.  Would recommend weekly assessment. She needs a follow up bile acid and CMP at OB office on Friday.  Will follow up RUQ US--if continues to rise or abnormalities on RUQ US, will consider starting cholestyramine and GI consult for further recs.  She may need to be delivered early if these continue to rise.      The rate of stillbirth after 37 weeks at baseline is 0.1-0.3% in the [place], but for cholestasis, the incidence is approximately 1-2%, thus I recommend delivery at 37 weeks.   In a prospective cohort study evaluating patients affected by ICP with total bile acid levels of 40 mmol/L and higher, it was found that the aOR for stillbirth was 2.58 for adverse fetal outcome when compared with the baseline data of patients without cholestasis.    The pathophysiology of stillbirth is poorly understood but is likely related to fetal arrythmia or vasospasm of placental vessels.  There is also evidence of increased risk of pre-eclampsia positively correlated with bile acid level.  Women with bile acids >40 are at highest risk with dx of pre-eclampsia often occurring 2-4 weeks after diagnosis of cholestasis.      I recommend 2x per week  fetal surveillance for this diagnosis starting by 32 weeks--we will see her weekly at 28 (definitely 2x per week if bile acids over 40).      Delivery between 34 and 36 weeks of gestation can be considered in women with ICP, with total bile acid levels of100 mmol/L, and with any of the followin) excruciating/ unremitting maternal pruritus  not  relieved with ursodiol  2)Hx of stillbirth due to ICP  3)Evidence of worsening hepatic function    Discussed risks fetal problems due to Cholestasis including fetal arrhthmias and even fetal death.  Therefore the recommendation is close (biweekly) fetal monitoring.  Follow    APLAS WITH NO PERSONAL HISTORY OF VTE  I discussed with the patient her diagnosis of APLAS, which is based on lupus and her positive lab tests.  We reviewed the risks of pregnancy with APLAS including but not limited to miscarriage, stillbirth, fetal growth restriction, preeclampsia, indicated  birth, and venous thromboembolic event.    We discussed decreasing the risk of these complications with LMWH and baby aspirin throughout pregnancy and for 4-6 weeks post partum.  We also recommend serial growth ultrasound and ANFS twice weekly beyond 32 weeks.    Pt complains of discomfort after eating in right upper quadrant. Will try protonix as pepcid is not helping.     RUQ ULTRASOUND RESULTS  IMPRESSION:  1. Mild right hydronephrosis.  2. Liver appears within normal limits. No biliary ductal dilatation.  Previous cholecystectomy.    Summary of Plan  -Serial growth ultrasounds every 4 weeks (MFM)   -Pre-Eclampsia labs at next primary OB visit.   -Starting at 32 weeks: twice Weekly fetal  surveillance until delivery--we will do NSTs here on  and BPP's at Dr. Hollins's office on .  We will switch the NST for a growth every 4 weeks.    -Continue ursodiol and repeat bile acids/CMP weekly now that rising.  Needs at OB on Friday.    -Consider GI consult if bile acids continue to rise  -Continue ASA and Lovenox  -Continue plaquenil and following with rheum  -Delivery 37 weeks for APLAS, anticoagulation, lupus, cholestasis    Follow-up: weekly    Thank you for the consult and opportunity to care for this patient.  Please feel free to reach out with any questions or concerns.      I spent 20 minutes caring for this patient on  this date of service. This time includes time spent by me in the following activities: preparing for the visit, reviewing tests, obtaining and/or reviewing a separately obtained history, performing a medically appropriate examination and/or evaluation, counseling and educating the patient/family/caregiver and independently interpreting results and communicating that information with the patient/family/caregiver with greater than 50% spent in counseling and coordination of care.       ZACH Parsons  Maternal Fetal Medicine-[facility]  Office: [phone]  [email or web address]

## 2024-06-11 NOTE — PROGRESS NOTES
Pt reports that she is doing well and denies vaginal bleeding, cramping, contractions or LOF at this time. Reports active fetal movement. Reviewed when to call OB office or present to L&D for evaluation with symptoms such as decreased fetal movement, vaginal bleeding, LOF or ctxs. Pt verbalized understanding. Denies HA, visual changes or epigastric pain. Denies any additional complaints at time of appointment. Next OB appointment scheduled for 06/14/2024.    Vitals:    06/11/24 1004   BP: 109/63   Pulse: 97   Temp: 98.6 °F (37 °C)

## 2024-06-11 NOTE — PROGRESS NOTES
Reason for test:  Scheduled weekly NST  Date of Test: 6/11/2024  Time frame of test: 9512-1423  RN NST Interpretation:  Reactive NST, moderate variability, +accelerations, no decelerations noted. +Fetal movement felt throughout monitoring.

## 2024-06-14 ENCOUNTER — ROUTINE PRENATAL (OUTPATIENT)
Dept: OBSTETRICS AND GYNECOLOGY | Age: 29
End: 2024-06-14
Payer: COMMERCIAL

## 2024-06-14 VITALS — BODY MASS INDEX: 38.28 KG/M2 | DIASTOLIC BLOOD PRESSURE: 80 MMHG | SYSTOLIC BLOOD PRESSURE: 104 MMHG | WEIGHT: 223 LBS

## 2024-06-14 DIAGNOSIS — F41.9 ANXIETY: ICD-10-CM

## 2024-06-14 DIAGNOSIS — O99.119 ANTIPHOSPHOLIPID ANTIBODY SYNDROME COMPLICATING PREGNANCY: ICD-10-CM

## 2024-06-14 DIAGNOSIS — Z34.03 ENCOUNTER FOR PRENATAL CARE IN THIRD TRIMESTER OF FIRST PREGNANCY: Primary | ICD-10-CM

## 2024-06-14 DIAGNOSIS — M32.9 SYSTEMIC LUPUS ERYTHEMATOSUS, MATERNAL, ANTEPARTUM: ICD-10-CM

## 2024-06-14 DIAGNOSIS — O99.891 SYSTEMIC LUPUS ERYTHEMATOSUS, MATERNAL, ANTEPARTUM: ICD-10-CM

## 2024-06-14 DIAGNOSIS — O99.210 OBESITY IN PREGNANCY, ANTEPARTUM: ICD-10-CM

## 2024-06-14 DIAGNOSIS — Z13.89 SCREENING FOR BLOOD OR PROTEIN IN URINE: ICD-10-CM

## 2024-06-14 DIAGNOSIS — O99.019 MATERNAL ANEMIA IN PREGNANCY, ANTEPARTUM: ICD-10-CM

## 2024-06-14 DIAGNOSIS — D68.61 ANTIPHOSPHOLIPID ANTIBODY SYNDROME COMPLICATING PREGNANCY: ICD-10-CM

## 2024-06-14 DIAGNOSIS — O26.643 INTRAHEPATIC CHOLESTASIS OF PREGNANCY IN THIRD TRIMESTER: ICD-10-CM

## 2024-06-14 LAB
BILIRUB BLD-MCNC: ABNORMAL MG/DL
GLUCOSE UR STRIP-MCNC: NEGATIVE MG/DL
KETONES UR QL: NEGATIVE
LEUKOCYTE EST, POC: ABNORMAL
NITRITE UR-MCNC: NEGATIVE MG/ML
PH UR: 7 [PH] (ref 5–8)
PROT UR STRIP-MCNC: ABNORMAL MG/DL
RBC # UR STRIP: NEGATIVE /UL
SP GR UR: 1.02 (ref 1–1.03)
UROBILINOGEN UR QL: NORMAL

## 2024-06-14 NOTE — PROGRESS NOTES
Chief Complaint   Patient presents with    Postpartum Care     Cc: ob check ,  follow up bile acid and CMP today , pt reports good FM , has some questions about Protonix wants to know if she should take it at night or morning , she takes pepcid at night for acid reflux , has been experiencing some  pain upper right stomach area - gallbladder was removed back in 2016. Has no ob complaints today .        HPI: 29 y.o.  at 30w6d presents for prenatal care    Last OB US growth (since 2024)       None          Vitals:    24 1121   BP: 104/80   Weight: 101 kg (223 lb)     Total weight gain for pregnancy:  4.082 kg (9 lb)    Review of systems:   Gen: negative  CV:     negative  GI: negative  :   negative and good fetal movement noted   MS:    negative  Neuro: negative  Pul: negative    Physical Exam  Vitals and nursing note reviewed.   Constitutional:       Appearance: Normal appearance.   Pulmonary:      Effort: Pulmonary effort is normal.   Neurological:      Mental Status: She is alert and oriented to person, place, and time.   Psychiatric:         Mood and Affect: Mood normal.         Behavior: Behavior normal.         Thought Content: Thought content normal.         Judgment: Judgment normal.           A/P  1. Intrauterine pregnancy at 30w6d   2. Pregnancy Risk:  HIGH RISK    Diagnoses and all orders for this visit:    1. Encounter for prenatal care in third trimester of first pregnancy (Primary)    2. Screening for blood or protein in urine  -     POC Urinalysis Dipstick    3. Intrahepatic cholestasis of pregnancy in third trimester  -     Bile Acids, Total  -     Comprehensive Metabolic Panel    4. Antiphospholipid antibody syndrome complicating pregnancy- cardiolipin AB +    5. Systemic lupus erythematosus- + anticardio AB, neg SS-A/SS-B    6. Obesity in pregnancy, antepartum    7. Maternal anemia in pregnancy, antepartum    8. Anxiety        Pre-eclampsia symptoms were discussed and warnings  were given.   labor was discussed.  Warnings were provided.  Nutrition and weight gain were addressed.  -----------------------  PLAN:   Return in about 1 week (around 2024), or ob check and BPP.  Cholestasis-continue Actigall and check labs today  Lupus -continue Plaquenil  Antiphospholipid antibody-continue Lovenox-changed to heparin at 33 weeks  Anemia-continue iron    Tammy Hollins MD  2024 11:54 EDT

## 2024-06-16 LAB
ALBUMIN SERPL-MCNC: 3.3 G/DL (ref 3.9–4.9)
ALP SERPL-CCNC: 175 IU/L (ref 44–121)
ALT SERPL-CCNC: 30 IU/L (ref 0–32)
AST SERPL-CCNC: 20 IU/L (ref 0–40)
BILE AC SERPL-SCNC: 60.7 UMOL/L (ref 0–10)
BILIRUB SERPL-MCNC: 0.3 MG/DL (ref 0–1.2)
BUN SERPL-MCNC: 2 MG/DL (ref 6–20)
BUN/CREAT SERPL: 5 (ref 9–23)
CALCIUM SERPL-MCNC: 8.9 MG/DL (ref 8.7–10.2)
CHLORIDE SERPL-SCNC: 106 MMOL/L (ref 96–106)
CO2 SERPL-SCNC: 21 MMOL/L (ref 20–29)
CREAT SERPL-MCNC: 0.43 MG/DL (ref 0.57–1)
EGFRCR SERPLBLD CKD-EPI 2021: 133 ML/MIN/1.73
GLOBULIN SER CALC-MCNC: 2.7 G/DL (ref 1.5–4.5)
GLUCOSE SERPL-MCNC: 75 MG/DL (ref 70–99)
POTASSIUM SERPL-SCNC: 3.6 MMOL/L (ref 3.5–5.2)
PROT SERPL-MCNC: 6 G/DL (ref 6–8.5)
SODIUM SERPL-SCNC: 138 MMOL/L (ref 134–144)

## 2024-06-17 ENCOUNTER — TELEPHONE (OUTPATIENT)
Dept: OBSTETRICS AND GYNECOLOGY | Facility: CLINIC | Age: 29
End: 2024-06-17
Payer: COMMERCIAL

## 2024-06-17 ENCOUNTER — HOSPITAL ENCOUNTER (INPATIENT)
Facility: HOSPITAL | Age: 29
LOS: 1 days | Discharge: HOME | End: 2024-06-18
Attending: STUDENT IN AN ORGANIZED HEALTH CARE EDUCATION/TRAINING PROGRAM | Admitting: STUDENT IN AN ORGANIZED HEALTH CARE EDUCATION/TRAINING PROGRAM
Payer: COMMERCIAL

## 2024-06-17 PROBLEM — Z34.90 PREGNANCY: Status: ACTIVE | Noted: 2024-06-17

## 2024-06-17 LAB
ABO GROUP BLD: NORMAL
ALBUMIN SERPL-MCNC: 3.3 G/DL (ref 3.5–5.2)
ALBUMIN/GLOB SERPL: 1 G/DL
ALP SERPL-CCNC: 184 U/L (ref 39–117)
ALT SERPL W P-5'-P-CCNC: 24 U/L (ref 1–33)
ANION GAP SERPL CALCULATED.3IONS-SCNC: 11.4 MMOL/L (ref 5–15)
AST SERPL-CCNC: 15 U/L (ref 1–32)
BILE AC SERPL-SCNC: 12 UMOL/L (ref 0–10)
BILIRUB SERPL-MCNC: 0.3 MG/DL (ref 0–1.2)
BLD GP AB SCN SERPL QL: NEGATIVE
BUN SERPL-MCNC: 4 MG/DL (ref 6–20)
BUN/CREAT SERPL: 10.8 (ref 7–25)
CALCIUM SPEC-SCNC: 8.8 MG/DL (ref 8.6–10.5)
CHLORIDE SERPL-SCNC: 109 MMOL/L (ref 98–107)
CO2 SERPL-SCNC: 17.6 MMOL/L (ref 22–29)
CREAT SERPL-MCNC: 0.37 MG/DL (ref 0.57–1)
CREAT UR-MCNC: 193.5 MG/DL
DEPRECATED RDW RBC AUTO: 36.6 FL (ref 37–54)
EGFRCR SERPLBLD CKD-EPI 2021: 138.5 ML/MIN/1.73
ERYTHROCYTE [DISTWIDTH] IN BLOOD BY AUTOMATED COUNT: 11.7 % (ref 12.3–15.4)
GLOBULIN UR ELPH-MCNC: 3.3 GM/DL
GLUCOSE SERPL-MCNC: 83 MG/DL (ref 65–99)
HCT VFR BLD AUTO: 34.2 % (ref 34–46.6)
HGB BLD-MCNC: 11.6 G/DL (ref 12–15.9)
MCH RBC QN AUTO: 29.3 PG (ref 26.6–33)
MCHC RBC AUTO-ENTMCNC: 33.9 G/DL (ref 31.5–35.7)
MCV RBC AUTO: 86.4 FL (ref 79–97)
PLATELET # BLD AUTO: 306 10*3/MM3 (ref 140–450)
PMV BLD AUTO: 9.1 FL (ref 6–12)
POTASSIUM SERPL-SCNC: 3.5 MMOL/L (ref 3.5–5.2)
PROT ?TM UR-MCNC: 38.7 MG/DL
PROT SERPL-MCNC: 6.6 G/DL (ref 6–8.5)
PROT/CREAT UR: 200 MG/G CREA (ref 0–200)
RBC # BLD AUTO: 3.96 10*6/MM3 (ref 3.77–5.28)
RH BLD: POSITIVE
SODIUM SERPL-SCNC: 138 MMOL/L (ref 136–145)
T PALLIDUM IGG SER QL: NORMAL
T&S EXPIRATION DATE: NORMAL
WBC NRBC COR # BLD AUTO: 16.28 10*3/MM3 (ref 3.4–10.8)

## 2024-06-17 PROCEDURE — 25010000002 BETAMETHASONE ACET & SOD PHOS PER 4 MG: Performed by: STUDENT IN AN ORGANIZED HEALTH CARE EDUCATION/TRAINING PROGRAM

## 2024-06-17 PROCEDURE — 59025 FETAL NON-STRESS TEST: CPT

## 2024-06-17 PROCEDURE — 86901 BLOOD TYPING SEROLOGIC RH(D): CPT | Performed by: STUDENT IN AN ORGANIZED HEALTH CARE EDUCATION/TRAINING PROGRAM

## 2024-06-17 PROCEDURE — 84156 ASSAY OF PROTEIN URINE: CPT | Performed by: STUDENT IN AN ORGANIZED HEALTH CARE EDUCATION/TRAINING PROGRAM

## 2024-06-17 PROCEDURE — 25010000002 BETAMETHASONE ACET & SOD PHOS PER 4 MG: Performed by: NURSE PRACTITIONER

## 2024-06-17 PROCEDURE — 82239 BILE ACIDS TOTAL: CPT | Performed by: STUDENT IN AN ORGANIZED HEALTH CARE EDUCATION/TRAINING PROGRAM

## 2024-06-17 PROCEDURE — 86850 RBC ANTIBODY SCREEN: CPT | Performed by: STUDENT IN AN ORGANIZED HEALTH CARE EDUCATION/TRAINING PROGRAM

## 2024-06-17 PROCEDURE — 80053 COMPREHEN METABOLIC PANEL: CPT | Performed by: STUDENT IN AN ORGANIZED HEALTH CARE EDUCATION/TRAINING PROGRAM

## 2024-06-17 PROCEDURE — 82570 ASSAY OF URINE CREATININE: CPT | Performed by: STUDENT IN AN ORGANIZED HEALTH CARE EDUCATION/TRAINING PROGRAM

## 2024-06-17 PROCEDURE — 86900 BLOOD TYPING SEROLOGIC ABO: CPT | Performed by: STUDENT IN AN ORGANIZED HEALTH CARE EDUCATION/TRAINING PROGRAM

## 2024-06-17 PROCEDURE — 59025 FETAL NON-STRESS TEST: CPT | Performed by: STUDENT IN AN ORGANIZED HEALTH CARE EDUCATION/TRAINING PROGRAM

## 2024-06-17 PROCEDURE — 96372 THER/PROPH/DIAG INJ SC/IM: CPT

## 2024-06-17 PROCEDURE — G0463 HOSPITAL OUTPT CLINIC VISIT: HCPCS

## 2024-06-17 PROCEDURE — 99222 1ST HOSP IP/OBS MODERATE 55: CPT | Performed by: STUDENT IN AN ORGANIZED HEALTH CARE EDUCATION/TRAINING PROGRAM

## 2024-06-17 PROCEDURE — 86780 TREPONEMA PALLIDUM: CPT | Performed by: STUDENT IN AN ORGANIZED HEALTH CARE EDUCATION/TRAINING PROGRAM

## 2024-06-17 PROCEDURE — 85027 COMPLETE CBC AUTOMATED: CPT | Performed by: STUDENT IN AN ORGANIZED HEALTH CARE EDUCATION/TRAINING PROGRAM

## 2024-06-17 RX ORDER — ONDANSETRON 4 MG/1
8 TABLET, ORALLY DISINTEGRATING ORAL EVERY 8 HOURS PRN
Status: DISCONTINUED | OUTPATIENT
Start: 2024-06-17 | End: 2024-06-18 | Stop reason: HOSPADM

## 2024-06-17 RX ORDER — DOCUSATE SODIUM 100 MG/1
100 CAPSULE, LIQUID FILLED ORAL 2 TIMES DAILY PRN
Status: DISCONTINUED | OUTPATIENT
Start: 2024-06-17 | End: 2024-06-18 | Stop reason: HOSPADM

## 2024-06-17 RX ORDER — BETAMETHASONE SODIUM PHOSPHATE AND BETAMETHASONE ACETATE 3; 3 MG/ML; MG/ML
12 INJECTION, SUSPENSION INTRA-ARTICULAR; INTRALESIONAL; INTRAMUSCULAR; SOFT TISSUE EVERY 24 HOURS
Status: COMPLETED | OUTPATIENT
Start: 2024-06-17 | End: 2024-06-18

## 2024-06-17 RX ORDER — LIDOCAINE HYDROCHLORIDE 10 MG/ML
0.5 INJECTION, SOLUTION INFILTRATION; PERINEURAL ONCE AS NEEDED
Status: DISCONTINUED | OUTPATIENT
Start: 2024-06-17 | End: 2024-06-18 | Stop reason: HOSPADM

## 2024-06-17 RX ORDER — PRENATAL VIT/IRON FUM/FOLIC AC 27MG-0.8MG
1 TABLET ORAL DAILY
Status: DISCONTINUED | OUTPATIENT
Start: 2024-06-17 | End: 2024-06-18 | Stop reason: HOSPADM

## 2024-06-17 RX ORDER — BISACODYL 10 MG
10 SUPPOSITORY, RECTAL RECTAL DAILY PRN
Status: DISCONTINUED | OUTPATIENT
Start: 2024-06-17 | End: 2024-06-17 | Stop reason: HOSPADM

## 2024-06-17 RX ORDER — SODIUM CHLORIDE 0.9 % (FLUSH) 0.9 %
10 SYRINGE (ML) INJECTION EVERY 12 HOURS SCHEDULED
Status: DISCONTINUED | OUTPATIENT
Start: 2024-06-17 | End: 2024-06-18 | Stop reason: HOSPADM

## 2024-06-17 RX ORDER — HYDROXYZINE 50 MG/1
25 TABLET, FILM COATED ORAL NIGHTLY PRN
Status: DISCONTINUED | OUTPATIENT
Start: 2024-06-17 | End: 2024-06-18 | Stop reason: HOSPADM

## 2024-06-17 RX ORDER — ONDANSETRON 2 MG/ML
4 INJECTION INTRAMUSCULAR; INTRAVENOUS EVERY 8 HOURS PRN
Status: DISCONTINUED | OUTPATIENT
Start: 2024-06-17 | End: 2024-06-18 | Stop reason: HOSPADM

## 2024-06-17 RX ORDER — PANTOPRAZOLE SODIUM 40 MG/1
40 TABLET, DELAYED RELEASE ORAL
Status: DISCONTINUED | OUTPATIENT
Start: 2024-06-18 | End: 2024-06-18 | Stop reason: HOSPADM

## 2024-06-17 RX ORDER — CALCIUM CARBONATE 500 MG/1
2 TABLET, CHEWABLE ORAL DAILY PRN
Status: DISCONTINUED | OUTPATIENT
Start: 2024-06-17 | End: 2024-06-17 | Stop reason: HOSPADM

## 2024-06-17 RX ORDER — ASPIRIN 81 MG/1
81 TABLET, CHEWABLE ORAL DAILY
Status: DISCONTINUED | OUTPATIENT
Start: 2024-06-17 | End: 2024-06-18 | Stop reason: HOSPADM

## 2024-06-17 RX ORDER — ACETAMINOPHEN 325 MG/1
650 TABLET ORAL EVERY 4 HOURS PRN
Status: DISCONTINUED | OUTPATIENT
Start: 2024-06-17 | End: 2024-06-17 | Stop reason: HOSPADM

## 2024-06-17 RX ORDER — SODIUM CHLORIDE 0.9 % (FLUSH) 0.9 %
10 SYRINGE (ML) INJECTION AS NEEDED
Status: DISCONTINUED | OUTPATIENT
Start: 2024-06-17 | End: 2024-06-18 | Stop reason: HOSPADM

## 2024-06-17 RX ORDER — URSODIOL 300 MG/1
300 CAPSULE ORAL 3 TIMES DAILY
Status: DISCONTINUED | OUTPATIENT
Start: 2024-06-17 | End: 2024-06-18 | Stop reason: HOSPADM

## 2024-06-17 RX ORDER — ENOXAPARIN SODIUM 100 MG/ML
40 INJECTION SUBCUTANEOUS DAILY
Status: DISCONTINUED | OUTPATIENT
Start: 2024-06-18 | End: 2024-06-18 | Stop reason: HOSPADM

## 2024-06-17 RX ORDER — SODIUM CHLORIDE 9 MG/ML
40 INJECTION, SOLUTION INTRAVENOUS AS NEEDED
Status: DISCONTINUED | OUTPATIENT
Start: 2024-06-17 | End: 2024-06-18 | Stop reason: HOSPADM

## 2024-06-17 RX ORDER — FERROUS SULFATE 325(65) MG
325 TABLET ORAL
Status: DISCONTINUED | OUTPATIENT
Start: 2024-06-18 | End: 2024-06-18 | Stop reason: HOSPADM

## 2024-06-17 RX ADMIN — URSOSIOL 300 MG: 300 CAPSULE ORAL at 22:01

## 2024-06-17 RX ADMIN — HYDROXYZINE HYDROCHLORIDE 25 MG: 50 TABLET, FILM COATED ORAL at 23:49

## 2024-06-17 RX ADMIN — URSOSIOL 300 MG: 300 CAPSULE ORAL at 18:44

## 2024-06-17 RX ADMIN — SERTRALINE 50 MG: 50 TABLET, FILM COATED ORAL at 18:12

## 2024-06-17 RX ADMIN — BETAMETHASONE ACETATE AND BETAMETHASONE SODIUM PHOSPHATE 12 MG: 3; 3 INJECTION, SUSPENSION INTRA-ARTICULAR; INTRALESIONAL; INTRAMUSCULAR; SOFT TISSUE at 18:08

## 2024-06-17 RX ADMIN — Medication 1 TABLET: at 18:13

## 2024-06-17 RX ADMIN — Medication 10 ML: at 22:01

## 2024-06-17 RX ADMIN — ASPIRIN 81 MG: 81 TABLET, CHEWABLE ORAL at 18:12

## 2024-06-17 NOTE — TELEPHONE ENCOUNTER
Call back received from Carina. Reviewed most recent bile acid level of 60.7 and reviewed Dr. Patino's recommendations of admission to L&D for further evaluation and workup. Patient in agreement with plan of care but reports she has another appointment this morning and will not be able to make it up to L&D until 1-2pm today. Reviewed this conversation with MFM, OB and L&D charge nurse Bessy.

## 2024-06-17 NOTE — H&P
[facility]  Obstetric History and Physical     Chief Complaint: elevated bile acids     Subjective     Patient is a 29 y.o. female  currently at 31+2 , who presents with cholestasis and increasing bile acids. She denies headache, vision changes, RUQ pain, chest pain, shortness of breath. She endorses FM and denies CTX, VB, LOF. On admission she was noted to have contractions on the monitor, however she states she does not feel these.     Her prenatal care is  complicated by APLAS and SLE, obesity and cholestasis.  Her bile acids have been trending up despite ursodiol. Her previous obstetric/gynecological history is noted for is non-contributory.      Prenatal Information:  See office H&P for full details and labs.      Past OB History:       OB History    Para Term  AB Living   1 0 0 0 0 0   SAB IAB Ectopic Molar Multiple Live Births   0 0 0 0 0 0   Obstetric Comments   3/29/24 - 19-6 weeks - normal, completed anatomy, normal cl - ant placenta -[facility]                 Past Medical History: Past Medical History:   Diagnosis Date    Antiphospholipid syndrome     Anxiety     exercise induced asthma     GERD (gastroesophageal reflux disease)     Iron deficiency anemia     Knee meniscus pain, left     Liver cyst     BX AND FOUND TO BE NEGATIVE    Lupus     OCD (obsessive compulsive disorder)     Rheumatoid factor positive         Past Surgical History Past Surgical History:   Procedure Laterality Date    ADENOIDECTOMY      CHOLECYSTECTOMY WITH INTRAOPERATIVE CHOLANGIOGRAM N/A 2016    Procedure: CHOLECYSTECTOMY LAPAROSCOPIC INTRAOPERATIVE CHOLANGIOGRAM;  Surgeon: Malathi Lozano MD;  [facility] [place];  Service:     COLONOSCOPY  2010    ENDOSCOPY  2013    Gastritis     KNEE ARTHROSCOPY Left 3/28/2023    Procedure: Knee Arthroscopy with lateral release, meniscectomy and cyst excision;  Surgeon: Tapan Craig MD;  [facility] [place];  Service: Orthopedics;  Laterality: Left;  "   NASAL SEPTUM SURGERY      SHOULDER SURGERY Right 2016    TONSILLECTOMY      x 2 due to regrowth    UPPER GASTROINTESTINAL ENDOSCOPY  2010    WISDOM TOOTH EXTRACTION          Family History: Family History   Problem Relation Age of Onset    Diabetes Father     Gallbladder disease Mother 30    Rheum arthritis Mother     Hypertension Mother     Endometriosis Mother     Irritable bowel syndrome Mother     Pancreatitis Mother     Breast cancer Maternal Grandmother     Heart disease Maternal Grandfather     Breast cancer Maternal Aunt     Congenital heart disease Cousin     Elissa Hyperthermia Neg Hx     Deep vein thrombosis Neg Hx     Pulmonary embolism Neg Hx       Social History:  reports that she has never smoked. She has never been exposed to tobacco smoke. She has never used smokeless tobacco.   reports no history of alcohol use.   reports no history of drug use.        General ROS: Pertinent items are noted in HPI    Objective      Vitals:     Vitals:    06/17/24 1521 06/17/24 1550 06/17/24 1631 06/17/24 1646   BP:  123/79 113/78 132/88   BP Location:  Right arm     Patient Position:  Sitting     Pulse:  93 102 104   Resp:  15     Temp:  98.5 °F (36.9 °C)     TempSrc:  Oral     Weight: 102 kg (225 lb)      Height:  162.6 cm (64\")         Fetal Heart Rate Assessment:   Category 1    Nina:   irritable     Physical Exam:     General Appearance:    Alert, cooperative, in no acute distress   Abdomen:     Soft, non-tender, no guarding, no rebound tenderness,       EFW < 7#   Pelvic Exam:    Pelvis adequate.    Presentation: Vertex    Cervix: was checked (by RN): closed cm / thick % / -2   Extremities:   Moves all extremities well, no edema, no cyanosis, no              redness   Skin:   No bleeding, bruising or rash   Neurologic:   No focal neurologic defect          Laboratory Results:   Lab Results (last 48 hours)       Procedure Component Value Units Date/Time    Protein / Creatinine Ratio, Urine - Urine, Clean " Catch [[id number]] Collected: 06/17/24 1604    Specimen: Urine, Clean Catch Updated: 06/17/24 1650     Protein/Creatinine Ratio, Urine 200.0 mg/G Crea      Creatinine, Urine 193.5 mg/dL      Total Protein, Urine 38.7 mg/dL     T Pallidum Antibody w/ reflex RPR (Syphilis) [[id number]]  (Normal) Collected: 06/17/24 1605    Specimen: Blood Updated: 06/17/24 1644     Treponemal AB Total Non-Reactive    Narrative:      Reactive results will reflex RPR testing.    Bile Acids, Total [[id number]]  (Abnormal) Collected: 06/17/24 1605    Specimen: Blood Updated: 06/17/24 1640     Bile Acids Total 12 umol/L     Comprehensive Metabolic Panel [[id number]]  (Abnormal) Collected: 06/17/24 1605    Specimen: Blood Updated: 06/17/24 1638     Glucose 83 mg/dL      BUN 4 mg/dL      Creatinine 0.37 mg/dL      Sodium 138 mmol/L      Potassium 3.5 mmol/L      Chloride 109 mmol/L      CO2 17.6 mmol/L      Calcium 8.8 mg/dL      Total Protein 6.6 g/dL      Albumin 3.3 g/dL      ALT (SGPT) 24 U/L      AST (SGOT) 15 U/L      Alkaline Phosphatase 184 U/L      Total Bilirubin 0.3 mg/dL      Globulin 3.3 gm/dL      A/G Ratio 1.0 g/dL      BUN/Creatinine Ratio 10.8     Anion Gap 11.4 mmol/L      eGFR 138.5 mL/min/1.73     Narrative:      GFR Normal >60  Chronic Kidney Disease <60  Kidney Failure <15      CBC (No Diff) [[id number]]  (Abnormal) Collected: 06/17/24 1605    Specimen: Blood Updated: 06/17/24 1619     WBC 16.28 10*3/mm3      RBC 3.96 10*6/mm3      Hemoglobin 11.6 g/dL      Hematocrit 34.2 %      MCV 86.4 fL      MCH 29.3 pg      MCHC 33.9 g/dL      RDW 11.7 %      RDW-SD 36.6 fl      MPV 9.1 fL      Platelets 306 10*3/mm3                Assessment & Plan     Principal Problem:    Pregnancy         Assessment/Plan   1.  Intrauterine pregnancy at 31+2  wks gestation.      2.  Cholestasis: Bile acids have been trending up to 60 3 days ago. They are 12 on admission.  Continue ursodiol.  Continue twice weekly BPP/NST. Patient has previously had  normal right upper quadrant ultrasound.  Betamethasone administered with first dose given 2024.  PIH labs within normal limits on admission.   -GI consult pending.   -NST 3 times daily while inpatient.    3.  APLAS/SLE: Continue ASA and Lovenox.  Plan to switch to heparin at 33 weeks.  Continue Plaquenil.  Patient is following with rheumatology.  PIH labs normal on admission.  Per [facility] anti-DS DNA and complement levels per each trimester.  Continue twice-weekly  fetal surveillance, and serial growth ultrasounds.               Ami Reis MD   2024   17:00 EDT

## 2024-06-17 NOTE — TELEPHONE ENCOUNTER
Call placed to Carina to review most recent lab values reviewed by Dr. Patino. No answer at this time. Voicemail left for patient to call [facility] office back at [phone].

## 2024-06-18 ENCOUNTER — TRANSCRIBE ORDERS (OUTPATIENT)
Dept: ULTRASOUND IMAGING | Facility: HOSPITAL | Age: 29
End: 2024-06-18
Payer: COMMERCIAL

## 2024-06-18 ENCOUNTER — APPOINTMENT (OUTPATIENT)
Dept: ULTRASOUND IMAGING | Facility: HOSPITAL | Age: 29
End: 2024-06-18
Payer: COMMERCIAL

## 2024-06-18 VITALS
RESPIRATION RATE: 16 BRPM | HEIGHT: 64 IN | BODY MASS INDEX: 38.26 KG/M2 | DIASTOLIC BLOOD PRESSURE: 63 MMHG | HEART RATE: 102 BPM | OXYGEN SATURATION: 98 % | SYSTOLIC BLOOD PRESSURE: 128 MMHG | TEMPERATURE: 98.2 F | WEIGHT: 224.1 LBS

## 2024-06-18 DIAGNOSIS — M32.9 SYSTEMIC LUPUS ERYTHEMATOSUS, MATERNAL, ANTEPARTUM: Primary | ICD-10-CM

## 2024-06-18 DIAGNOSIS — O99.891 SYSTEMIC LUPUS ERYTHEMATOSUS, MATERNAL, ANTEPARTUM: Primary | ICD-10-CM

## 2024-06-18 PROBLEM — O40.3XX0 POLYHYDRAMNIOS IN THIRD TRIMESTER: Status: ACTIVE | Noted: 2024-06-18

## 2024-06-18 LAB
ALBUMIN SERPL-MCNC: 3.1 G/DL (ref 3.5–5.2)
ALBUMIN/GLOB SERPL: 0.9 G/DL
ALP SERPL-CCNC: 167 U/L (ref 39–117)
ALT SERPL W P-5'-P-CCNC: 29 U/L (ref 1–33)
ANION GAP SERPL CALCULATED.3IONS-SCNC: 11 MMOL/L (ref 5–15)
AST SERPL-CCNC: 20 U/L (ref 1–32)
BILIRUB SERPL-MCNC: 0.3 MG/DL (ref 0–1.2)
BUN SERPL-MCNC: 4 MG/DL (ref 6–20)
BUN/CREAT SERPL: 7.3 (ref 7–25)
CALCIUM SPEC-SCNC: 8.8 MG/DL (ref 8.6–10.5)
CHLORIDE SERPL-SCNC: 107 MMOL/L (ref 98–107)
CO2 SERPL-SCNC: 17 MMOL/L (ref 22–29)
CREAT SERPL-MCNC: 0.55 MG/DL (ref 0.57–1)
EGFRCR SERPLBLD CKD-EPI 2021: 125.9 ML/MIN/1.73
GLOBULIN UR ELPH-MCNC: 3.3 GM/DL
GLUCOSE SERPL-MCNC: 165 MG/DL (ref 65–99)
POTASSIUM SERPL-SCNC: 3.3 MMOL/L (ref 3.5–5.2)
PROT SERPL-MCNC: 6.4 G/DL (ref 6–8.5)
SODIUM SERPL-SCNC: 135 MMOL/L (ref 136–145)

## 2024-06-18 PROCEDURE — 99221 1ST HOSP IP/OBS SF/LOW 40: CPT | Performed by: PHYSICIAN ASSISTANT

## 2024-06-18 PROCEDURE — 59025 FETAL NON-STRESS TEST: CPT

## 2024-06-18 PROCEDURE — 80053 COMPREHEN METABOLIC PANEL: CPT | Performed by: OBSTETRICS & GYNECOLOGY

## 2024-06-18 PROCEDURE — 99215 OFFICE O/P EST HI 40 MIN: CPT | Performed by: NURSE PRACTITIONER

## 2024-06-18 PROCEDURE — 25010000002 BETAMETHASONE ACET & SOD PHOS PER 4 MG: Performed by: NURSE PRACTITIONER

## 2024-06-18 PROCEDURE — 59025 FETAL NON-STRESS TEST: CPT | Performed by: OBSTETRICS & GYNECOLOGY

## 2024-06-18 PROCEDURE — 99239 HOSP IP/OBS DSCHRG MGMT >30: CPT | Performed by: OBSTETRICS & GYNECOLOGY

## 2024-06-18 PROCEDURE — 76819 FETAL BIOPHYS PROFIL W/O NST: CPT

## 2024-06-18 PROCEDURE — 96372 THER/PROPH/DIAG INJ SC/IM: CPT

## 2024-06-18 PROCEDURE — 76819 FETAL BIOPHYS PROFIL W/O NST: CPT | Performed by: OBSTETRICS & GYNECOLOGY

## 2024-06-18 PROCEDURE — 25010000002 ENOXAPARIN PER 10 MG: Performed by: STUDENT IN AN ORGANIZED HEALTH CARE EDUCATION/TRAINING PROGRAM

## 2024-06-18 RX ADMIN — FERROUS SULFATE TAB 325 MG (65 MG ELEMENTAL FE) 325 MG: 325 (65 FE) TAB at 08:26

## 2024-06-18 RX ADMIN — URSOSIOL 300 MG: 300 CAPSULE ORAL at 08:26

## 2024-06-18 RX ADMIN — PANTOPRAZOLE SODIUM 40 MG: 40 TABLET, DELAYED RELEASE ORAL at 06:02

## 2024-06-18 RX ADMIN — ENOXAPARIN SODIUM 40 MG: 100 INJECTION SUBCUTANEOUS at 08:26

## 2024-06-18 RX ADMIN — SERTRALINE 50 MG: 50 TABLET, FILM COATED ORAL at 08:26

## 2024-06-18 RX ADMIN — Medication 1 TABLET: at 08:26

## 2024-06-18 RX ADMIN — Medication 10 ML: at 08:27

## 2024-06-18 RX ADMIN — ASPIRIN 81 MG: 81 TABLET, CHEWABLE ORAL at 08:26

## 2024-06-18 RX ADMIN — BETAMETHASONE ACETATE AND BETAMETHASONE SODIUM PHOSPHATE 12 MG: 3; 3 INJECTION, SUSPENSION INTRA-ARTICULAR; INTRALESIONAL; INTRAMUSCULAR; SOFT TISSUE at 16:54

## 2024-06-18 NOTE — NURSING NOTE
"RN in room trouble shooting the EFM monitor since its not connecting to [employer] now.  New monitor then brought to room and patient went to the bathroom before she was put on the monitors for NST.  Patient reported \"couple very tiny dark red to brown clots\".  RN tired to see in toilet but no bleeding noted.  Specihat then placed in toilet and patient given peripads to monitor for any further bleeding.  Patient denies any bleeding on underwear and when wiped after voiding.  Patient reported feeling dull constant cramping but denies needing pain meds.  Abdomen palpates soft.  Patient then placed on monitors for NST.  "

## 2024-06-18 NOTE — NON STRESS TEST
Carina Galindo, a  at 31w3d with an HENNY of 2024, Date entered prior to episode creation, was seen at [address] for a nonstress test.    Chief Complaint   Patient presents with    cholestasis     DIRECT ADMIT - MFM told pt to come to L&D for labs, GI consult, and BMZ due to increase in bile acids. +FM. Denies LOF or VB.       Patient Active Problem List   Diagnosis    Anxiety    Asthma    Antiphospholipid antibody syndrome complicating pregnancy- cardiolipin AB +    Systemic lupus erythematosus- + anticardio AB, neg SS-A/SS-B    Obesity in pregnancy, antepartum    Intrahepatic cholestasis of pregnancy in third trimester    Maternal anemia in pregnancy, antepartum    Abnormal glucose tolerance test (GTT) during pregnancy, antepartum    Pregnancy    Polyhydramnios in third trimester       Start Time: 1628  Stop Time: 1649    Interpretation A  Nonstress Test Interpretation A: Reactive

## 2024-06-18 NOTE — PLAN OF CARE
Goal Outcome Evaluation:  Plan of Care Reviewed With: patient, spouse        Progress: no change  Outcome Evaluation: VSS.  RNST and active FM reported.  Active FM reported.  Patient initially reported mild cramping but resolved.  Patient reported tiny dark red to brown clot when voided at beginning of shift but denies any bleeding on toilet paper when wiped after voiding.  No further bleeding through the night.  Patient slept through the night with no complaints.   at bedside for support.

## 2024-06-18 NOTE — PROGRESS NOTES
"  ANTEPARTUM ROUNDING NOTE     Admission date 2024     SUBJECTIVE:     Carina Galindo is a 29 y.o.,  31w3d admitted for elevated bile acids to 60 with diagnosis of cholestasis.  Patient had bile acids drawn through the office that were significantly elevated.  Repeat here was 12.  Patient has had consultation with maternal-fetal medicine and gastroenterology.  Patient is on Actigall 500 mg 3 times a day.  Reports her itching is controlled with medication.  Patient also has antiphospholipid antibody syndrome possible lupus and obesity.  Denies vaginal bleeding, leakage of fluid, or contractions.  The patient notes good fetal movement.     OBJECTIVE:     Vitals:   Vitals:    24 2343 24 0141 24 0605 24 0821   BP: 99/44  123/71 111/71   BP Location: Right arm  Right arm Right arm   Patient Position: Lying  Sitting Sitting   Pulse: 110  90 98   Resp: 16  16 16   Temp: 98.4 °F (36.9 °C)  97.9 °F (36.6 °C) 97.9 °F (36.6 °C)   TempSrc: Oral  Oral Oral   SpO2: 96%  98% 98%   Weight:    102 kg (224 lb 1.6 oz)   Height:  162.6 cm (64\")         Results from last 7 days   Lab Units 24  1605   WBC 10*3/mm3 16.28*   HEMOGLOBIN g/dL 11.6*   HEMATOCRIT % 34.2   PLATELETS 10*3/mm3 306       Fetal heart rate tracing-reactive category 1  [place]-no contractions    BPP today is 8 out of 8. SHARI 26 consistent with mild polyhydramnios    EXAM:  Appearance/Psychiatric: In no distress, patient is sitting up in bed eating lunch.  Constitutional: The patient is well nourished   Cardiovascular: She does not have edema.    Respiratory: Respiratory effort is normal.   Abdomen: Soft, gravid, and nontender   Ext: nontender, no edema.     ASSESSMENT AND PLAN:     Intrahepatic cholestasis of pregnancy in third trimester    Antiphospholipid antibody syndrome complicating pregnancy- cardiolipin AB +    Systemic lupus erythematosus- + anticardio AB, neg SS-A/SS-B    Obesity in pregnancy, antepartum    Maternal " anemia in pregnancy, antepartum    Pregnancy    29-year-old  1 para 0 at 31-3/7 weeks    Cholestasis-patient's bile acids were increased on office labs.  Repeat check here was 12.  Patient's liver enzymes are normal.  She is on Actigall 500 mg 3 times a day.  Her symptoms are controlled.  GI has seen the patient and had no further recommendations.  Patient is also seen maternal-fetal medicine.  She is receiving a betamethasone course.  Recommendation was for twice-weekly testing with delivery at 37 weeks.  Earlier delivery could be considered if the patient has total bile acids exceeding 100, evidence of worsening hepatic function or unremitting pruritus not relieved by Actigall.  They recommended twice-weekly testing.  BPP and NST are reassuring here today.  Patient is scheduled to see Dr. Hollins on Friday and has follow-up early next week with [facility].  We discussed kick counting.  Weekly bile acids and CMP were recommended.  Antiphospholipid antibody syndrome and possible lupus.  Patient's platelet count is normal.  Continue Lovenox and low-dose aspirin.  Plan is to convert to heparin at 33 weeks.  Continue with Plaquenil and follow-up with urology.  Plan to discharge home later today after second betamethasone injection.  Patient will keep follow-up with Dr. Hollins on Friday.

## 2024-06-18 NOTE — CONSULTS
[facility]  Initial Inpatient Consult Note    Referring Provider: Dr. Reis    Reason for Consultation: cholestasis     Subjective     History of present illness:    29 y.o. female, w/ PMHx of antiphospholipid antibody syndrome, who is 31 weeks pregnant admitted w/ concern for increasing bile acids. Patient reports she first started having pruritus couple months ago, has been on ursodiol which has helped with pruritus. Most recently, she was noted to have worsening bile acid levels at 60.7 on 06/14. Since then, they have improved to 12 currently. She denies abdominal pain, fever, nausea/vomiting, pruritus.     Ultrasound 6/9/2024:   Mild R hydronephrosis, but otherwise normal liver. No biliary ductal dilatation. Hx of CCY.     Past Medical History:  Past Medical History:   Diagnosis Date    Antiphospholipid syndrome     Anxiety     exercise induced asthma     GERD (gastroesophageal reflux disease) 2010    Iron deficiency anemia     Knee meniscus pain, left     Liver cyst     BX AND FOUND TO BE NEGATIVE    Lupus     OCD (obsessive compulsive disorder)     Rheumatoid factor positive      Past Surgical History:  Past Surgical History:   Procedure Laterality Date    ADENOIDECTOMY      CHOLECYSTECTOMY WITH INTRAOPERATIVE CHOLANGIOGRAM N/A 07/14/2016    Procedure: CHOLECYSTECTOMY LAPAROSCOPIC INTRAOPERATIVE CHOLANGIOGRAM;  Surgeon: Malathi Lozano MD;  Location: [facility] OR [facility];  Service:     COLONOSCOPY  2010    ENDOSCOPY  11/01/2013    Gastritis     KNEE ARTHROSCOPY Left 3/28/2023    Procedure: Knee Arthroscopy with lateral release, meniscectomy and cyst excision;  Surgeon: Tapan Craig MD;  Location: [facility] OR [facility] [place];  Service: Orthopedics;  Laterality: Left;    NASAL SEPTUM SURGERY      SHOULDER SURGERY Right 2016    TONSILLECTOMY      x 2 due to regrowth    UPPER GASTROINTESTINAL ENDOSCOPY  2010    WISDOM TOOTH EXTRACTION        Social History:   Social History     Tobacco Use    Smoking  status: Never     Passive exposure: Never    Smokeless tobacco: Never   Substance Use Topics    Alcohol use: No      Family History:  Family History   Problem Relation Age of Onset    Diabetes Father     Gallbladder disease Mother 30    Rheum arthritis Mother     Hypertension Mother     Endometriosis Mother     Irritable bowel syndrome Mother     Pancreatitis Mother     Breast cancer Maternal Grandmother     Heart disease Maternal Grandfather     Breast cancer Maternal Aunt     Congenital heart disease Cousin     Malig Hyperthermia Neg Hx     Deep vein thrombosis Neg Hx     Pulmonary embolism Neg Hx        Home Meds:  Medications Prior to Admission   Medication Sig Dispense Refill Last Dose    acetaminophen (TYLENOL) 325 MG tablet Take 1 tablet by mouth Every 6 (Six) Hours As Needed.   Past Month    aspirin (ASPIR) 81 MG EC tablet Take one tablet by mouth daily until 36 weeks gestation. 90 tablet 3 6/16/2024    diphenhydrAMINE (BENADRYL) 25 mg capsule Take 1 capsule by mouth Every 6 (Six) Hours As Needed for Itching.   Past Month    Enoxaparin Sodium (LOVENOX) 40 MG/0.4ML solution prefilled syringe syringe Inject 0.4 mL under the skin into the appropriate area as directed Daily. 12 mL 11 6/16/2024    ferrous sulfate 325 (65 FE) MG tablet Take 1 tablet by mouth Daily With Breakfast. 90 tablet 2 6/17/2024    pantoprazole (Protonix) 40 MG EC tablet Take 1 tablet by mouth Daily. 90 tablet 2 6/16/2024    Prenatal Vit-Fe Fumarate-FA (prenatal vitamin 27-0.8) 27-0.8 MG tablet tablet Take  by mouth Daily.   6/16/2024    sertraline (ZOLOFT) 50 MG tablet    6/16/2024    ursodiol (ACTIGALL) 500 MG tablet Take 1 tablet by mouth 3 (Three) Times a Day. 90 tablet 11 6/17/2024    albuterol (VENTOLIN HFA) 108 (90 Base) MCG/ACT inhaler Inhale 2 puffs Every 4 (Four) Hours As Needed for Wheezing. 18 g 1 More than a month    Alcohol Swabs 70 % pads Use 1 Swab Daily. 100 each 11     doxylamine-pyridoxine ER (Bonjesta) 20-20 MG tablet  controlled-release tablet Take 1 tablet by mouth 2 (Two) Times a Day. 60 tablet 0     EPINEPHrine (EPIPEN) 0.3 MG/0.3ML solution auto-injector injection        hydroxychloroquine (PLAQUENIL) 200 MG tablet Take 1 tablet by mouth Daily.       Polysaccharide Iron Complex (ProFe) 391.3 (180 Fe) MG capsule Take  by mouth.        Current Meds:   aspirin, 81 mg, Oral, Daily  betamethasone acetate-betamethasone sodium phosphate, 12 mg, Intramuscular, Q24H  enoxaparin, 40 mg, Subcutaneous, Daily  ferrous sulfate, 325 mg, Oral, Daily With Breakfast  pantoprazole, 40 mg, Oral, Q AM  prenatal vitamin, 1 tablet, Oral, Daily  sertraline, 50 mg, Oral, Daily  sodium chloride, 10 mL, Intravenous, Q12H  ursodiol, 300 mg, Oral, TID      Allergies:  Allergies   Allergen Reactions    Egg-Derived Products Swelling     Eyes swell shut, has some eggs cooked in food.    Other Other (See Comments)     FOOD ALLERGIES: see list : peanuts and ranch dressing      Shellfish-Derived Products Anaphylaxis    Nitrofurantoin Unknown (See Comments) and Rash     Unsure    Unsure    Peanut Butter Flavor [Flavoring Agent] Hives    Ciprofloxacin Rash    Erythromycin Rash     As a baby, no interaction since    Ibuprofen Rash     Has tolerated low dose in the past    Multihance [Gadobenate] Hives     Patient experienced one small hive on neck. Was instructed to be pre-medicated per radiologist prior to MRI's for future.     Sulfa Antibiotics Rash    Triamcinolone Rash       Objective     Vital Signs  Temp:  [97.9 °F (36.6 °C)-98.5 °F (36.9 °C)] 97.9 °F (36.6 °C)  Heart Rate:  [] 98  Resp:  [15-18] 16  BP: ()/(44-88) 111/71  Physical Exam:  General Appearance:     Alert, cooperative, in no acute distress   Abdomen:     Distended.    Rectal:     Deferred       Results Review:   I reviewed the patient's new clinical results.    Results from last 7 days   Lab Units 06/17/24  1605   WBC 10*3/mm3 16.28*   HEMOGLOBIN g/dL 11.6*   HEMATOCRIT % 34.2    PLATELETS 10*3/mm3 306     Results from last 7 days   Lab Units 06/17/24  1605 06/14/24  1213   SODIUM mmol/L 138 138   POTASSIUM mmol/L 3.5 3.6   CHLORIDE mmol/L 109* 106   CO2 mmol/L 17.6* 21   BUN mg/dL 4* 2*   CREATININE mg/dL 0.37* 0.43*   CALCIUM mg/dL 8.8 8.9   BILIRUBIN mg/dL 0.3 0.3   ALK PHOS U/L 184* 175*   ALT (SGPT) U/L 24 30   AST (SGOT) U/L 15 20   GLUCOSE mg/dL 83 75         Lab Results   Lab Value Date/Time    LIPASE 54 05/23/2019 1041    LIPASE 58 06/25/2016 0052       Radiology:  [facility]    (Results Pending)         Assessment:   31 weeks pregnancy  Cholestasis  Elevated bile acids    Plan:   Significantly improved bile acids from 60.7 on 06/14, to 12 currently. She is on ursodiol and tolerating well. Asymptomatic- no pruritus, abd pain, fever. Discussed w/ Dr. Mccracken- no GI intervention needed at this time.   Repeat LFTs today.   Continue to trend bile acids at least weekly. Continue ursodiol    I discussed the patients findings and my recommendations with patient.         Piter Jimenez PA-C  [facility]  [address] [place] [address]  Office: [phone]

## 2024-06-18 NOTE — NON STRESS TEST
Carina Galindo, a  at 31w2d with an HENNY of 2024, Date entered prior to episode creation, was seen at [address] for a nonstress test.    Chief Complaint   Patient presents with    cholestasis     DIRECT ADMIT - MFM told pt to come to L&D for labs, GI consult, and BMZ due to increase in bile acids. +FM. Denies LOF or VB.       Patient Active Problem List   Diagnosis    Anxiety    Asthma    Antiphospholipid antibody syndrome complicating pregnancy- cardiolipin AB +    Systemic lupus erythematosus- + anticardio AB, neg SS-A/SS-B    Obesity in pregnancy, antepartum    Intrahepatic cholestasis of pregnancy in third trimester    Maternal anemia in pregnancy, antepartum    Abnormal glucose tolerance test (GTT) during pregnancy, antepartum    Pregnancy       Start Time:   Stop Time:     Interpretation A  Nonstress Test Interpretation A: Reactive

## 2024-06-18 NOTE — PROGRESS NOTES
"Daily Progress Note    Patient name: Carina Galindo  YOB: 1995   MRN: [id number]  Admission Date: 2024  Date of Service: 2024  Referring Provider: Ami Reis, *    Carina Galindo is a 29 y.o.    at 31w3d  admitted on 2024 for Intrahepatic cholestasis of pregnancy in third trimester    Hospital day 1      Diagnoses:   Patient Active Problem List    Diagnosis     *Intrahepatic cholestasis of pregnancy in third trimester [O26.643]     Polyhydramnios in third trimester [O40.3XX0]     Pregnancy [Z34.90]     Abnormal glucose tolerance test (GTT) during pregnancy, antepartum [O99.810]     Maternal anemia in pregnancy, antepartum [O99.019]     Obesity in pregnancy, antepartum [O99.210]     Antiphospholipid antibody syndrome complicating pregnancy- cardiolipin AB + [O99.119, D68.61]     Systemic lupus erythematosus- + anticardio AB, neg SS-A/SS-B [O99.891, M32.9]     Anxiety [F41.9]     Asthma [J45.909]        Chief Complaint:  Chief Complaint   Patient presents with    cholestasis     DIRECT ADMIT - MFM told pt to come to L&D for labs, GI consult, and BMZ due to increase in bile acids. +FM. Denies LOF or VB.       Subjective:      Carina has no complaints today.  No acute events overnight.  Reports fetal movement is normal  Denies leakage of amniotic fluid.  Denies vaginal bleeding    Objective:     Vitals:  Vitals:    24 2343 24 0141 24 0605 24 0821   BP: 99/44  123/71 111/71   BP Location: Right arm  Right arm Right arm   Patient Position: Lying  Sitting Sitting   Pulse: 110  90 98   Resp: 16  16 16   Temp: 98.4 °F (36.9 °C)  97.9 °F (36.6 °C) 97.9 °F (36.6 °C)   TempSrc: Oral  Oral Oral   SpO2: 96%  98% 98%   Weight:    102 kg (224 lb 1.6 oz)   Height:  162.6 cm (64\")         PHYSICAL EXAM   GENERAL: Not in acute distress, AAOx3, pleasant  CARDIO: regular rate and rhythm  PULM: symmetric chest rise, speaking in complete sentences without " difficulty  NEURO: awake, alert and oriented to person, place, and time  ABDOMINAL: No fundal tenderness, no rebound or guarding, gravid  EXTREMITIES: no bilateral lower extremity edema/tenderness  SKIN: Warm, well-perfused       Non-Stress Test:    Fetal Heart Rate Assessment   Method: Fetal HR Assessment Method: external   Beats/min: Fetal HR (beats/min): 130   Baseline: Fetal HR Baseline: normal range   Variability: Fetal HR Variability: moderate (amplitude range 6 to 25 bpm)   Accels: Fetal HR Accelerations: greater than/equal to 15 bpm, lasting at least 15 seconds   Decels: Fetal HR Decelerations: absent   Tracing Category:       Uterine Assessment   Method: Method: per patient report, palpation, external tocotransducer   Frequency (min): Contraction Frequency (Minutes): x4   Duration:     Intensity: Contraction Intensity: no contractions   Resting Tone: Uterine Resting Tone: soft by palpation       Most recent ultrasound: 6/18/2024  Please view full ultrasound note on Imaging tab in [employer].  Cephalic presentation.  Anterior placenta.  SHARI 26 cm, which is consistent with mild polyhydramnios.  BPP 8/8  Persistent right umbilical vein noted today.    Medications:  aspirin, 81 mg, Oral, Daily  betamethasone acetate-betamethasone sodium phosphate, 12 mg, Intramuscular, Q24H  enoxaparin, 40 mg, Subcutaneous, Daily  ferrous sulfate, 325 mg, Oral, Daily With Breakfast  pantoprazole, 40 mg, Oral, Q AM  prenatal vitamin, 1 tablet, Oral, Daily  sertraline, 50 mg, Oral, Daily  sodium chloride, 10 mL, Intravenous, Q12H  ursodiol, 300 mg, Oral, TID         PRN Medications:    docusate sodium    hydrOXYzine    lidocaine    ondansetron ODT **OR** ondansetron    sodium chloride    sodium chloride    Labs:  Lab Results (last 72 hours)       Procedure Component Value Units Date/Time    Comprehensive Metabolic Panel [[id number]] Collected: 06/18/24 1157    Specimen: Blood Updated: 06/18/24 1218    Protein / Creatinine Ratio,  Urine - Urine, Clean Catch [[id number]] Collected: 06/17/24 1604    Specimen: Urine, Clean Catch Updated: 06/17/24 1650     Protein/Creatinine Ratio, Urine 200.0 mg/G Crea      Creatinine, Urine 193.5 mg/dL      Total Protein, Urine 38.7 mg/dL     T Pallidum Antibody w/ reflex RPR (Syphilis) [[id number]]  (Normal) Collected: 06/17/24 1605    Specimen: Blood Updated: 06/17/24 1644     Treponemal AB Total Non-Reactive    Narrative:      Reactive results will reflex RPR testing.    Bile Acids, Total [[id number]]  (Abnormal) Collected: 06/17/24 1605    Specimen: Blood Updated: 06/17/24 1640     Bile Acids Total 12 umol/L     Comprehensive Metabolic Panel [[id number]]  (Abnormal) Collected: 06/17/24 1605    Specimen: Blood Updated: 06/17/24 1638     Glucose 83 mg/dL      BUN 4 mg/dL      Creatinine 0.37 mg/dL      Sodium 138 mmol/L      Potassium 3.5 mmol/L      Chloride 109 mmol/L      CO2 17.6 mmol/L      Calcium 8.8 mg/dL      Total Protein 6.6 g/dL      Albumin 3.3 g/dL      ALT (SGPT) 24 U/L      AST (SGOT) 15 U/L      Alkaline Phosphatase 184 U/L      Total Bilirubin 0.3 mg/dL      Globulin 3.3 gm/dL      A/G Ratio 1.0 g/dL      BUN/Creatinine Ratio 10.8     Anion Gap 11.4 mmol/L      eGFR 138.5 mL/min/1.73     Narrative:      GFR Normal >60  Chronic Kidney Disease <60  Kidney Failure <15      CBC (No Diff) [[id number]]  (Abnormal) Collected: 06/17/24 1605    Specimen: Blood Updated: 06/17/24 1619     WBC 16.28 10*3/mm3      RBC 3.96 10*6/mm3      Hemoglobin 11.6 g/dL      Hematocrit 34.2 %      MCV 86.4 fL      MCH 29.3 pg      MCHC 33.9 g/dL      RDW 11.7 %      RDW-SD 36.6 fl      MPV 9.1 fL      Platelets 306 10*3/mm3           Lab Results   Component Value Date    HGB 11.6 (L) 06/17/2024     PRUV:  We discussed that the umbilical cord starts off with two umbilical arteries (right and left) and two umbilical veins (right and left). During the 6th week of gestation, the right umbilical vein is supposed to disappear  and the left umbilical vein should be the only one remaining. In persistent right umbilical vein, the right vein stays open while the left vein disappears. The incidence of PRUV is 0.17%; of the babies that have PRUV, 13.5% will have other anomalies which also means that 86.5% of babies with PRUV have an isolated (no other birth defects) PRUV. Anomalies that have been associated with PRUV include cardiovascular, nervous, urinary, skeleton and respiratory anomalies. No other anomalies visualized, but she is increased risk for a heart defect for multiple reasons, so will get fetal ECHO.  She most likely falls into the 86.5% of patients that have isolated PRUV and is a normal variant. We discussed that ultrasound cannot rule out all anomalies.      Will have office schedule her for fetal echo.     Polyhydramnios, mild (SHARI 26)    She had her 1 hour GTT on 5/17 and it was 142, which is abnormal so a 3 hour glucose test was done on 05/23/24 and results were 77, 156, 91, & 93 which is normal.   Today, her US showed that her fluid was 26.   I counseled the patient about her mild polyhydramnios.  We reviewed the finding of mild polyhydramnios and potential causes and outcomes. The implications of polyhydramnios were discussed.? Fetal anomalies and genetic diagnoses become more common with severe polyhydramnios, while maternal diabetes and idiopathic factors are more often associated with milder cases, such as with this patient.  After birth, an abnormality may be diagnosed of cases considered idiopathic and not identified prenatally. Fetal infection, anemia, and neuromuscular disorders account for some of these cases, but are most commonly found with marked polyhydramnios which is not seen today.? Polyhydramnios may also be found in fetuses with chromosome abnormalities such as aneuploidy, however, the likelihood without associated structural anomaly is likely around only 1-2%. The patient had earlier aneuploidy screening  that was low risk,.    We discussed the low probability of adverse  outcome if the fluid remains only mildly elevated and there is no evidence of maternal diabetes.  We also discussed the slightly increased risk of stillbirth associated with polyhydramnios, but in the absence of diabetes or fetal malformation this risk is likely minimal.         Assessment/Plan:      Carina is a 29 y.o.    at 31w3d.    Intrahepatic Cholestasis of Pregnancy   -Actigall 500 mg 3 times a day    - Betamethasone (has received one dose and will receive second later today)   - S/P GI consult -- appreciate their care.    - Ok for discharge to home and outpatient management   - Weekly bile acids and CMP   - Recommend delivery at 37 weeks    Earlier delivery could be considered if the patient has total bile acids exceeding 100, evidence of worsening   hepatic function or unremitting pruritus not relieved by Actigall.     2.  PRUV  -Recommend Fetal Echo - will have our office schedule this   - Twice weekly ANFS outpatient management   - NST at [facility] on    - BPP at Dr Garcia office on   - Serial growth ultrasound every 4 weeks    3. APLAS/SLE   - Continue aspirin 81 mg daily   - Continue lovenox until 33 weeks and then will switch to heparin   - Continue plaquenil   - Continue follow up care with rheumatology    4. Mild Polyhydramnios   - BPP weekly to monitor SHARI   - If continues to increase, consider paneling blood sugars.     5. Fetal: NSTs bid; BPPs twice weekly, growth q 3-4 weeks.      MODE OF DELIVERY - per fetal presentation      DISPOSITION -  Ok to discharge to home today       I have seen and examined the patient for 45 minutes. More than >50% of the time was face-to-face patient counseling. All patient questions were answered, and patient concerns addressed.  This note has been routed to the referring obstetricians/medical provider. Thank you for requesting this clinical consult.          All questions  were answered to the best of my ability.    ZACH Stratton  6/18/2024    ZACH Parsons  Maternal Fetal Medicine-[facility]  Office: [phone]  [email or web address]

## 2024-06-18 NOTE — DISCHARGE SUMMARY
Date of Discharge:  6/18/2024    Discharge Diagnosis: Cholestasis of pregnancy    Presenting Problem/History of Present Illness  Active Hospital Problems    Diagnosis  POA    **Intrahepatic cholestasis of pregnancy in third trimester [O26.643]  Yes    Polyhydramnios in third trimester [O40.3XX0]  Unknown    Pregnancy [Z34.90]  Not Applicable    Maternal anemia in pregnancy, antepartum [O99.019]  Yes    Obesity in pregnancy, antepartum [O99.210]  Yes    Antiphospholipid antibody syndrome complicating pregnancy- cardiolipin AB + [O99.119, D68.61]  Yes    Systemic lupus erythematosus- + anticardio AB, neg SS-A/SS-B [O99.891, M32.9]  Yes      Resolved Hospital Problems   No resolved problems to display.          Hospital Course  Patient is a 29 y.o. female was admitted due to elevated bile acids of 60 from office lab.  Repeat bile acids were 12.  Liver enzymes were normal.  Patient was seen by gastroenterology and maternal-fetal medicine.  Betamethasone course was completed.  Patient's symptoms are controlled with Actigall 500 mg 3 times daily.  GI had no further recommendations.  [facility] recommended twice weekly testing with BPP.  Patient is scheduled for follow-up with Dr. Hollins on Friday.  We discussed the importance of kick counting.  Recommendation for delivery no later than 37 weeks.  Patient will continue her Plaquenil, aspirin and low molecular weight heparin for antiphospholipid antibody syndrome.  Biophysical profile today revealed mild polyhydramnios.  BPP was 8 out of 8..      Procedures Performed         Consults:   Consults       Date and Time Order Name Status Description    6/17/2024 11:06 PM Inpatient Maternal & Fetal Medicine Consult      6/17/2024  3:45 PM Inpatient Gastroenterology Consult Completed             Pertinent Test Results:  Normal liver enzymes.  Bile acids at 12.    Condition on Discharge: Good.  Fetal heart rate testing is reassuring.  BPP was 8 out of 8.    Vital Signs  Temp:  [97.9 °F  (36.6 °C)-98.5 °F (36.9 °C)] 97.9 °F (36.6 °C)  Heart Rate:  [] 98  Resp:  [15-18] 16  BP: ()/(44-88) 111/71    Physical Exam:   Physical exam from progress note today.    Discharge Disposition  Home or Self Care    Discharge Medications     Discharge Medications        Continue These Medications        Instructions Start Date   acetaminophen 325 MG tablet  Commonly known as: TYLENOL   325 mg, Oral, Every 6 Hours PRN      albuterol sulfate  (90 Base) MCG/ACT inhaler  Commonly known as: Ventolin HFA   2 puffs, Inhalation, Every 4 Hours PRN      Alcohol Swabs 70 % pads   1 Swab, Does not apply, Daily      aspirin 81 MG EC tablet  Commonly known as: ASPIR   Take one tablet by mouth daily until 36 weeks gestation.      Bonjesta 20-20 MG tablet controlled-release tablet  Generic drug: doxylamine-pyridoxine ER   1 tablet, Oral, 2 Times Daily      diphenhydrAMINE 25 mg capsule  Commonly known as: BENADRYL   25 mg, Oral, Every 6 Hours PRN      Enoxaparin Sodium 40 MG/0.4ML solution prefilled syringe syringe  Commonly known as: LOVENOX   40 mg, Subcutaneous, Every 24 Hours Scheduled      EPINEPHrine 0.3 MG/0.3ML solution auto-injector injection  Commonly known as: EPIPEN   No dose, route, or frequency recorded.      ferrous sulfate 325 (65 FE) MG tablet   325 mg, Oral, Daily With Breakfast      hydroxychloroquine 200 MG tablet  Commonly known as: PLAQUENIL   Take 1 tablet by mouth Daily.      pantoprazole 40 MG EC tablet  Commonly known as: Protonix   40 mg, Oral, Daily      prenatal vitamin 27-0.8 27-0.8 MG tablet tablet   Oral, Daily      ProFe 391.3 (180 Fe) MG capsule  Generic drug: Polysaccharide Iron Complex   Oral      sertraline 50 MG tablet  Commonly known as: ZOLOFT       ursodiol 500 MG tablet  Commonly known as: ACTIGALL   500 mg, Oral, 3 Times Daily               Discharge Diet:     Activity at Discharge:     Follow-up Appointments  Future Appointments   Date Time Provider Department Center    6/21/2024  8:00 AM [facility]   6/21/2024  8:30 AM Tammy Hollins MD [facility]   6/25/2024 10:00 AM [facility] 1 [facility]   6/25/2024 10:00 AM Beverley Patino MD [facility]   6/28/2024 11:00 AM [facility]   6/28/2024 11:45 AM Tammy Hollins MD [facility]   7/2/2024 10:30 AM Beverley Patino MD [facility]   7/2/2024 10:30 AM NURSE/MA [facility]   7/5/2024  8:30 AM [facility]   7/5/2024  9:15 AM Karin Dee PA-C [facility]   7/9/2024 10:30 AM Beverley Patino MD [facility]   7/9/2024 10:30 AM NURSE/MA [facility]   7/12/2024 10:30 AM [facility]   7/12/2024 11:00 AM Tammy Hollins MD [facility]   7/16/2024 10:30 AM Beverley Patino MD [facility]   7/16/2024 10:30 AM NURSE/MA [facility]   7/19/2024  8:30 AM [facility]   7/19/2024  9:15 AM Holli Bates APRN [facility]   7/23/2024 10:00 AM [facility] 1 [facility]   7/23/2024 10:00 AM Beverley Patino MD [facility]   7/26/2024 12:30 PM [facility]   7/26/2024  1:00 PM Tammy Hollins MD [facility]         Test Results Pending at Discharge       Renaldo Partida MD  06/18/24  11:44 EDT    Time: Discharge 35 min

## 2024-06-18 NOTE — NON STRESS TEST
Carina Galindo, a  at 31w3d with an HENNY of 2024, Date entered prior to episode creation, was seen at [address] for a nonstress test.    Chief Complaint   Patient presents with    cholestasis     DIRECT ADMIT - MFM told pt to come to L&D for labs, GI consult, and BMZ due to increase in bile acids. +FM. Denies LOF or VB.       Patient Active Problem List   Diagnosis    Anxiety    Asthma    Antiphospholipid antibody syndrome complicating pregnancy- cardiolipin AB +    Systemic lupus erythematosus- + anticardio AB, neg SS-A/SS-B    Obesity in pregnancy, antepartum    Intrahepatic cholestasis of pregnancy in third trimester    Maternal anemia in pregnancy, antepartum    Abnormal glucose tolerance test (GTT) during pregnancy, antepartum    Pregnancy       Start Time: 825  Stop Time: 849    Interpretation A  Nonstress Test Interpretation A: Reactive

## 2024-06-18 NOTE — PLAN OF CARE
Goal Outcome Evaluation:  Plan of Care Reviewed With: patient        Progress: improving  Outcome Evaluation: VSS. Pt seen by GI and MFM. +FM and RNST today. Pt will receive 2nd dose of BMZ @ 1700. Pt denies VB,CTX, and LOF at this time. Pt has no other complaints. Pt to be discharged after BMZ.

## 2024-06-19 NOTE — PAYOR COMM NOTE
"Michaeljoce Carina (29 y.o. Female)       Date of Birth   1995    Social Security Number       Address   [address] [place] [address]    Home Phone   [phone]    MRN   [id number]       [facility]    Marital Status                               Admission Date   6/17/24    Admission Type   Emergency    Admitting Provider   Ami Reis MD    Attending Provider       Department, Room/Bed   [address], S307/1       Discharge Date   6/18/2024    Discharge Disposition   Home or Self Care    Discharge Destination                                 Attending Provider: (none)   Allergies: Egg-derived Products, Other, Shellfish-derived Products, Nitrofurantoin, Peanut Butter Flavor [Flavoring Agent], Ciprofloxacin, Erythromycin, Ibuprofen, Multihance [Gadobenate], Sulfa Antibiotics, Triamcinolone    Isolation: None   Infection: MRSA/History Only (03/28/23)   Code Status: Prior    Ht: 162.6 cm (64\")   Wt: 102 kg (224 lb 1.6 oz)    Admission Cmt: None   Principal Problem: Intrahepatic cholestasis of pregnancy in third trimester [O26.643]                   Active Insurance as of 6/17/2024       Primary Coverage       Payor Plan Insurance Group Employer/Plan Group    [employer] EMPLOYEE [id number]       Payor Plan Address Payor Plan Phone Number Payor Plan Fax Number Effective Dates    [address] [phone]  10/1/2023 - None Entered    [place] [address]         Subscriber Name Subscriber Birth Date Member ID       MICHEALTOMEKA SANDERSBY 1995 [id number]                     Emergency Contacts        (Rel.) Home Phone Work Phone Mobile Phone    Breann Galindo (Mother) [phone] -- [phone]    BARRONSHIRA MANCINI (Spouse) -- -- [phone]              Insurance Information                  [employer] Phone: [phone]    Subscriber: Carina Galindo Subscriber#: [id number]    " Group#: [id number] Precert#: --             History & Physical        Ami Reis MD at 24 3853          [facility]  Obstetric History and Physical     Chief Complaint: elevated bile acids     Subjective    Patient is a 29 y.o. female  currently at 31+2 , who presents with cholestasis and increasing bile acids. She denies headache, vision changes, RUQ pain, chest pain, shortness of breath. She endorses FM and denies CTX, VB, LOF. On admission she was noted to have contractions on the monitor, however she states she does not feel these.     Her prenatal care is  complicated by APLAS and SLE, obesity and cholestasis.  Her bile acids have been trending up despite ursodiol. Her previous obstetric/gynecological history is noted for is non-contributory.      Prenatal Information:  See office H&P for full details and labs.      Past OB History:       OB History    Para Term  AB Living   1 0 0 0 0 0   SAB IAB Ectopic Molar Multiple Live Births   0 0 0 0 0 0   Obstetric Comments   3/29/24 - 19-6 weeks - normal, completed anatomy, normal cl - ant placenta -[facility] [place]                 Past Medical History: Past Medical History:   Diagnosis Date    Antiphospholipid syndrome     Anxiety     exercise induced asthma     GERD (gastroesophageal reflux disease) 2010    Iron deficiency anemia     Knee meniscus pain, left     Liver cyst     BX AND FOUND TO BE NEGATIVE    Lupus     OCD (obsessive compulsive disorder)     Rheumatoid factor positive         Past Surgical History Past Surgical History:   Procedure Laterality Date    ADENOIDECTOMY      CHOLECYSTECTOMY WITH INTRAOPERATIVE CHOLANGIOGRAM N/A 2016    Procedure: CHOLECYSTECTOMY LAPAROSCOPIC INTRAOPERATIVE CHOLANGIOGRAM;  Surgeon: Malathi Lozano MD;  Location: [facility] OR [facility];  Service:     COLONOSCOPY  2010    ENDOSCOPY  2013    Gastritis     KNEE ARTHROSCOPY Left 3/28/2023    Procedure: Knee Arthroscopy with lateral release,  "meniscectomy and cyst excision;  Surgeon: Tapan Craig MD;  Location: [facility] OR [facility];  Service: Orthopedics;  Laterality: Left;    NASAL SEPTUM SURGERY      SHOULDER SURGERY Right 2016    TONSILLECTOMY      x 2 due to regrowth    UPPER GASTROINTESTINAL ENDOSCOPY  2010    WISDOM TOOTH EXTRACTION          Family History: Family History   Problem Relation Age of Onset    Diabetes Father     Gallbladder disease Mother 30    Rheum arthritis Mother     Hypertension Mother     Endometriosis Mother     Irritable bowel syndrome Mother     Pancreatitis Mother     Breast cancer Maternal Grandmother     Heart disease Maternal Grandfather     Breast cancer Maternal Aunt     Congenital heart disease Cousin     Malig Hyperthermia Neg Hx     Deep vein thrombosis Neg Hx     Pulmonary embolism Neg Hx       Social History:  reports that she has never smoked. She has never been exposed to tobacco smoke. She has never used smokeless tobacco.   reports no history of alcohol use.   reports no history of drug use.        General ROS: Pertinent items are noted in HPI    Objective     Vitals:     Vitals:    06/17/24 1521 06/17/24 1550 06/17/24 1631 06/17/24 1646   BP:  123/79 113/78 132/88   BP Location:  Right arm     Patient Position:  Sitting     Pulse:  93 102 104   Resp:  15     Temp:  98.5 °F (36.9 °C)     TempSrc:  Oral     Weight: 102 kg (225 lb)      Height:  162.6 cm (64\")         Fetal Heart Rate Assessment:   Category 1    Excelsior:   irritable     Physical Exam:     General Appearance:    Alert, cooperative, in no acute distress   Abdomen:     Soft, non-tender, no guarding, no rebound tenderness,       EFW < 7#   Pelvic Exam:    Pelvis adequate.    Presentation: Vertex    Cervix: was checked (by RN): closed cm / thick % / -2   Extremities:   Moves all extremities well, no edema, no cyanosis, no              redness   Skin:   No bleeding, bruising or rash   Neurologic:   No focal neurologic defect          Laboratory Results: "   Lab Results (last 48 hours)       Procedure Component Value Units Date/Time    Protein / Creatinine Ratio, Urine - Urine, Clean Catch [[id number]] Collected: 06/17/24 1604    Specimen: Urine, Clean Catch Updated: 06/17/24 1650     Protein/Creatinine Ratio, Urine 200.0 mg/G Crea      Creatinine, Urine 193.5 mg/dL      Total Protein, Urine 38.7 mg/dL     T Pallidum Antibody w/ reflex RPR (Syphilis) [[id number]]  (Normal) Collected: 06/17/24 1605    Specimen: Blood Updated: 06/17/24 1644     Treponemal AB Total Non-Reactive    Narrative:      Reactive results will reflex RPR testing.    Bile Acids, Total [[id number]]  (Abnormal) Collected: 06/17/24 1605    Specimen: Blood Updated: 06/17/24 1640     Bile Acids Total 12 umol/L     Comprehensive Metabolic Panel [[id number]]  (Abnormal) Collected: 06/17/24 1605    Specimen: Blood Updated: 06/17/24 1638     Glucose 83 mg/dL      BUN 4 mg/dL      Creatinine 0.37 mg/dL      Sodium 138 mmol/L      Potassium 3.5 mmol/L      Chloride 109 mmol/L      CO2 17.6 mmol/L      Calcium 8.8 mg/dL      Total Protein 6.6 g/dL      Albumin 3.3 g/dL      ALT (SGPT) 24 U/L      AST (SGOT) 15 U/L      Alkaline Phosphatase 184 U/L      Total Bilirubin 0.3 mg/dL      Globulin 3.3 gm/dL      A/G Ratio 1.0 g/dL      BUN/Creatinine Ratio 10.8     Anion Gap 11.4 mmol/L      eGFR 138.5 mL/min/1.73     Narrative:      GFR Normal >60  Chronic Kidney Disease <60  Kidney Failure <15      CBC (No Diff) [[id number]]  (Abnormal) Collected: 06/17/24 1605    Specimen: Blood Updated: 06/17/24 1619     WBC 16.28 10*3/mm3      RBC 3.96 10*6/mm3      Hemoglobin 11.6 g/dL      Hematocrit 34.2 %      MCV 86.4 fL      MCH 29.3 pg      MCHC 33.9 g/dL      RDW 11.7 %      RDW-SD 36.6 fl      MPV 9.1 fL      Platelets 306 10*3/mm3                Assessment & Plan    Principal Problem:    Pregnancy         Assessment/Plan   1.  Intrauterine pregnancy at 31+2  wks gestation.      2.  Cholestasis: Bile acids have been  trending up to 60 3 days ago. They are 12 on admission.  Continue ursodiol.  Continue twice weekly BPP/NST. Patient has previously had normal right upper quadrant ultrasound.  Betamethasone administered with first dose given 2024.  PIH labs within normal limits on admission.   -GI consult pending.   -NST 3 times daily while inpatient.    3.  APLAS/SLE: Continue ASA and Lovenox.  Plan to switch to heparin at 33 weeks.  Continue Plaquenil.  Patient is following with rheumatology.  PIH labs normal on admission.  Per [facility] anti-DS DNA and complement levels per each trimester.  Continue twice-weekly  fetal surveillance, and serial growth ultrasounds.               Ami Reis MD   2024   17:00 EDT      Electronically signed by Ami Reis MD at 24 1711       Facility-Administered Medications as of 2024   Medication Dose Route Frequency Provider Last Rate Last Admin    [COMPLETED] betamethasone acetate-betamethasone sodium phosphate (CELESTONE SOLUSPAN) injection 12 mg  12 mg Intramuscular Q24H Kenisha Harrell, ZACH   12 mg at 24 1654     Lab Results (last 72 hours)       Procedure Component Value Units Date/Time    Comprehensive Metabolic Panel [[id number]]  (Abnormal) Collected: 24 1157    Specimen: Blood Updated: 24 1315     Glucose 165 mg/dL      BUN 4 mg/dL      Creatinine 0.55 mg/dL      Sodium 135 mmol/L      Potassium 3.3 mmol/L      Chloride 107 mmol/L      CO2 17.0 mmol/L      Calcium 8.8 mg/dL      Total Protein 6.4 g/dL      Albumin 3.1 g/dL      ALT (SGPT) 29 U/L      AST (SGOT) 20 U/L      Alkaline Phosphatase 167 U/L      Total Bilirubin 0.3 mg/dL      Globulin 3.3 gm/dL      A/G Ratio 0.9 g/dL      BUN/Creatinine Ratio 7.3     Anion Gap 11.0 mmol/L      eGFR 125.9 mL/min/1.73     Narrative:      GFR Normal >60  Chronic Kidney Disease <60  Kidney Failure <15      Protein / Creatinine Ratio, Urine - Urine, Clean Catch [[id number]]  Collected: 06/17/24 1604    Specimen: Urine, Clean Catch Updated: 06/17/24 1650     Protein/Creatinine Ratio, Urine 200.0 mg/G Crea      Creatinine, Urine 193.5 mg/dL      Total Protein, Urine 38.7 mg/dL     T Pallidum Antibody w/ reflex RPR (Syphilis) [[id number]]  (Normal) Collected: 06/17/24 1605    Specimen: Blood Updated: 06/17/24 1644     Treponemal AB Total Non-Reactive    Narrative:      Reactive results will reflex RPR testing.    Bile Acids, Total [[id number]]  (Abnormal) Collected: 06/17/24 1605    Specimen: Blood Updated: 06/17/24 1640     Bile Acids Total 12 umol/L     Comprehensive Metabolic Panel [[id number]]  (Abnormal) Collected: 06/17/24 1605    Specimen: Blood Updated: 06/17/24 1638     Glucose 83 mg/dL      BUN 4 mg/dL      Creatinine 0.37 mg/dL      Sodium 138 mmol/L      Potassium 3.5 mmol/L      Chloride 109 mmol/L      CO2 17.6 mmol/L      Calcium 8.8 mg/dL      Total Protein 6.6 g/dL      Albumin 3.3 g/dL      ALT (SGPT) 24 U/L      AST (SGOT) 15 U/L      Alkaline Phosphatase 184 U/L      Total Bilirubin 0.3 mg/dL      Globulin 3.3 gm/dL      A/G Ratio 1.0 g/dL      BUN/Creatinine Ratio 10.8     Anion Gap 11.4 mmol/L      eGFR 138.5 mL/min/1.73     Narrative:      GFR Normal >60  Chronic Kidney Disease <60  Kidney Failure <15      CBC (No Diff) [[id number]]  (Abnormal) Collected: 06/17/24 1605    Specimen: Blood Updated: 06/17/24 1619     WBC 16.28 10*3/mm3      RBC 3.96 10*6/mm3      Hemoglobin 11.6 g/dL      Hematocrit 34.2 %      MCV 86.4 fL      MCH 29.3 pg      MCHC 33.9 g/dL      RDW 11.7 %      RDW-SD 36.6 fl      MPV 9.1 fL      Platelets 306 10*3/mm3           Imaging Results (Last 72 Hours)       Procedure Component Value Units Date/Time    [facility] [[id number]] Collected: 06/18/24 0836     Updated: 06/18/24 1126    Narrative:      PAT NAME: SAM BLACKWELL  MED REC#: [id number]  BIRTH DA: [id number]  PAT GEND: F  ACCOUNT#: [id number]  PAT TYPE: I  EXAM KRISTINA:  [id number]  REF BRIT HOLDER  ACCESSION [id number]      Comparison Studies  =================    The findings of this study are compared to the prior ultrasound study dated       Patient Status  ============    Inpatient      Indication  ========    Cholestasis      Maternal Assessment  ==================    Height 163 cm  Height (ft) 5 ft  Height (in) 4 in      Method  =======    Transabdominal ultrasound examination. View: Suboptimal view: limited by maternal body habitusSuboptimal view: limited by late gestational age      Pregnancy  =========    Mon[place]roso pregnancy. Number of fetuses: 1      Dating  ======    Method of dating: based on stated HENNY  Prior assessment on: 2024  GA by prior assessment 31 w + 3 d  HENNY by prior assessment: 2024  Previous dating: based on stated HENNY (on 2024), selected on 2024  Agreed HENNY of previous datin2024  Assigned: based on stated HENNY (on 2024), selected on 2024  Assigned GA 31 w + 3 d  Assigned HENNY: 2024  Pregnancy length 280 d      General Evaluation  ================    Cardiac activity present.  bpm.  Fetal movements present.  Presentation cephalic.  Placenta anterior.  Umbilical cord 3 vessel cord.  Amniotic fluid Amount of AF: polyhydramnios. MVP 7.9 cm. SHARI 26.0 cm. Q1 7.9 cm, Q2 7.4 cm, Q3 4.5 cm, Q4 6.2 cm.      Fetal Anatomy  ============    4-chamber view: suboptimal  Heart / Thorax  4-chamber view: patent foramen ovale  Stomach: Appears normal  Kidneys: Appears normal  Bladder: Appears normal  Abdomen: PRUV  Gender: male  Wants to know gender: yes      Biophysical Profile  ===============    2: Fetal breathing movements  2: Gross body movements  2: Fetal tone  2: Amniotic fluid volume   Biophysical profile score      Impression  =========    Cephalic presentation.  Anterior placenta.  SHARI 26 cm, which is consistent with mild polyhydramnios.  BPP 8/8  Persistent right umbilical vein noted  today.      Recommendation  ===============    See epic consult note.      Coding  ======    Description: 34103-77 BPP without NST        Sonographer: Evelin Orr RDMS, [facility]  Physician: Beverley Patino MD    Electronically signed by: Beverley Patino MD at: 2024/06/18 11:26          ECG/EMG Results (last 72 hours)       ** No results found for the last 72 hours. **          Orders (last 72 hrs)        Start     Ordered    06/18/24 1144  Discharge patient  Once         06/18/24 1144    06/18/24 1130  Comprehensive Metabolic Panel  Once         06/18/24 1129    06/18/24 0900  Non-Formulary / Patient Supplied Medication  Daily,   Status:  Discontinued         06/17/24 1913    06/18/24 0900  Enoxaparin Sodium (LOVENOX) syringe 40 mg  Daily,   Status:  Discontinued         06/17/24 1923    06/18/24 0800  ferrous sulfate tablet 325 mg  Daily With Breakfast,   Status:  Discontinued         06/17/24 1659    06/18/24 0725  Diet: Regular/House; Fluid Consistency: Thin (IDDSI 0)  Diet Effective Now,   Status:  Canceled         06/18/24 0724    06/18/24 0702  Inpatient Maternal & Fetal Medicine Consult  IN AM,   Status:  Canceled        Specialty:  Maternal and Fetal Medicine  Provider:  Beverley Patino MD    06/17/24 2306    06/18/24 0600  pantoprazole (PROTONIX) EC tablet 40 mg  Every Early Morning,   Status:  Discontinued         06/17/24 1659    06/18/24 0556  [facility]  1 Time Imaging         06/18/24 0557    06/18/24 0050  Update Accommodation Code  Once         06/18/24 0049    06/17/24 2100  sodium chloride 0.9 % flush 10 mL  Every 12 Hours Scheduled,   Status:  Discontinued         06/17/24 1521    06/17/24 2000  Vital Signs q 4 while awake  Every 4 Hours,   Status:  Canceled      Comments: While the patient is awake.    06/17/24 1655    06/17/24 1800  Fetal Nonstress Test  3 Times Daily,   Status:  Canceled      Comments: Patient presents with:  cholestasis: DIRECT ADMIT - MFM told pt to come  to L&D for labs, GI consult, and BMZ due to increase in bile acids. +FM. Denies LOF or VB.      06/17/24 1643    06/17/24 1745  aspirin chewable tablet 81 mg  Daily,   Status:  Discontinued         06/17/24 1659 06/17/24 1745  prenatal vitamin tablet 1 tablet  Daily,   Status:  Discontinued         06/17/24 1659 06/17/24 1745  sertraline (ZOLOFT) tablet 50 mg  Daily,   Status:  Discontinued         06/17/24 1659 06/17/24 1715  ursodiol (ACTIGALL) capsule 300 mg  3 Times Daily,   Status:  Discontinued         06/17/24 1659 06/17/24 1658  hydrOXYzine (ATARAX) tablet 25 mg  Nightly PRN,   Status:  Discontinued         06/17/24 1659 06/17/24 1656  Weigh Patient Daily  Daily,   Status:  Canceled       06/17/24 1655 06/17/24 1655  Code Status and Medical Interventions:  Continuous,   Status:  Canceled         06/17/24 1655    06/17/24 1655  Update Accommodation Code  Once         06/17/24 1655    06/17/24 1655  Place Sequential Compression Device  Once         06/17/24 1655    06/17/24 1655  Maintain Sequential Compression Device  Continuous,   Status:  Canceled         06/17/24 1655 06/17/24 1655  Intermittent Auscultation FOR LOW RISK PATIENTS - NST on Admission Along With Intermittent Auscultation of Fetal Heart Tones.  Per Order Details,   Status:  Canceled        Comments: Intermittent Auscultation FOR LOW RISK PATIENTS - NST on Admission Along With Intermittent Auscultation of Fetal Heart Tones and PRN    06/17/24 1655    06/17/24 1655  NST Low risk >24 weeks:  Monitor for 30 minutes BID (Antepartum)  Once        Comments: Fetal monitoring/NST:  Antepartum >24 weeks monitor fetus 30 minutes twice daily and PRN    06/17/24 1655    06/17/24 1655  Notify Provider (Specified)  Continuous,   Status:  Canceled        Comments: Open Order Report to View Parameters Requiring Provider Notification    06/17/24 1655    06/17/24 1655  Notify Provider of Tachysystole (Per Hospital Algorithm)  Continuous,    "Status:  Canceled        Comments: Open Order Report to View Parameters Requiring Provider Notification    24  Notify Provider if Membranes Ruptured, Bleeding Greater Than 1 Pad Per Hour, Fetal Heart Tone Abnormality or Severe Pain  Continuous,   Status:  Canceled        Comments: Open Order Report to View Parameters Requiring Provider Notification    24  Initiate Group Beta Strep (GBS) Prophylaxis Protocol, If Criteria Met  Continuous,   Status:  Canceled        Comments: NO TREATMENT RECOMMENDED IF: 1) Maternal GBS Status Known Negative 2) Scheduled  Birth With Intact Membranes, Not in Labor 3) Maternal GBS Status Unknown, No Risk Factors  TREAT WITH ANTIBIOTICS IF:  1) Maternal GBS Status Known Positive 2) Maternal GBS Status Unknown With Risk Factors: a)  Previous Infant Affected By GBS Infection b) GBS Urinary Tract Infection (UTI) or Bacteriuria During Pregnancy c) Unexplained Maternal Fever (100.4F (38C) or Greater) During Labor d)  Prolonged Rupture of Membranes (18 or More Hours) e) Gestational Age Less Than 37 Weeks    245    24  Position Change - For Intra-Uterine Resusitation for Hypertonus, HyperStimulation or Non-Reassuring Fetal Status  As Needed,   Status:  Canceled       245    24 165  Position Change - For Intra-Uterine Resusitation for Hypertonus, HyperStimulation or Non-Reassuring Fetal Status  As Needed,   Status:  Canceled       245    24 165  acetaminophen (TYLENOL) tablet 650 mg  Every 4 Hours PRN,   Status:  Discontinued         245    24 165  calcium carbonate (TUMS) chewable tablet 500 mg (200 mg elemental)  Daily PRN,   Status:  Discontinued         245    24 165  ondansetron ODT (ZOFRAN-ODT) disintegrating tablet 8 mg  Every 8 Hours PRN,   Status:  Discontinued        Placed in \"Or\" Linked Group    245    24 165  " "ondansetron (ZOFRAN) injection 4 mg  Every 8 Hours PRN,   Status:  Discontinued        Placed in \"Or\" Linked Group    06/17/24 1655    06/17/24 1654  docusate sodium (COLACE) capsule 100 mg  2 Times Daily PRN,   Status:  Discontinued         06/17/24 1655    06/17/24 1654  bisacodyl (DULCOLAX) suppository 10 mg  Daily PRN,   Status:  Discontinued         06/17/24 1655    06/17/24 1645  betamethasone acetate-betamethasone sodium phosphate (CELESTONE SOLUSPAN) injection 12 mg  Every 24 Hours         06/17/24 1545    06/17/24 1644  Continuous Fetal Monitoring With NST on Admission and Prior to Initiation of Oxytocin.  Per Order Details,   Status:  Canceled        Comments: Continuous Fetal Monitoring for 2 hours on admission, if reactive can do NSTs    06/17/24 1644    06/17/24 1605  Admit To Obstetrics Inpatient  Once         06/17/24 1605    06/17/24 1545  Inpatient Gastroenterology Consult  Once        Specialty:  Gastroenterology  Provider:  Jeremie Tony MD    06/17/24 1545    06/17/24 1522  Bile Acids, Total  Once         06/17/24 1521    06/17/24 1522  T Pallidum Antibody w/ reflex RPR (Syphilis)  Once         06/17/24 1522    06/17/24 1521  Comprehensive Metabolic Panel  Once        Comments: In patients with SBP > 140 or DBP > 90, Blood      06/17/24 1521    06/17/24 1521  CBC (No Diff)  Once        Comments: In patients with SBP > 140 or DBP > 90, Blood      06/17/24 1521    06/17/24 1521  Protein / Creatinine Ratio, Urine - Urine, Clean Catch  Once         06/17/24 1521    06/17/24 1520  Measure Blood Pressure  Once        Comments: Notify Provider if Greater Than 140/90    06/17/24 1521    06/17/24 1520  Measure Heart Rate  Once         06/17/24 1521    06/17/24 1520  Check Temperature  Once         06/17/24 1521    06/17/24 1520  Check Respiratory Rate  Once         06/17/24 1521    06/17/24 1520  Continuous Fetal Monitoring With NST on Admission and Prior to Initiation of Oxytocin.  Per Order " Details,   Status:  Canceled        Comments: Continuous Fetal Monitoring With NST on Admission & Prior to Initiation of Oxytocin.    24 1521    24 1520  For Antepartum Patients Greater Than 24 Weeks - NST Daily and Monitor Fetal Heart Tones for One Hour 3 Times Daily and PRN.  Per Order Details,   Status:  Canceled        Comments: For Antepartum Patients Greater Than 24 Weeks - NST Daily & Monitor Fetal Heart Tones For One Hour 3 Times Daily & PRN.    24 1521    24 1520  External Uterine Contraction Monitoring  Per Hospital Policy,   Status:  Canceled         24 1521    24 1520  Bed Rest With Bathroom Privileges  Once         24 1521    24 1520  Cervical Check  Once        Comments: If patient is > 36 weeks and presents with complaints of labor.    24 1521    24 1520  NPO Diet NPO Type: Ice Chips  Diet Effective Now,   Status:  Canceled         24 1521    24 1520  Type & Screen  Once        Comments: In patients with SBP > 140 or DBP > 90      24 1521    24 1520  Urinalysis With Culture If Indicated -  Once,   Status:  Canceled        Comments: For any patient presenting with  contractions, UTI symptoms, back pain or other symptoms consistent with a UTI in pregnancy      24 1521    24 1520  Insert Peripheral IV  Once,   Status:  Canceled        Comments: Patient Presents With Signs / Symptoms of Shock or Acute Active Bleeding OR Chest Pain / Pressure With Shortness of Breath, Nausea, Vomiting, Diaphoresis    24 1521    24 1520  Saline Lock & Maintain IV Access  Continuous,   Status:  Canceled         24 1521    24 1520  Initiate Observation Status  Once        Comments: No chief complaint on file.        24 1521    24 1519  Oxygen Therapy- Non-Rebreather Mask; Titrate 10-15 LPM Per SpO2; 90 - 95%  Continuous PRN,   Status:  Canceled      Comments: Patient Presenting With  Respiratory Distress Related to Cardiopulmonary Illness or Disease OR  Presence of Non-Reassuring Feral Heart Rate Tracing    24 1521    24 1519  sodium chloride 0.9 % flush 10 mL  As Needed,   Status:  Discontinued         24 1521    24 1519  sodium chloride 0.9 % infusion 40 mL  As Needed,   Status:  Discontinued         24 1521    24 1519  lidocaine (XYLOCAINE) 1 % injection 0.5 mL  Once As Needed,   Status:  Discontinued         24 1521                  Operative/Procedure Notes (last 72 hours)  Notes from 06/15/24 2204 through 24   No notes of this type exist for this encounter.          Physician Progress Notes (last 72 hours)        Kenisha Harrell APRN at 24 1227          Daily Progress Note    Patient name: Carina Galindo  YOB: 1995   MRN: [id number]  Admission Date: 2024  Date of Service: 2024  Referring Provider: Ami Reis, *    Carina Galindo is a 29 y.o.    at 31w3d  admitted on 2024 for Intrahepatic cholestasis of pregnancy in third trimester    Hospital day 1      Diagnoses:   Patient Active Problem List    Diagnosis     *Intrahepatic cholestasis of pregnancy in third trimester [O26.643]     Polyhydramnios in third trimester [O40.3XX0]     Pregnancy [Z34.90]     Abnormal glucose tolerance test (GTT) during pregnancy, antepartum [O99.810]     Maternal anemia in pregnancy, antepartum [O99.019]     Obesity in pregnancy, antepartum [O99.210]     Antiphospholipid antibody syndrome complicating pregnancy- cardiolipin AB + [O99.119, D68.61]     Systemic lupus erythematosus- + anticardio AB, neg SS-A/SS-B [O99.891, M32.9]     Anxiety [F41.9]     Asthma [J45.909]        Chief Complaint:  Chief Complaint   Patient presents with    cholestasis     DIRECT ADMIT - MFM told pt to come to L&D for labs, GI consult, and BMZ due to increase in bile acids. +FM. Denies LOF or VB.       Subjective:      Carina  "has no complaints today.  No acute events overnight.  Reports fetal movement is normal  Denies leakage of amniotic fluid.  Denies vaginal bleeding    Objective:     Vitals:  Vitals:    06/17/24 2343 06/18/24 0141 06/18/24 0605 06/18/24 0821   BP: 99/44  123/71 111/71   BP Location: Right arm  Right arm Right arm   Patient Position: Lying  Sitting Sitting   Pulse: 110  90 98   Resp: 16  16 16   Temp: 98.4 °F (36.9 °C)  97.9 °F (36.6 °C) 97.9 °F (36.6 °C)   TempSrc: Oral  Oral Oral   SpO2: 96%  98% 98%   Weight:    102 kg (224 lb 1.6 oz)   Height:  162.6 cm (64\")         PHYSICAL EXAM   GENERAL: Not in acute distress, AAOx3, pleasant  CARDIO: regular rate and rhythm  PULM: symmetric chest rise, speaking in complete sentences without difficulty  NEURO: awake, alert and oriented to person, place, and time  ABDOMINAL: No fundal tenderness, no rebound or guarding, gravid  EXTREMITIES: no bilateral lower extremity edema/tenderness  SKIN: Warm, well-perfused       Non-Stress Test:    Fetal Heart Rate Assessment   Method: Fetal HR Assessment Method: external   Beats/min: Fetal HR (beats/min): 130   Baseline: Fetal HR Baseline: normal range   Variability: Fetal HR Variability: moderate (amplitude range 6 to 25 bpm)   Accels: Fetal HR Accelerations: greater than/equal to 15 bpm, lasting at least 15 seconds   Decels: Fetal HR Decelerations: absent   Tracing Category:       Uterine Assessment   Method: Method: per patient report, palpation, external tocotransducer   Frequency (min): Contraction Frequency (Minutes): x4   Duration:     Intensity: Contraction Intensity: no contractions   Resting Tone: Uterine Resting Tone: soft by palpation       Most recent ultrasound: 6/18/2024  Please view full ultrasound note on Imaging tab in [employer].  Cephalic presentation.  Anterior placenta.  SHARI 26 cm, which is consistent with mild polyhydramnios.  BPP 8/8  Persistent right umbilical vein noted today.    Medications:  aspirin, 81 mg, " Oral, Daily  betamethasone acetate-betamethasone sodium phosphate, 12 mg, Intramuscular, Q24H  enoxaparin, 40 mg, Subcutaneous, Daily  ferrous sulfate, 325 mg, Oral, Daily With Breakfast  pantoprazole, 40 mg, Oral, Q AM  prenatal vitamin, 1 tablet, Oral, Daily  sertraline, 50 mg, Oral, Daily  sodium chloride, 10 mL, Intravenous, Q12H  ursodiol, 300 mg, Oral, TID         PRN Medications:    docusate sodium    hydrOXYzine    lidocaine    ondansetron ODT **OR** ondansetron    sodium chloride    sodium chloride    Labs:  Lab Results (last 72 hours)       Procedure Component Value Units Date/Time    Comprehensive Metabolic Panel [[id number]] Collected: 06/18/24 1157    Specimen: Blood Updated: 06/18/24 1218    Protein / Creatinine Ratio, Urine - Urine, Clean Catch [[id number]] Collected: 06/17/24 1604    Specimen: Urine, Clean Catch Updated: 06/17/24 1650     Protein/Creatinine Ratio, Urine 200.0 mg/G Crea      Creatinine, Urine 193.5 mg/dL      Total Protein, Urine 38.7 mg/dL     T Pallidum Antibody w/ reflex RPR (Syphilis) [[id number]]  (Normal) Collected: 06/17/24 1605    Specimen: Blood Updated: 06/17/24 1644     Treponemal AB Total Non-Reactive    Narrative:      Reactive results will reflex RPR testing.    Bile Acids, Total [[id number]]  (Abnormal) Collected: 06/17/24 1605    Specimen: Blood Updated: 06/17/24 1640     Bile Acids Total 12 umol/L     Comprehensive Metabolic Panel [[id number]]  (Abnormal) Collected: 06/17/24 1605    Specimen: Blood Updated: 06/17/24 1638     Glucose 83 mg/dL      BUN 4 mg/dL      Creatinine 0.37 mg/dL      Sodium 138 mmol/L      Potassium 3.5 mmol/L      Chloride 109 mmol/L      CO2 17.6 mmol/L      Calcium 8.8 mg/dL      Total Protein 6.6 g/dL      Albumin 3.3 g/dL      ALT (SGPT) 24 U/L      AST (SGOT) 15 U/L      Alkaline Phosphatase 184 U/L      Total Bilirubin 0.3 mg/dL      Globulin 3.3 gm/dL      A/G Ratio 1.0 g/dL      BUN/Creatinine Ratio 10.8     Anion Gap 11.4 mmol/L      eGFR  138.5 mL/min/1.73     Narrative:      GFR Normal >60  Chronic Kidney Disease <60  Kidney Failure <15      CBC (No Diff) [[id number]]  (Abnormal) Collected: 06/17/24 1605    Specimen: Blood Updated: 06/17/24 1619     WBC 16.28 10*3/mm3      RBC 3.96 10*6/mm3      Hemoglobin 11.6 g/dL      Hematocrit 34.2 %      MCV 86.4 fL      MCH 29.3 pg      MCHC 33.9 g/dL      RDW 11.7 %      RDW-SD 36.6 fl      MPV 9.1 fL      Platelets 306 10*3/mm3           Lab Results   Component Value Date    HGB 11.6 (L) 06/17/2024     PRUV:  We discussed that the umbilical cord starts off with two umbilical arteries (right and left) and two umbilical veins (right and left). During the 6th week of gestation, the right umbilical vein is supposed to disappear and the left umbilical vein should be the only one remaining. In persistent right umbilical vein, the right vein stays open while the left vein disappears. The incidence of PRUV is 0.17%; of the babies that have PRUV, 13.5% will have other anomalies which also means that 86.5% of babies with PRUV have an isolated (no other birth defects) PRUV. Anomalies that have been associated with PRUV include cardiovascular, nervous, urinary, skeleton and respiratory anomalies. No other anomalies visualized, but she is increased risk for a heart defect for multiple reasons, so will get fetal ECHO.  She most likely falls into the 86.5% of patients that have isolated PRUV and is a normal variant. We discussed that ultrasound cannot rule out all anomalies.      Will have office schedule her for fetal echo.     Polyhydramnios, mild (SHARI 26)    She had her 1 hour GTT on 5/17 and it was 142, which is abnormal so a 3 hour glucose test was done on 05/23/24 and results were 77, 156, 91, & 93 which is normal.   Today, her US showed that her fluid was 26.   I counseled the patient about her mild polyhydramnios.  We reviewed the finding of mild polyhydramnios and potential causes and outcomes. The implications of  polyhydramnios were discussed.? Fetal anomalies and genetic diagnoses become more common with severe polyhydramnios, while maternal diabetes and idiopathic factors are more often associated with milder cases, such as with this patient.  After birth, an abnormality may be diagnosed of cases considered idiopathic and not identified prenatally. Fetal infection, anemia, and neuromuscular disorders account for some of these cases, but are most commonly found with marked polyhydramnios which is not seen today.? Polyhydramnios may also be found in fetuses with chromosome abnormalities such as aneuploidy, however, the likelihood without associated structural anomaly is likely around only 1-2%. The patient had earlier aneuploidy screening that was low risk,.    We discussed the low probability of adverse  outcome if the fluid remains only mildly elevated and there is no evidence of maternal diabetes.  We also discussed the slightly increased risk of stillbirth associated with polyhydramnios, but in the absence of diabetes or fetal malformation this risk is likely minimal.         Assessment/Plan:      Carina is a 29 y.o.    at 31w3d.    Intrahepatic Cholestasis of Pregnancy   -Actigall 500 mg 3 times a day    - Betamethasone (has received one dose and will receive second later today)   - S/P GI consult -- appreciate their care.    - Ok for discharge to home and outpatient management   - Weekly bile acids and CMP   - Recommend delivery at 37 weeks    Earlier delivery could be considered if the patient has total bile acids exceeding 100, evidence of worsening   hepatic function or unremitting pruritus not relieved by Actigall.     2.  PRUV  -Recommend Fetal Echo - will have our office schedule this   - Twice weekly ANFS outpatient management   - [facility]ST at [facility] on    - BPP at Dr Garcia office on   - Serial growth ultrasound every 4 weeks    3. APLAS/SLE   - Continue aspirin 81 mg daily   - Continue  lovenox until 33 weeks and then will switch to heparin   - Continue plaquenil   - Continue follow up care with rheumatology    4. Mild Polyhydramnios   - BPP weekly to monitor SHARI   - If continues to increase, consider paneling blood sugars.     5. Fetal: NSTs bid; BPPs twice weekly, growth q 3-4 weeks.      MODE OF DELIVERY - per fetal presentation      DISPOSITION -  Ok to discharge to home today       I have seen and examined the patient for 45 minutes. More than >50% of the time was face-to-face patient counseling. All patient questions were answered, and patient concerns addressed.  This note has been routed to the referring obstetricians/medical provider. Thank you for requesting this clinical consult.          All questions were answered to the best of my ability.    ZACH Stratton  2024    ZACH Parsons  Maternal Fetal Medicine-[facility]  Office: [phone]  [email or web address]        Electronically signed by Kenisha Harrell APRN at [phone]       Renaldo Partida MD at 24 1131            ANTEPARTUM ROUNDING NOTE     Admission date 2024     SUBJECTIVE:     Carina Galindo is a 29 y.o.,  31w3d admitted for elevated bile acids to  with diagnosis of cholestasis.  Patient had bile acids drawn through the office that were significantly elevated.  Repeat here was 12.  Patient has had consultation with maternal-fetal medicine and gastroenterology.  Patient is on Actigall 500 mg 3 times a day.  Reports her itching is controlled with medication.  Patient also has antiphospholipid antibody syndrome possible lupus and obesity.  Denies vaginal bleeding, leakage of fluid, or contractions.  The patient notes good fetal movement.     OBJECTIVE:     Vitals:   Vitals:    24 2343 24 0141 24 0605 24 0821   BP: 99/44  123/71 111/71   BP Location: Right arm  Right arm Right arm   Patient Position: Lying  Sitting Sitting   Pulse: 110  90 98  "  Resp: 16  16 16   Temp: 98.4 °F (36.9 °C)  97.9 °F (36.6 °C) 97.9 °F (36.6 °C)   TempSrc: Oral  Oral Oral   SpO2: 96%  98% 98%   Weight:    102 kg (224 lb 1.6 oz)   Height:  162.6 cm (64\")         Results from last 7 days   Lab Units 24  1605   WBC 10*3/mm3 16.28*   HEMOGLOBIN g/dL 11.6*   HEMATOCRIT % 34.2   PLATELETS 10*3/mm3 306       Fetal heart rate tracing-reactive category 1  [place]-no contractions    BPP today is 8 out of 8. SHARI 26 consistent with mild polyhydramnios    EXAM:  Appearance/Psychiatric: In no distress, patient is sitting up in bed eating lunch.  Constitutional: The patient is well nourished   Cardiovascular: She does not have edema.    Respiratory: Respiratory effort is normal.   Abdomen: Soft, gravid, and nontender   Ext: nontender, no edema.     ASSESSMENT AND PLAN:     Intrahepatic cholestasis of pregnancy in third trimester    Antiphospholipid antibody syndrome complicating pregnancy- cardiolipin AB +    Systemic lupus erythematosus- + anticardio AB, neg SS-A/SS-B    Obesity in pregnancy, antepartum    Maternal anemia in pregnancy, antepartum    Pregnancy    29-year-old  1 para 0 at 31-3/7 weeks    Cholestasis-patient's bile acids were increased on office labs.  Repeat check here was 12.  Patient's liver enzymes are normal.  She is on Actigall 500 mg 3 times a day.  Her symptoms are controlled.  GI has seen the patient and had no further recommendations.  Patient is also seen maternal-fetal medicine.  She is receiving a betamethasone course.  Recommendation was for twice-weekly testing with delivery at 37 weeks.  Earlier delivery could be considered if the patient has total bile acids exceeding 100, evidence of worsening hepatic function or unremitting pruritus not relieved by Actigall.  They recommended twice-weekly testing.  BPP and NST are reassuring here today.  Patient is scheduled to see Dr. Hollins on Friday and has follow-up early next week with MFM.  We discussed kick " counting.  Weekly bile acids and CMP were recommended.  Antiphospholipid antibody syndrome and possible lupus.  Patient's platelet count is normal.  Continue Lovenox and low-dose aspirin.  Plan is to convert to heparin at 33 weeks.  Continue with Plaquenil and follow-up with urology.  Plan to discharge home later today after second betamethasone injection.  Patient will keep follow-up with Dr. Hollins on Friday.    Electronically signed by Renaldo Partida MD at 06/18/24 1142          Consult Notes (last 72 hours)        Piter Jimenez PA-C at 06/18/24 1054        Consult Orders    1. Inpatient Gastroenterology Consult [[id number]] ordered by Ami Reis MD at 06/17/24 3252              Attestation signed by Inocencio Mccracken MD at 06/18/24 5042    I have reviewed this documentation and agree.                  [facility]  Initial Inpatient Consult Note    Referring Provider: Dr. Reis    Reason for Consultation: cholestasis     Subjective     History of present illness:    29 y.o. female, w/ PMHx of antiphospholipid antibody syndrome, who is 31 weeks pregnant admitted w/ concern for increasing bile acids. Patient reports she first started having pruritus couple months ago, has been on ursodiol which has helped with pruritus. Most recently, she was noted to have worsening bile acid levels at 60.7 on 06/14. Since then, they have improved to 12 currently. She denies abdominal pain, fever, nausea/vomiting, pruritus.     Ultrasound 6/9/2024:   Mild R hydronephrosis, but otherwise normal liver. No biliary ductal dilatation. Hx of CCY.     Past Medical History:  Past Medical History:   Diagnosis Date    Antiphospholipid syndrome     Anxiety     exercise induced asthma     GERD (gastroesophageal reflux disease) 2010    Iron deficiency anemia     Knee meniscus pain, left     Liver cyst     BX AND FOUND TO BE NEGATIVE    Lupus     OCD (obsessive compulsive disorder)     Rheumatoid factor positive       Past Surgical History:  Past Surgical History:   Procedure Laterality Date    ADENOIDECTOMY      CHOLECYSTECTOMY WITH INTRAOPERATIVE CHOLANGIOGRAM N/A 07/14/2016    Procedure: CHOLECYSTECTOMY LAPAROSCOPIC INTRAOPERATIVE CHOLANGIOGRAM;  Surgeon: Malathi Lozano MD;  Location: [facility] OR [facility] [place];  Service:     COLONOSCOPY  2010    ENDOSCOPY  11/01/2013    Gastritis     KNEE ARTHROSCOPY Left 3/28/2023    Procedure: Knee Arthroscopy with lateral release, meniscectomy and cyst excision;  Surgeon: Tapan Craig MD;  Location: [facility] OR [facility] [place];  Service: Orthopedics;  Laterality: Left;    NASAL SEPTUM SURGERY      SHOULDER SURGERY Right 2016    TONSILLECTOMY      x 2 due to regrowth    UPPER GASTROINTESTINAL ENDOSCOPY  2010    WISDOM TOOTH EXTRACTION        Social History:   Social History     Tobacco Use    Smoking status: Never     Passive exposure: Never    Smokeless tobacco: Never   Substance Use Topics    Alcohol use: No      Family History:  Family History   Problem Relation Age of Onset    Diabetes Father     Gallbladder disease Mother 30    Rheum arthritis Mother     Hypertension Mother     Endometriosis Mother     Irritable bowel syndrome Mother     Pancreatitis Mother     Breast cancer Maternal Grandmother     Heart disease Maternal Grandfather     Breast cancer Maternal Aunt     Congenital heart disease Cousin     Malig Hyperthermia Neg Hx     Deep vein thrombosis Neg Hx     Pulmonary embolism Neg Hx        Home Meds:  Medications Prior to Admission   Medication Sig Dispense Refill Last Dose    acetaminophen (TYLENOL) 325 MG tablet Take 1 tablet by mouth Every 6 (Six) Hours As Needed.   Past Month    aspirin (ASPIR) 81 MG EC tablet Take one tablet by mouth daily until 36 weeks gestation. 90 tablet 3 6/16/2024    diphenhydrAMINE (BENADRYL) 25 mg capsule Take 1 capsule by mouth Every 6 (Six) Hours As Needed for Itching.   Past Month    Enoxaparin Sodium (LOVENOX) 40 MG/0.4ML solution prefilled syringe syringe  Inject 0.4 mL under the skin into the appropriate area as directed Daily. 12 mL 11 6/16/2024    ferrous sulfate 325 (65 FE) MG tablet Take 1 tablet by mouth Daily With Breakfast. 90 tablet 2 6/17/2024    pantoprazole (Protonix) 40 MG EC tablet Take 1 tablet by mouth Daily. 90 tablet 2 6/16/2024    Prenatal Vit-Fe Fumarate-FA (prenatal vitamin 27-0.8) 27-0.8 MG tablet tablet Take  by mouth Daily.   6/16/2024    sertraline (ZOLOFT) 50 MG tablet    6/16/2024    ursodiol (ACTIGALL) 500 MG tablet Take 1 tablet by mouth 3 (Three) Times a Day. 90 tablet 11 6/17/2024    albuterol (VENTOLIN HFA) 108 (90 Base) MCG/ACT inhaler Inhale 2 puffs Every 4 (Four) Hours As Needed for Wheezing. 18 g 1 More than a month    Alcohol Swabs 70 % pads Use 1 Swab Daily. 100 each 11     doxylamine-pyridoxine ER (Bonjesta) 20-20 MG tablet controlled-release tablet Take 1 tablet by mouth 2 (Two) Times a Day. 60 tablet 0     EPINEPHrine (EPIPEN) 0.3 MG/0.3ML solution auto-injector injection        hydroxychloroquine (PLAQUENIL) 200 MG tablet Take 1 tablet by mouth Daily.       Polysaccharide Iron Complex (ProFe) 391.3 (180 Fe) MG capsule Take  by mouth.        Current Meds:   aspirin, 81 mg, Oral, Daily  betamethasone acetate-betamethasone sodium phosphate, 12 mg, Intramuscular, Q24H  enoxaparin, 40 mg, Subcutaneous, Daily  ferrous sulfate, 325 mg, Oral, Daily With Breakfast  pantoprazole, 40 mg, Oral, Q AM  prenatal vitamin, 1 tablet, Oral, Daily  sertraline, 50 mg, Oral, Daily  sodium chloride, 10 mL, Intravenous, Q12H  ursodiol, 300 mg, Oral, TID      Allergies:  Allergies   Allergen Reactions    Egg-Derived Products Swelling     Eyes swell shut, has some eggs cooked in food.    Other Other (See Comments)     FOOD ALLERGIES: see list : peanuts and ranch dressing      Shellfish-Derived Products Anaphylaxis    Nitrofurantoin Unknown (See Comments) and Rash     Unsure    Unsure    Peanut Butter Flavor [Flavoring Agent] Hives    Ciprofloxacin Rash     Erythromycin Rash     As a baby, no interaction since    Ibuprofen Rash     Has tolerated low dose in the past    Multihance [Gadobenate] Hives     Patient experienced one small hive on neck. Was instructed to be pre-medicated per radiologist prior to MRI's for future.     Sulfa Antibiotics Rash    Triamcinolone Rash       Objective     Vital Signs  Temp:  [97.9 °F (36.6 °C)-98.5 °F (36.9 °C)] 97.9 °F (36.6 °C)  Heart Rate:  [] 98  Resp:  [15-18] 16  BP: ()/(44-88) 111/71  Physical Exam:  General Appearance:     Alert, cooperative, in no acute distress   Abdomen:     Distended.    Rectal:     Deferred       Results Review:   I reviewed the patient's new clinical results.    Results from last 7 days   Lab Units 06/17/24  1605   WBC 10*3/mm3 16.28*   HEMOGLOBIN g/dL 11.6*   HEMATOCRIT % 34.2   PLATELETS 10*3/mm3 306     Results from last 7 days   Lab Units 06/17/24  1605 06/14/24  1213   SODIUM mmol/L 138 138   POTASSIUM mmol/L 3.5 3.6   CHLORIDE mmol/L 109* 106   CO2 mmol/L 17.6* 21   BUN mg/dL 4* 2*   CREATININE mg/dL 0.37* 0.43*   CALCIUM mg/dL 8.8 8.9   BILIRUBIN mg/dL 0.3 0.3   ALK PHOS U/L 184* 175*   ALT (SGPT) U/L 24 30   AST (SGOT) U/L 15 20   GLUCOSE mg/dL 83 75         Lab Results   Lab Value Date/Time    LIPASE 54 05/23/2019 1041    LIPASE 58 06/25/2016 0052       Radiology:  [facility]    (Results Pending)         Assessment:   31 weeks pregnancy  Cholestasis  Elevated bile acids    Plan:   Significantly improved bile acids from 60.7 on 06/14, to 12 currently. She is on ursodiol and tolerating well. Asymptomatic- no pruritus, abd pain, fever. Discussed w/ Dr. Mccracken- no GI intervention needed at this time.   Repeat LFTs today.   Continue to trend bile acids at least weekly. Continue ursodiol    I discussed the patients findings and my recommendations with patient.         Piter Jimenez PA-C  [facility]  [address] [place] [address]  [address]  [place] [address]  Office: [phone]        Electronically signed by Inocencio Mccracken MD at 06/18/24 6957

## 2024-06-21 ENCOUNTER — ROUTINE PRENATAL (OUTPATIENT)
Dept: OBSTETRICS AND GYNECOLOGY | Age: 29
End: 2024-06-21
Payer: COMMERCIAL

## 2024-06-21 ENCOUNTER — LAB (OUTPATIENT)
Dept: LAB | Facility: HOSPITAL | Age: 29
End: 2024-06-21
Payer: COMMERCIAL

## 2024-06-21 VITALS — SYSTOLIC BLOOD PRESSURE: 120 MMHG | WEIGHT: 224 LBS | DIASTOLIC BLOOD PRESSURE: 68 MMHG | BODY MASS INDEX: 38.45 KG/M2

## 2024-06-21 DIAGNOSIS — O99.019 MATERNAL ANEMIA IN PREGNANCY, ANTEPARTUM: ICD-10-CM

## 2024-06-21 DIAGNOSIS — O99.119 ANTIPHOSPHOLIPID ANTIBODY SYNDROME COMPLICATING PREGNANCY: ICD-10-CM

## 2024-06-21 DIAGNOSIS — D68.61 ANTIPHOSPHOLIPID ANTIBODY SYNDROME COMPLICATING PREGNANCY: ICD-10-CM

## 2024-06-21 DIAGNOSIS — F41.9 ANXIETY: ICD-10-CM

## 2024-06-21 DIAGNOSIS — M32.9 SYSTEMIC LUPUS ERYTHEMATOSUS, MATERNAL, ANTEPARTUM: ICD-10-CM

## 2024-06-21 DIAGNOSIS — O99.891 SYSTEMIC LUPUS ERYTHEMATOSUS, MATERNAL, ANTEPARTUM: ICD-10-CM

## 2024-06-21 DIAGNOSIS — O99.210 OBESITY IN PREGNANCY, ANTEPARTUM: ICD-10-CM

## 2024-06-21 DIAGNOSIS — Z34.03 ENCOUNTER FOR PRENATAL CARE IN THIRD TRIMESTER OF FIRST PREGNANCY: Primary | ICD-10-CM

## 2024-06-21 DIAGNOSIS — O26.643 INTRAHEPATIC CHOLESTASIS OF PREGNANCY IN THIRD TRIMESTER: ICD-10-CM

## 2024-06-21 DIAGNOSIS — Z13.89 SCREENING FOR BLOOD OR PROTEIN IN URINE: ICD-10-CM

## 2024-06-21 LAB
ALBUMIN SERPL-MCNC: 3.3 G/DL (ref 3.5–5.2)
ALBUMIN/GLOB SERPL: 1 G/DL
ALP SERPL-CCNC: 158 U/L (ref 39–117)
ALT SERPL W P-5'-P-CCNC: 42 U/L (ref 1–33)
ANION GAP SERPL CALCULATED.3IONS-SCNC: 9.6 MMOL/L (ref 5–15)
AST SERPL-CCNC: 25 U/L (ref 1–32)
BILE AC SERPL-SCNC: 35 UMOL/L (ref 0–10)
BILIRUB BLD-MCNC: NEGATIVE MG/DL
BILIRUB SERPL-MCNC: 0.2 MG/DL (ref 0–1.2)
BUN SERPL-MCNC: 5 MG/DL (ref 6–20)
BUN/CREAT SERPL: 11.1 (ref 7–25)
C3 SERPL-MCNC: 138 MG/DL (ref 82–167)
C4 SERPL-MCNC: 19 MG/DL (ref 14–44)
CALCIUM SPEC-SCNC: 8.7 MG/DL (ref 8.6–10.5)
CHLORIDE SERPL-SCNC: 106 MMOL/L (ref 98–107)
CO2 SERPL-SCNC: 20.4 MMOL/L (ref 22–29)
CREAT SERPL-MCNC: 0.45 MG/DL (ref 0.57–1)
DEPRECATED RDW RBC AUTO: 37.8 FL (ref 37–54)
EGFRCR SERPLBLD CKD-EPI 2021: 132.1 ML/MIN/1.73
ERYTHROCYTE [DISTWIDTH] IN BLOOD BY AUTOMATED COUNT: 11.8 % (ref 12.3–15.4)
GLOBULIN UR ELPH-MCNC: 3.3 GM/DL
GLUCOSE SERPL-MCNC: 76 MG/DL (ref 65–99)
GLUCOSE UR STRIP-MCNC: NEGATIVE MG/DL
HCT VFR BLD AUTO: 34 % (ref 34–46.6)
HGB BLD-MCNC: 11.4 G/DL (ref 12–15.9)
KETONES UR QL: NEGATIVE
LDH SERPL-CCNC: 166 U/L (ref 135–214)
LEUKOCYTE EST, POC: ABNORMAL
MCH RBC QN AUTO: 29.5 PG (ref 26.6–33)
MCHC RBC AUTO-ENTMCNC: 33.5 G/DL (ref 31.5–35.7)
MCV RBC AUTO: 88.1 FL (ref 79–97)
NITRITE UR-MCNC: NEGATIVE MG/ML
PH UR: 6.5 [PH] (ref 5–8)
PLATELET # BLD AUTO: 359 10*3/MM3 (ref 140–450)
PMV BLD AUTO: 9.1 FL (ref 6–12)
POTASSIUM SERPL-SCNC: 3.4 MMOL/L (ref 3.5–5.2)
PROT SERPL-MCNC: 6.6 G/DL (ref 6–8.5)
PROT UR STRIP-MCNC: ABNORMAL MG/DL
RBC # BLD AUTO: 3.86 10*6/MM3 (ref 3.77–5.28)
RBC # UR STRIP: NEGATIVE /UL
SODIUM SERPL-SCNC: 136 MMOL/L (ref 136–145)
SP GR UR: 1.02 (ref 1–1.03)
UROBILINOGEN UR QL: ABNORMAL
WBC NRBC COR # BLD AUTO: 16.4 10*3/MM3 (ref 3.4–10.8)

## 2024-06-21 PROCEDURE — 86225 DNA ANTIBODY NATIVE: CPT

## 2024-06-21 PROCEDURE — 82239 BILE ACIDS TOTAL: CPT

## 2024-06-21 PROCEDURE — 86160 COMPLEMENT ANTIGEN: CPT

## 2024-06-21 PROCEDURE — 83615 LACTATE (LD) (LDH) ENZYME: CPT | Performed by: OBSTETRICS & GYNECOLOGY

## 2024-06-21 PROCEDURE — 80053 COMPREHEN METABOLIC PANEL: CPT

## 2024-06-21 PROCEDURE — 36415 COLL VENOUS BLD VENIPUNCTURE: CPT

## 2024-06-21 PROCEDURE — 85027 COMPLETE CBC AUTOMATED: CPT | Performed by: OBSTETRICS & GYNECOLOGY

## 2024-06-21 RX ORDER — CHOLESTYRAMINE 4 G/9G
1 POWDER, FOR SUSPENSION ORAL DAILY
Qty: 20 PACKET | Refills: 2 | Status: SHIPPED | OUTPATIENT
Start: 2024-06-21

## 2024-06-21 NOTE — PROGRESS NOTES
Chief Complaint   Patient presents with    Routine Prenatal Visit     Cc: ob check with BPP Us , no ob complaints , reports good FM , still having upper quadrant pain        HPI: 29 y.o.  at 31w6d presents for prenatal care    Last OB US growth (since 2024)       None          Vitals:    24 0836   BP: 120/68   Weight: 102 kg (224 lb)     Total weight gain for pregnancy:  4.536 kg (10 lb)    Review of systems:   Gen: negative  CV:     negative  GI: negative  :   negative and good fetal movement noted   MS:    negative  Neuro: negative  Pul: negative    Physical Exam  Vitals and nursing note reviewed.   Constitutional:       Appearance: Normal appearance.   Pulmonary:      Effort: Pulmonary effort is normal.   Neurological:      Mental Status: She is alert and oriented to person, place, and time.   Psychiatric:         Mood and Affect: Mood normal.         Thought Content: Thought content normal.         Judgment: Judgment normal.           A/P  1. Intrauterine pregnancy at 31w6d   2. Pregnancy Risk:  HIGH RISK    Diagnoses and all orders for this visit:    1. Encounter for prenatal care in third trimester of first pregnancy (Primary)    2. Screening for blood or protein in urine  -     POC Urinalysis Dipstick    3. Maternal anemia in pregnancy, antepartum    4. Anxiety    5. Systemic lupus erythematosus- + anticardio AB, neg SS-A/SS-B  -     Cancel: C4+C3  -     Cancel: Anti-DNA Antibody, Double-stranded  -     C4+C3; Future  -     Anti-DNA Antibody, Double-stranded; Future    6. Obesity in pregnancy, antepartum    7. Intrahepatic cholestasis of pregnancy in third trimester  -     Cancel: Comprehensive Metabolic Panel  -     Cancel: Bile Acids, Total  -     Comprehensive Metabolic Panel; Future  -     Bile Acids, Total; Future    8. Antiphospholipid antibody syndrome complicating pregnancy- cardiolipin AB +  -     C4+C3; Future  -     Anti-DNA Antibody, Double-stranded; Future        Pre-eclampsia  symptoms were discussed and warnings were given.   labor was discussed.  Warnings were provided.  Nutrition and weight gain were addressed.  -----------------------  PLAN:   Return in about 1 week (around 2024), or ob check and BPP.  Cholestasis-BPP 8 out of 8  Polyhydramnios-SHARI 29 today  Check bile acids and CMP today  Lupus-continue Lovenox-Labs today    Tammy Hollins MD  2024 09:09 EDT

## 2024-06-24 LAB — DSDNA AB SER-ACNC: 1 IU/ML (ref 0–9)

## 2024-06-25 ENCOUNTER — OFFICE VISIT (OUTPATIENT)
Dept: OBSTETRICS AND GYNECOLOGY | Facility: CLINIC | Age: 29
End: 2024-06-25
Payer: COMMERCIAL

## 2024-06-25 ENCOUNTER — HOSPITAL ENCOUNTER (OUTPATIENT)
Dept: ULTRASOUND IMAGING | Facility: HOSPITAL | Age: 29
Discharge: HOME | End: 2024-06-25
Admitting: OBSTETRICS & GYNECOLOGY
Payer: COMMERCIAL

## 2024-06-25 VITALS
BODY MASS INDEX: 38.58 KG/M2 | HEIGHT: 64 IN | HEART RATE: 98 BPM | DIASTOLIC BLOOD PRESSURE: 79 MMHG | SYSTOLIC BLOOD PRESSURE: 117 MMHG | TEMPERATURE: 98.4 F | WEIGHT: 226 LBS

## 2024-06-25 DIAGNOSIS — M32.9 SYSTEMIC LUPUS ERYTHEMATOSUS, MATERNAL, ANTEPARTUM: ICD-10-CM

## 2024-06-25 DIAGNOSIS — O40.3XX0 POLYHYDRAMNIOS IN THIRD TRIMESTER COMPLICATION, SINGLE OR UNSPECIFIED FETUS: ICD-10-CM

## 2024-06-25 DIAGNOSIS — O26.643 CHOLESTASIS OF PREGNANCY IN THIRD TRIMESTER: Primary | ICD-10-CM

## 2024-06-25 DIAGNOSIS — D68.61 ANTIPHOSPHOLIPID ANTIBODY SYNDROME COMPLICATING PREGNANCY: ICD-10-CM

## 2024-06-25 DIAGNOSIS — O99.119 ANTIPHOSPHOLIPID ANTIBODY SYNDROME COMPLICATING PREGNANCY: ICD-10-CM

## 2024-06-25 DIAGNOSIS — O99.891 SYSTEMIC LUPUS ERYTHEMATOSUS, MATERNAL, ANTEPARTUM: ICD-10-CM

## 2024-06-25 PROCEDURE — 76816 OB US FOLLOW-UP PER FETUS: CPT

## 2024-06-25 PROCEDURE — 76819 FETAL BIOPHYS PROFIL W/O NST: CPT | Performed by: OBSTETRICS & GYNECOLOGY

## 2024-06-25 PROCEDURE — 76819 FETAL BIOPHYS PROFIL W/O NST: CPT

## 2024-06-25 PROCEDURE — 76816 OB US FOLLOW-UP PER FETUS: CPT | Performed by: OBSTETRICS & GYNECOLOGY

## 2024-06-25 PROCEDURE — 99214 OFFICE O/P EST MOD 30 MIN: CPT | Performed by: OBSTETRICS & GYNECOLOGY

## 2024-06-25 NOTE — LETTER
June 25, 2024     Patient: Carina Galindo   YOB: 1995   Date of Visit: 6/25/2024       To Whom It May Concern:    It is my medical opinion that Carina Galindo needs to be able to work from home for the remainder of her pregnancy.  Her due date is 8/17/2024.  Please feel free to reach out if questions or concerns.           Sincerely,        Beverley Patino MD    CC: No Recipients

## 2024-06-25 NOTE — PROGRESS NOTES
MATERNAL FETAL MEDICINE Consult Note    Dear Dr Pauline Clements MD:    Thank you for your kind referral of Carina Galindo.  As you know, she is a 29 y.o.   at  32 3/7 weeks gestation (Estimated Date of Delivery: 24). This is a consult.      Her antepartum course is complicated by:  SLE  APLAS  Cholestasis  Mild polyhydramnios    Aneuploidy Screening: low risk    HPI: Today, she denies headache, blurry vision, RUQ pain. No vaginal bleeding, no contractions.     Review of History:  Past Medical History:   Diagnosis Date    Antiphospholipid syndrome     Anxiety     exercise induced asthma     GERD (gastroesophageal reflux disease)     Iron deficiency anemia     Knee meniscus pain, left     Liver cyst     BX AND FOUND TO BE NEGATIVE    Lupus     OCD (obsessive compulsive disorder)     Rheumatoid factor positive      Past Surgical History:   Procedure Laterality Date    ADENOIDECTOMY      CHOLECYSTECTOMY WITH INTRAOPERATIVE CHOLANGIOGRAM N/A 2016    Procedure: CHOLECYSTECTOMY LAPAROSCOPIC INTRAOPERATIVE CHOLANGIOGRAM;  Surgeon: Malathi Lozano MD;  [place] [facility] [place] [facility] [place]  Service:     COLONOSCOPY  2010    ENDOSCOPY  2013    Gastritis     KNEE ARTHROSCOPY Left 3/28/2023    Procedure: Knee Arthroscopy with lateral release, meniscectomy and cyst excision;  Surgeon: Tapan Craig MD;  [place] [facility] [place] [facility] [place]  Service: Orthopedics;  Laterality: Left;    NASAL SEPTUM SURGERY      SHOULDER SURGERY Right 2016    TONSILLECTOMY      x 2 due to regrowth    UPPER GASTROINTESTINAL ENDOSCOPY  2010    WISDOM TOOTH EXTRACTION         Social History     Socioeconomic History    Marital status:     Number of children: 0   Tobacco Use    Smoking status: Never     Passive exposure: Never    Smokeless tobacco: Never   Vaping Use    Vaping status: Never Used   Substance and Sexual Activity    Alcohol use: No    Drug use: Never    Sexual activity: Yes     Partners: Male     Birth control/protection:  None     Family History   Problem Relation Age of Onset    Diabetes Father     Gallbladder disease Mother 30    Rheum arthritis Mother     Hypertension Mother     Endometriosis Mother     Irritable bowel syndrome Mother     Pancreatitis Mother     Breast cancer Maternal Grandmother     Heart disease Maternal Grandfather     Breast cancer Maternal Aunt     Congenital heart disease Cousin     Malsarah Hyperthermia Neg Hx     Deep vein thrombosis Neg Hx     Pulmonary embolism Neg Hx       Allergies   Allergen Reactions    Egg-Derived Products Swelling     Eyes swell shut, has some eggs cooked in food.    Other Other (See Comments)     FOOD ALLERGIES: see list : peanuts and ranch dressing      Shellfish-Derived Products Anaphylaxis    Nitrofurantoin Unknown (See Comments) and Rash     Unsure    Unsure    Peanut Butter Flavor [Flavoring Agent] Hives    Ciprofloxacin Rash    Erythromycin Rash     As a baby, no interaction since    Ibuprofen Rash     Has tolerated low dose in the past    Multihance [Gadobenate] Hives     Patient experienced one small hive on neck. Was instructed to be pre-medicated per radiologist prior to MRI's for future.     Sulfa Antibiotics Rash    Triamcinolone Rash      Current Outpatient Medications on File Prior to Visit   Medication Sig Dispense Refill    acetaminophen (TYLENOL) 325 MG tablet Take 1 tablet by mouth Every 6 (Six) Hours As Needed.      albuterol (VENTOLIN HFA) 108 (90 Base) MCG/ACT inhaler Inhale 2 puffs Every 4 (Four) Hours As Needed for Wheezing. 18 g 1    Alcohol Swabs 70 % pads Use 1 Swab Daily. 100 each 11    aspirin (ASPIR) 81 MG EC tablet Take one tablet by mouth daily until 36 weeks gestation. 90 tablet 3    cholestyramine (Questran) 4 g packet Take 1 packet by mouth Daily. 20 packet 2    diphenhydrAMINE (BENADRYL) 25 mg capsule Take 1 capsule by mouth Every 6 (Six) Hours As Needed for Itching.      doxylamine-pyridoxine ER (Bonjesta) 20-20 MG tablet controlled-release tablet  "Take 1 tablet by mouth 2 (Two) Times a Day. 60 tablet 0    Enoxaparin Sodium (LOVENOX) 40 MG/0.4ML solution prefilled syringe syringe Inject 0.4 mL under the skin into the appropriate area as directed Daily. 12 mL 11    EPINEPHrine (EPIPEN) 0.3 MG/0.3ML solution auto-injector injection       ferrous sulfate 325 (65 FE) MG tablet Take 1 tablet by mouth Daily With Breakfast. 90 tablet 2    hydroxychloroquine (PLAQUENIL) 200 MG tablet Take 1 tablet by mouth Daily.      pantoprazole (Protonix) 40 MG EC tablet Take 1 tablet by mouth Daily. 90 tablet 2    Polysaccharide Iron Complex (ProFe) 391.3 (180 Fe) MG capsule Take  by mouth.      Prenatal Vit-Fe Fumarate-FA (prenatal vitamin 27-0.8) 27-0.8 MG tablet tablet Take  by mouth Daily.      sertraline (ZOLOFT) 50 MG tablet       ursodiol (ACTIGALL) 500 MG tablet Take 1 tablet by mouth 3 (Three) Times a Day. 90 tablet 11     No current facility-administered medications on file prior to visit.        Past obstetric, gynecological, medical, surgical, family and social history reviewed.  Relevant lab work and imaging reviewed.    Review of systems  Constitutional:  denies fever, chills, malaise.   ENT/Mouth:  denies sore throat, tinnitus  Eyes: denies vision changes/pain  CV:  denies chest pain  Respiratory:  denies cough/SOB  GI:  denies N/V, diarrhea, abdominal pain.    :   denies dysuria  Skin:  denies lesions or pruritus   Neuro:  denies weakness, focal neurologic symptoms    Vitals:    06/25/24 1001   BP: 117/79   BP Location: Right arm   Patient Position: Sitting   Pulse: 98   Temp: 98.4 °F (36.9 °C)   TempSrc: Temporal   Weight: 103 kg (226 lb)   Height: 162.6 cm (64\")         PHYSICAL EXAM   GENERAL: Not in acute distress, AAOx3, pleasant  CARDIO: regular rate and rhythm  PULM: symmetric chest rise, speaking in complete sentences without difficulty  NEURO: awake, alert and oriented to person, place, and time  ABDOMINAL: No fundal tenderness, no rebound or guarding, " gravid  EXTREMITIES: no bilateral lower extremity edema/tenderness  SKIN: Warm, well-perfused    US:  Cephalic presentation.  Anterior placenta.  SHARI 26 cm, consistent with mild stable polyhydramnios.   EFW 2363 g (72%, AC 96%)  BPP 8/8  PRUV visualized.      ASSESSMENT/COUNSELIN y.o.   at  32 3/7 weeks gestation (Estimated Date of Delivery: 24).     -Pregnancy  [ X ] stable  [   ] improving [  ] worsening    Diagnoses and all orders for this visit:    1. Cholestasis of pregnancy in third trimester (Primary)  -     Bile Acids, Total; Standing  -     CBC & Differential; Future  -     Comprehensive Metabolic Panel; Standing  -     Protein / Creatinine Ratio, Urine - Urine, Clean Catch; Future    2. Systemic lupus erythematosus- + anticardio AB, neg SS-A/SS-B    3. Antiphospholipid antibody syndrome complicating pregnancy- cardiolipin AB +    4. Polyhydramnios in third trimester complication, single or unspecified fetus        SLE:  Previously counseled.  Patient doesn't have an official dx of SLE, but began having joint symptoms with swelling right before she got pregnant.  Her mother has a diagnosis of RA.  She has seen rheumatology and met some but not all criteria for Lupus.  However, she is APLAS positive.  Given her symptoms/constellation of findings, she likely has Lupus or a similar autoimmune issue with similar risk factors during pregnancy, so we will treat her like lupus.  She is on plaquenil and doing well on 200 a day.  Her flares involve her joints and she has never had cardiac, renal, or neuro manifestations.  Her baseline P/C is 116 and BL Cr is 0.61 prepregnancy.  Platelets 431 earlier this year.  She was negative for Ro/La antibodies earlier this year.        I recommend she continue following with her rheumatologist as they recommend and have complement levels and Anti-DS DNA levels each trimester. These will be helpful for comparison if she develops a flare or concern for  preeclampsia during this pregnancy.  She will need serial growth exams and twice weekly ANFS starting by 32 weeks between me and her primary OB.      PIH labs appropriate.     Cholestasis:  Previously counseled.    No itching--pt did not tolerate cholestyramine so will stop.  She has no itching.    Her bile acid is rising despite compliance with her ursodiol 500 TID.  Would recommend weekly assessment. She needs a follow up bile acid and CMP on --ordered.  Will get PIH labs this Friday, as well. RUQ US and GI consult reassuring.       The rate of stillbirth after 37 weeks at baseline is 0.1-0.3% in the [place], but for cholestasis, the incidence is approximately 1-2%.  She has intermittently had severe cholestasis based on bile acids.     In a prospective cohort study evaluating patients affected by ICP with total bile acid levels of 40 mmol/L and higher, it was found that the aOR for stillbirth was 2.58 for adverse fetal outcome when compared with the baseline data of patients without cholestasis.  Would recommend delivery 36 weeks right now and if continues to rise/pt has other symptoms/rising liver enzymes, would recommend delivery after 34 weeks.      The pathophysiology of stillbirth is poorly understood but is likely related to fetal arrythmia or vasospasm of placental vessels.  There is also evidence of increased risk of pre-eclampsia positively correlated with bile acid level.  Women with bile acids >40 are at highest risk with dx of pre-eclampsia often occurring 2-4 weeks after diagnosis of cholestasis.      I recommend 2x per week  fetal surveillance for this diagnosis starting by 32 weeks--we will see her weekly at 28 (definitely 2x per week if bile acids over 40).      Delivery between 34 and 36 weeks of gestation can be considered in women with ICP, with total bile acid levels of100 mmol/L, and with any of the followin) excruciating/ unremitting maternal pruritus not  relieved with  ursodiol  2)Hx of stillbirth due to ICP  3)Evidence of worsening hepatic function    Discussed risks fetal problems due to Cholestasis including fetal arrhthmias and even fetal death.  Therefore the recommendation is close (biweekly) fetal monitoring.  We will continue to follow.        APLAS with no personal history of VTE  I discussed with the patient her diagnosis of APLAS, which is based on lupus and her positive lab tests.  We reviewed the risks of pregnancy with APLAS including but not limited to miscarriage, stillbirth, fetal growth restriction, preeclampsia, indicated  birth, and venous thromboembolic event.    We discussed decreasing the risk of these complications with LMWH and baby aspirin throughout pregnancy and for 4-6 weeks post partum.  We also recommend serial growth ultrasound and ANFS twice weekly beyond 32 weeks.    PRUV:  We discussed that the umbilical cord starts off with two umbilical arteries (right and left) and two umbilical veins (right and left). During the 6th week of gestation, the right umbilical vein is supposed to disappear and the left umbilical vein should be the only one remaining. In persistent right umbilical vein, the right vein stays open while the left vein disappears. The incidence of PRUV is 0.17%; of the babies that have PRUV, 13.5% will have other anomalies which also means that 86.5% of babies with PRUV have an isolated (no other birth defects) PRUV. Anomalies that have been associated with PRUV include cardiovascular, nervous, urinary, skeleton and respiratory anomalies. No other anomalies visualized, but she is increased risk for a heart defect for multiple reasons, so will get fetal ECHO.  She most likely falls into the 86.5% of patients that have isolated PRUV and is a normal variant. We discussed that ultrasound cannot rule out all anomalies.      Polyhydramnios:  Passed 3 hour.  Mild and stable.  If continues next week, will have pt pattern.        Summary  of Plan  -Serial growth ultrasounds every 4 weeks (MFM)   -Starting at 32 weeks: twice Weekly fetal  surveillance until delivery--we will do NSTs here on  and BPP's at Dr. Hollins's office on .  We will switch the NST for a growth every 4 weeks.    -Continue ursodiol and repeat bile acids/CMP weekly now that rising.    -Continue ASA and Lovenox--switch to heparin at 34 weeks (heparin 10,000 u BID)  -Continue plaquenil and following with rheum  -Delivery 34-36 weeks for APLAS, anticoagulation, lupus, cholestasis--36 weeks right now but low threshold after 34 if changes occur.    Follow-up: weekly    Thank you for the consult and opportunity to care for this patient.  Please feel free to reach out with any questions or concerns.      I spent 35 minutes caring for this patient on this date of service. This time includes time spent by me in the following activities: preparing for the visit, reviewing tests, obtaining and/or reviewing a separately obtained history, performing a medically appropriate examination and/or evaluation, counseling and educating the patient/family/caregiver and independently interpreting results and communicating that information with the patient/family/caregiver with greater than 50% spent in counseling and coordination of care.       I spent 4 minutes on the separately reported service of US imaging not included in the time used to support the E/M service also reported today.      Beverley Patino MD FACOG  Maternal Fetal Medicine-[facility]  Office: [phone]  [email or web address]

## 2024-06-25 NOTE — PROGRESS NOTES
Pt reports that she is doing well and denies vaginal bleeding, cramping, contractions or LOF at this time. Reports active fetal movement. Reviewed when to call OB office or present to L&D for evaluation with symptoms such as decreased fetal movement, vaginal bleeding, LOF or ctxs. Pt verbalized understanding. Denies HA, visual changes or epigastric pain. Denies any additional complaints at time of appointment. Next OB appointment scheduled for 06/28/2024.    Vitals:    06/25/24 1001   BP: 117/79   Pulse: 98   Temp: 98.4 °F (36.9 °C)

## 2024-06-25 NOTE — LETTER
2024       No Recipients    Patient: Carina Galindo   YOB: 1995   Date of Visit: 2024       Dear Pauline Clements MD    Carina Galindo was in my office today. Below is a copy of my note.    If you have questions, please do not hesitate to call me. I look forward to following Carina along with you.         Sincerely,        Beverley Patino MD    MATERNAL FETAL MEDICINE Consult Note    Dear Dr Pauline Clements MD:    Thank you for your kind referral of Carina Galindo.  As you know, she is a 29 y.o.   at  32 3/7 weeks gestation (Estimated Date of Delivery: 24). This is a consult.      Her antepartum course is complicated by:  SLE  APLAS  Cholestasis  Mild polyhydramnios    Aneuploidy Screening: low risk    HPI: Today, she denies headache, blurry vision, RUQ pain. No vaginal bleeding, no contractions.     Review of History:  Past Medical History:   Diagnosis Date   • Antiphospholipid syndrome    • Anxiety    • exercise induced asthma    • GERD (gastroesophageal reflux disease)    • Iron deficiency anemia    • Knee meniscus pain, left    • Liver cyst     BX AND FOUND TO BE NEGATIVE   • Lupus    • OCD (obsessive compulsive disorder)    • Rheumatoid factor positive      Past Surgical History:   Procedure Laterality Date   • ADENOIDECTOMY     • CHOLECYSTECTOMY WITH INTRAOPERATIVE CHOLANGIOGRAM N/A 2016    Procedure: CHOLECYSTECTOMY LAPAROSCOPIC INTRAOPERATIVE CHOLANGIOGRAM;  Surgeon: Malathi Lozano MD;  Location: [facility];  Service:    • COLONOSCOPY     • ENDOSCOPY  2013    Gastritis    • KNEE ARTHROSCOPY Left 3/28/2023    Procedure: Knee Arthroscopy with lateral release, meniscectomy and cyst excision;  Surgeon: Tapan Craig MD;  Location: [facility] [place];  Service: Orthopedics;  Laterality: Left;   • NASAL SEPTUM SURGERY     • SHOULDER SURGERY Right    • TONSILLECTOMY      x 2 due to regrowth   • UPPER GASTROINTESTINAL ENDOSCOPY     • WISDOM TOOTH  EXTRACTION         Social History     Socioeconomic History   • Marital status:    • Number of children: 0   Tobacco Use   • Smoking status: Never     Passive exposure: Never   • Smokeless tobacco: Never   Vaping Use   • Vaping status: Never Used   Substance and Sexual Activity   • Alcohol use: No   • Drug use: Never   • Sexual activity: Yes     Partners: Male     Birth control/protection: None     Family History   Problem Relation Age of Onset   • Diabetes Father    • Gallbladder disease Mother 30   • Rheum arthritis Mother    • Hypertension Mother    • Endometriosis Mother    • Irritable bowel syndrome Mother    • Pancreatitis Mother    • Breast cancer Maternal Grandmother    • Heart disease Maternal Grandfather    • Breast cancer Maternal Aunt    • Congenital heart disease Cousin    • Malig Hyperthermia Neg Hx    • Deep vein thrombosis Neg Hx    • Pulmonary embolism Neg Hx       Allergies   Allergen Reactions   • Egg-Derived Products Swelling     Eyes swell shut, has some eggs cooked in food.   • Other Other (See Comments)     FOOD ALLERGIES: see list : peanuts and ranch dressing     • Shellfish-Derived Products Anaphylaxis   • Nitrofurantoin Unknown (See Comments) and Rash     Unsure    Unsure   • Peanut Butter Flavor [Flavoring Agent] Hives   • Ciprofloxacin Rash   • Erythromycin Rash     As a baby, no interaction since   • Ibuprofen Rash     Has tolerated low dose in the past   • Multihance [Gadobenate] Hives     Patient experienced one small hive on neck. Was instructed to be pre-medicated per radiologist prior to MRI's for future.    • Sulfa Antibiotics Rash   • Triamcinolone Rash      Current Outpatient Medications on File Prior to Visit   Medication Sig Dispense Refill   • acetaminophen (TYLENOL) 325 MG tablet Take 1 tablet by mouth Every 6 (Six) Hours As Needed.     • albuterol (VENTOLIN HFA) 108 (90 Base) MCG/ACT inhaler Inhale 2 puffs Every 4 (Four) Hours As Needed for Wheezing. 18 g 1   •  Alcohol Swabs 70 % pads Use 1 Swab Daily. 100 each 11   • aspirin (ASPIR) 81 MG EC tablet Take one tablet by mouth daily until 36 weeks gestation. 90 tablet 3   • cholestyramine (Questran) 4 g packet Take 1 packet by mouth Daily. 20 packet 2   • diphenhydrAMINE (BENADRYL) 25 mg capsule Take 1 capsule by mouth Every 6 (Six) Hours As Needed for Itching.     • doxylamine-pyridoxine ER (Bonjesta) 20-20 MG tablet controlled-release tablet Take 1 tablet by mouth 2 (Two) Times a Day. 60 tablet 0   • Enoxaparin Sodium (LOVENOX) 40 MG/0.4ML solution prefilled syringe syringe Inject 0.4 mL under the skin into the appropriate area as directed Daily. 12 mL 11   • EPINEPHrine (EPIPEN) 0.3 MG/0.3ML solution auto-injector injection      • ferrous sulfate 325 (65 FE) MG tablet Take 1 tablet by mouth Daily With Breakfast. 90 tablet 2   • hydroxychloroquine (PLAQUENIL) 200 MG tablet Take 1 tablet by mouth Daily.     • pantoprazole (Protonix) 40 MG EC tablet Take 1 tablet by mouth Daily. 90 tablet 2   • Polysaccharide Iron Complex (ProFe) 391.3 (180 Fe) MG capsule Take  by mouth.     • Prenatal Vit-Fe Fumarate-FA (prenatal vitamin 27-0.8) 27-0.8 MG tablet tablet Take  by mouth Daily.     • sertraline (ZOLOFT) 50 MG tablet      • ursodiol (ACTIGALL) 500 MG tablet Take 1 tablet by mouth 3 (Three) Times a Day. 90 tablet 11     No current facility-administered medications on file prior to visit.        Past obstetric, gynecological, medical, surgical, family and social history reviewed.  Relevant lab work and imaging reviewed.    Review of systems  Constitutional:  denies fever, chills, malaise.   ENT/Mouth:  denies sore throat, tinnitus  Eyes: denies vision changes/pain  CV:  denies chest pain  Respiratory:  denies cough/SOB  GI:  denies N/V, diarrhea, abdominal pain.    :   denies dysuria  Skin:  denies lesions or pruritus   Neuro:  denies weakness, focal neurologic symptoms    Vitals:    06/25/24 1001   BP: 117/79   BP Location:  "Right arm   Patient Position: Sitting   Pulse: 98   Temp: 98.4 °F (36.9 °C)   TempSrc: Temporal   Weight: 103 kg (226 lb)   Height: 162.6 cm (64\")         PHYSICAL EXAM   GENERAL: Not in acute distress, AAOx3, pleasant  CARDIO: regular rate and rhythm  PULM: symmetric chest rise, speaking in complete sentences without difficulty  NEURO: awake, alert and oriented to person, place, and time  ABDOMINAL: No fundal tenderness, no rebound or guarding, gravid  EXTREMITIES: no bilateral lower extremity edema/tenderness  SKIN: Warm, well-perfused    US:  Cephalic presentation.  Anterior placenta.  SHARI 26 cm, consistent with mild stable polyhydramnios.   EFW 2363 g (72%, AC 96%)  BPP 8/8  PRUV visualized.      ASSESSMENT/COUNSELIN y.o.   at  32 3/7 weeks gestation (Estimated Date of Delivery: 24).     -Pregnancy  [ X ] stable  [   ] improving [  ] worsening    Diagnoses and all orders for this visit:    1. Cholestasis of pregnancy in third trimester (Primary)  -     Bile Acids, Total; Standing  -     CBC & Differential; Future  -     Comprehensive Metabolic Panel; Standing  -     Protein / Creatinine Ratio, Urine - Urine, Clean Catch; Future    2. Systemic lupus erythematosus- + anticardio AB, neg SS-A/SS-B    3. Antiphospholipid antibody syndrome complicating pregnancy- cardiolipin AB +    4. Polyhydramnios in third trimester complication, single or unspecified fetus        SLE:  Previously counseled.  Patient doesn't have an official dx of SLE, but began having joint symptoms with swelling right before she got pregnant.  Her mother has a diagnosis of RA.  She has seen rheumatology and met some but not all criteria for Lupus.  However, she is APLAS positive.  Given her symptoms/constellation of findings, she likely has Lupus or a similar autoimmune issue with similar risk factors during pregnancy, so we will treat her like lupus.  She is on plaquenil and doing well on 200 a day.  Her flares involve her " joints and she has never had cardiac, renal, or neuro manifestations.  Her baseline P/C is 116 and BL Cr is 0.61 prepregnancy.  Platelets 431 earlier this year.  She was negative for Ro/La antibodies earlier this year.        I recommend she continue following with her rheumatologist as they recommend and have complement levels and Anti-DS DNA levels each trimester. These will be helpful for comparison if she develops a flare or concern for preeclampsia during this pregnancy.  She will need serial growth exams and twice weekly ANFS starting by 32 weeks between me and her primary OB.      PIH labs appropriate.     Cholestasis:  Previously counseled.    No itching--pt did not tolerate cholestyramine so will stop.  She has no itching.    Her bile acid is rising despite compliance with her ursodiol 500 TID.  Would recommend weekly assessment. She needs a follow up bile acid and CMP on Fridays--ordered.  Will get PIH labs this Friday, as well. RUQ US and GI consult reassuring.       The rate of stillbirth after 37 weeks at baseline is 0.1-0.3% in the [place], but for cholestasis, the incidence is approximately 1-2%.  She has intermittently had severe cholestasis based on bile acids.     In a prospective cohort study evaluating patients affected by ICP with total bile acid levels of 40 mmol/L and higher, it was found that the aOR for stillbirth was 2.58 for adverse fetal outcome when compared with the baseline data of patients without cholestasis.  Would recommend delivery 36 weeks right now and if continues to rise/pt has other symptoms/rising liver enzymes, would recommend delivery after 34 weeks.      The pathophysiology of stillbirth is poorly understood but is likely related to fetal arrythmia or vasospasm of placental vessels.  There is also evidence of increased risk of pre-eclampsia positively correlated with bile acid level.  Women with bile acids >40 are at highest risk with dx of pre-eclampsia often occurring 2-4  weeks after diagnosis of cholestasis.      I recommend 2x per week  fetal surveillance for this diagnosis starting by 32 weeks--we will see her weekly at 28 (definitely 2x per week if bile acids over 40).      Delivery between 34 and 36 weeks of gestation can be considered in women with ICP, with total bile acid levels of100 mmol/L, and with any of the followin) excruciating/ unremitting maternal pruritus not  relieved with ursodiol  2)Hx of stillbirth due to ICP  3)Evidence of worsening hepatic function    Discussed risks fetal problems due to Cholestasis including fetal arrhthmias and even fetal death.  Therefore the recommendation is close (biweekly) fetal monitoring.  We will continue to follow.        APLAS with no personal history of VTE  I discussed with the patient her diagnosis of APLAS, which is based on lupus and her positive lab tests.  We reviewed the risks of pregnancy with APLAS including but not limited to miscarriage, stillbirth, fetal growth restriction, preeclampsia, indicated  birth, and venous thromboembolic event.    We discussed decreasing the risk of these complications with LMWH and baby aspirin throughout pregnancy and for 4-6 weeks post partum.  We also recommend serial growth ultrasound and ANFS twice weekly beyond 32 weeks.    PRUV:  We discussed that the umbilical cord starts off with two umbilical arteries (right and left) and two umbilical veins (right and left). During the 6th week of gestation, the right umbilical vein is supposed to disappear and the left umbilical vein should be the only one remaining. In persistent right umbilical vein, the right vein stays open while the left vein disappears. The incidence of PRUV is 0.17%; of the babies that have PRUV, 13.5% will have other anomalies which also means that 86.5% of babies with PRUV have an isolated (no other birth defects) PRUV. Anomalies that have been associated with PRUV include cardiovascular, nervous,  urinary, skeleton and respiratory anomalies. No other anomalies visualized, but she is increased risk for a heart defect for multiple reasons, so will get fetal ECHO.  She most likely falls into the 86.5% of patients that have isolated PRUV and is a normal variant. We discussed that ultrasound cannot rule out all anomalies.      Polyhydramnios:  Passed 3 hour.  Mild and stable.  If continues next week, will have pt pattern.        Summary of Plan  -Serial growth ultrasounds every 4 weeks (MFM)   -Starting at 32 weeks: twice Weekly fetal  surveillance until delivery--we will do NSTs here on  and BPP's at Dr. Hollins's office on .  We will switch the NST for a growth every 4 weeks.    -Continue ursodiol and repeat bile acids/CMP weekly now that rising.    -Continue ASA and Lovenox--switch to heparin at 34 weeks (heparin 10,000 u BID)  -Continue plaquenil and following with rheum  -Delivery 34-36 weeks for APLAS, anticoagulation, lupus, cholestasis--36 weeks right now but low threshold after 34 if changes occur.    Follow-up: weekly    Thank you for the consult and opportunity to care for this patient.  Please feel free to reach out with any questions or concerns.      I spent 35 minutes caring for this patient on this date of service. This time includes time spent by me in the following activities: preparing for the visit, reviewing tests, obtaining and/or reviewing a separately obtained history, performing a medically appropriate examination and/or evaluation, counseling and educating the patient/family/caregiver and independently interpreting results and communicating that information with the patient/family/caregiver with greater than 50% spent in counseling and coordination of care.       I spent 4 minutes on the separately reported service of US imaging not included in the time used to support the E/M service also reported today.      Beverley Patino MD FACOG  Maternal Fetal  [facility]  Office: [phone]  [email or web address]

## 2024-06-28 ENCOUNTER — TELEPHONE (OUTPATIENT)
Dept: OBSTETRICS AND GYNECOLOGY | Age: 29
End: 2024-06-28
Payer: COMMERCIAL

## 2024-06-28 ENCOUNTER — LAB (OUTPATIENT)
Facility: HOSPITAL | Age: 29
End: 2024-06-28
Payer: COMMERCIAL

## 2024-06-28 ENCOUNTER — ROUTINE PRENATAL (OUTPATIENT)
Dept: OBSTETRICS AND GYNECOLOGY | Age: 29
End: 2024-06-28
Payer: COMMERCIAL

## 2024-06-28 VITALS — SYSTOLIC BLOOD PRESSURE: 110 MMHG | DIASTOLIC BLOOD PRESSURE: 74 MMHG | WEIGHT: 227 LBS | BODY MASS INDEX: 38.96 KG/M2

## 2024-06-28 DIAGNOSIS — O26.643 INTRAHEPATIC CHOLESTASIS OF PREGNANCY IN THIRD TRIMESTER: ICD-10-CM

## 2024-06-28 DIAGNOSIS — O26.643 CHOLESTASIS OF PREGNANCY IN THIRD TRIMESTER: ICD-10-CM

## 2024-06-28 DIAGNOSIS — O99.210 OBESITY IN PREGNANCY, ANTEPARTUM: ICD-10-CM

## 2024-06-28 DIAGNOSIS — F41.9 ANXIETY: ICD-10-CM

## 2024-06-28 DIAGNOSIS — M32.9 SYSTEMIC LUPUS ERYTHEMATOSUS, MATERNAL, ANTEPARTUM: ICD-10-CM

## 2024-06-28 DIAGNOSIS — Z34.03 ENCOUNTER FOR PRENATAL CARE IN THIRD TRIMESTER OF FIRST PREGNANCY: Primary | ICD-10-CM

## 2024-06-28 DIAGNOSIS — O99.119 ANTIPHOSPHOLIPID ANTIBODY SYNDROME COMPLICATING PREGNANCY: ICD-10-CM

## 2024-06-28 DIAGNOSIS — Z13.89 SCREENING FOR BLOOD OR PROTEIN IN URINE: ICD-10-CM

## 2024-06-28 DIAGNOSIS — O99.810 ABNORMAL GLUCOSE TOLERANCE TEST (GTT) DURING PREGNANCY, ANTEPARTUM: ICD-10-CM

## 2024-06-28 DIAGNOSIS — D68.61 ANTIPHOSPHOLIPID ANTIBODY SYNDROME COMPLICATING PREGNANCY: ICD-10-CM

## 2024-06-28 DIAGNOSIS — O99.019 MATERNAL ANEMIA IN PREGNANCY, ANTEPARTUM: ICD-10-CM

## 2024-06-28 DIAGNOSIS — O99.891 SYSTEMIC LUPUS ERYTHEMATOSUS, MATERNAL, ANTEPARTUM: ICD-10-CM

## 2024-06-28 PROBLEM — O40.3XX0 POLYHYDRAMNIOS IN THIRD TRIMESTER: Status: RESOLVED | Noted: 2024-06-18 | Resolved: 2024-06-28

## 2024-06-28 LAB
ALBUMIN SERPL-MCNC: 3.2 G/DL (ref 3.5–5.2)
ALBUMIN/GLOB SERPL: 1 G/DL
ALP SERPL-CCNC: 188 U/L (ref 39–117)
ALT SERPL W P-5'-P-CCNC: 23 U/L (ref 1–33)
ANION GAP SERPL CALCULATED.3IONS-SCNC: 10.9 MMOL/L (ref 5–15)
AST SERPL-CCNC: 17 U/L (ref 1–32)
BASOPHILS # BLD AUTO: 0.05 10*3/MM3 (ref 0–0.2)
BASOPHILS NFR BLD AUTO: 0.4 % (ref 0–1.5)
BILE AC SERPL-SCNC: 20 UMOL/L (ref 0–10)
BILIRUB BLD-MCNC: NEGATIVE MG/DL
BILIRUB SERPL-MCNC: 0.3 MG/DL (ref 0–1.2)
BUN SERPL-MCNC: 3 MG/DL (ref 6–20)
BUN/CREAT SERPL: 5.5 (ref 7–25)
CALCIUM SPEC-SCNC: 8.5 MG/DL (ref 8.6–10.5)
CHLORIDE SERPL-SCNC: 106 MMOL/L (ref 98–107)
CO2 SERPL-SCNC: 21.1 MMOL/L (ref 22–29)
CREAT SERPL-MCNC: 0.55 MG/DL (ref 0.57–1)
DEPRECATED RDW RBC AUTO: 36.9 FL (ref 37–54)
EGFRCR SERPLBLD CKD-EPI 2021: 125.9 ML/MIN/1.73
EOSINOPHIL # BLD AUTO: 0.18 10*3/MM3 (ref 0–0.4)
EOSINOPHIL NFR BLD AUTO: 1.3 % (ref 0.3–6.2)
ERYTHROCYTE [DISTWIDTH] IN BLOOD BY AUTOMATED COUNT: 11.8 % (ref 12.3–15.4)
GLOBULIN UR ELPH-MCNC: 3.3 GM/DL
GLUCOSE SERPL-MCNC: 84 MG/DL (ref 65–99)
GLUCOSE UR STRIP-MCNC: NEGATIVE MG/DL
HCT VFR BLD AUTO: 33.9 % (ref 34–46.6)
HGB BLD-MCNC: 11.5 G/DL (ref 12–15.9)
IMM GRANULOCYTES # BLD AUTO: 0.36 10*3/MM3 (ref 0–0.05)
IMM GRANULOCYTES NFR BLD AUTO: 2.6 % (ref 0–0.5)
KETONES UR QL: NEGATIVE
LEUKOCYTE EST, POC: ABNORMAL
LYMPHOCYTES # BLD AUTO: 2 10*3/MM3 (ref 0.7–3.1)
LYMPHOCYTES NFR BLD AUTO: 14.4 % (ref 19.6–45.3)
MCH RBC QN AUTO: 29.4 PG (ref 26.6–33)
MCHC RBC AUTO-ENTMCNC: 33.9 G/DL (ref 31.5–35.7)
MCV RBC AUTO: 86.7 FL (ref 79–97)
MONOCYTES # BLD AUTO: 0.99 10*3/MM3 (ref 0.1–0.9)
MONOCYTES NFR BLD AUTO: 7.1 % (ref 5–12)
NEUTROPHILS NFR BLD AUTO: 10.34 10*3/MM3 (ref 1.7–7)
NEUTROPHILS NFR BLD AUTO: 74.2 % (ref 42.7–76)
NITRITE UR-MCNC: NEGATIVE MG/ML
NRBC BLD AUTO-RTO: 0 /100 WBC (ref 0–0.2)
PH UR: 7 [PH] (ref 5–8)
PLATELET # BLD AUTO: 323 10*3/MM3 (ref 140–450)
PMV BLD AUTO: 9.8 FL (ref 6–12)
POTASSIUM SERPL-SCNC: 3.4 MMOL/L (ref 3.5–5.2)
PROT SERPL-MCNC: 6.5 G/DL (ref 6–8.5)
PROT UR STRIP-MCNC: NEGATIVE MG/DL
RBC # BLD AUTO: 3.91 10*6/MM3 (ref 3.77–5.28)
RBC # UR STRIP: NEGATIVE /UL
SODIUM SERPL-SCNC: 138 MMOL/L (ref 136–145)
SP GR UR: 1.01 (ref 1–1.03)
UROBILINOGEN UR QL: NORMAL
WBC NRBC COR # BLD AUTO: 13.92 10*3/MM3 (ref 3.4–10.8)

## 2024-06-28 PROCEDURE — 36415 COLL VENOUS BLD VENIPUNCTURE: CPT

## 2024-06-28 PROCEDURE — 80053 COMPREHEN METABOLIC PANEL: CPT

## 2024-06-28 PROCEDURE — 85025 COMPLETE CBC W/AUTO DIFF WBC: CPT

## 2024-06-28 PROCEDURE — 0502F SUBSEQUENT PRENATAL CARE: CPT | Performed by: OBSTETRICS & GYNECOLOGY

## 2024-06-28 PROCEDURE — 82239 BILE ACIDS TOTAL: CPT

## 2024-06-28 NOTE — PROGRESS NOTES
Chief Complaint   Patient presents with    Routine Prenatal Visit     Cc: ob check with BPP Us , still having RUQ pain , reports good FM , patient supposed to switch from Lovenox to heparin at 33 wks which is tomorrow        HPI: 29 y.o.  at 32w6d presents for prenatal care    Last OB US growth (since 2024)       None          Vitals:    24 1130   BP: 110/74   Weight: 103 kg (227 lb)     Total weight gain for pregnancy:  5.897 kg (13 lb)    Review of systems:   Gen: negative  CV:     negative  GI: negative  :   negative and good fetal movement noted   MS:    negative  Neuro: negative  Pul: negative    Physical Exam  Vitals and nursing note reviewed.   Constitutional:       Appearance: Normal appearance. She is obese.   Pulmonary:      Effort: Pulmonary effort is normal.   Neurological:      Mental Status: She is alert.   Psychiatric:         Mood and Affect: Mood normal.         Thought Content: Thought content normal.         Judgment: Judgment normal.           A/P  1. Intrauterine pregnancy at 32w6d   2. Pregnancy Risk:  HIGH RISK    Diagnoses and all orders for this visit:    1. Encounter for prenatal care in third trimester of first pregnancy (Primary)    2. Screening for blood or protein in urine  -     POC Urinalysis Dipstick    3. Antiphospholipid antibody syndrome complicating pregnancy- cardiolipin AB +    4. Abnormal glucose tolerance test (GTT) during pregnancy, antepartum  Overview:  3-hour glucose test within normal limits      5. Intrahepatic cholestasis of pregnancy in third trimester  -     Bile Acids, Total; Future  -     Comprehensive Metabolic Panel; Future    6. Obesity in pregnancy, antepartum    7. Anxiety    8. Maternal anemia in pregnancy, antepartum    9. Systemic lupus erythematosus- + anticardio AB, neg SS-A/SS-B        Pre-eclampsia symptoms were discussed and warnings were given.   labor was discussed.  Warnings were provided.  Nutrition and weight gain were  addressed.  -----------------------  PLAN:   Return in about 1 week (around 7/5/2024), or ob check and BPP.  Will change to heparin 10,000 twice daily  Check bile acids and CMP today  Follow-up with [facility] on Tuesday    Tammy Hollins MD  6/28/2024 12:00 EDT

## 2024-07-02 ENCOUNTER — HOSPITAL ENCOUNTER (OUTPATIENT)
Dept: ULTRASOUND IMAGING | Facility: HOSPITAL | Age: 29
Discharge: HOME | End: 2024-07-02
Admitting: OBSTETRICS & GYNECOLOGY
Payer: COMMERCIAL

## 2024-07-02 ENCOUNTER — OFFICE VISIT (OUTPATIENT)
Dept: OBSTETRICS AND GYNECOLOGY | Facility: CLINIC | Age: 29
End: 2024-07-02
Payer: COMMERCIAL

## 2024-07-02 DIAGNOSIS — O99.891 SYSTEMIC LUPUS ERYTHEMATOSUS, MATERNAL, ANTEPARTUM: ICD-10-CM

## 2024-07-02 DIAGNOSIS — D68.61 ANTIPHOSPHOLIPID ANTIBODY SYNDROME COMPLICATING PREGNANCY: ICD-10-CM

## 2024-07-02 DIAGNOSIS — D68.61 ANTIPHOSPHOLIPID ANTIBODY SYNDROME COMPLICATING PREGNANCY: Primary | ICD-10-CM

## 2024-07-02 DIAGNOSIS — O26.643 CHOLESTASIS OF PREGNANCY IN THIRD TRIMESTER: Primary | ICD-10-CM

## 2024-07-02 DIAGNOSIS — M32.9 SYSTEMIC LUPUS ERYTHEMATOSUS, MATERNAL, ANTEPARTUM: ICD-10-CM

## 2024-07-02 DIAGNOSIS — O99.119 ANTIPHOSPHOLIPID ANTIBODY SYNDROME COMPLICATING PREGNANCY: ICD-10-CM

## 2024-07-02 DIAGNOSIS — O99.119 ANTIPHOSPHOLIPID ANTIBODY SYNDROME COMPLICATING PREGNANCY: Primary | ICD-10-CM

## 2024-07-02 PROCEDURE — 76825 ECHO EXAM OF FETAL HEART: CPT

## 2024-07-02 PROCEDURE — 93325 DOPPLER ECHO COLOR FLOW MAPG: CPT

## 2024-07-02 PROCEDURE — 76827 ECHO EXAM OF FETAL HEART: CPT

## 2024-07-02 RX ORDER — HEPARIN SODIUM 10000 [USP'U]/ML
INJECTION, SOLUTION INTRAVENOUS; SUBCUTANEOUS
Qty: 60 ML | Refills: 2 | Status: SHIPPED | OUTPATIENT
Start: 2024-07-02

## 2024-07-02 NOTE — PROGRESS NOTES
Fetal Cardiology Outpatient Consult  Note    Patient Name:Carina Galindo  :1995  Medical Record Number:[id number]  Date of Service:2024  Requesting Obstetrician: Dr. Beverley Patino, [facility]  Primary Obstetrician: Dr. Pauline Clements  Consult Location: [facility]    Reason for Visit:   Persistent Right Umbilical Vein    History: I had the pleasure of seeing your patient, Carina Galindo, today for a Fetal Cardiology Initial Consultation.  Fetal cardiology evaluation was requested due to  Persistent Right Umbilical Vein .      Carina is a 29 y.o. woman who presents today at 33 3/7 weeks gestation, with a given due date of 2024.  This pregnancy was conceived naturally. Pregnancy has been complicated by SLE, APLAS, and cholestasis.        OB History          1    Para   0    Term   0       0    AB   0    Living   0         SAB   0    IAB   0    Ectopic   0    Molar   0    Multiple   0    Live Births   0          Obstetric Comments   3/29/24 - 19-6 weeks - normal, completed anatomy, normal cl - ant placenta -[facility]                 Past Medical History:  Past Medical History:   Diagnosis Date    Antiphospholipid syndrome     Anxiety     exercise induced asthma     GERD (gastroesophageal reflux disease)     Iron deficiency anemia     Knee meniscus pain, left     Liver cyst     BX AND FOUND TO BE NEGATIVE    Lupus     OCD (obsessive compulsive disorder)     Rheumatoid factor positive        Current Medications:    Current Outpatient Medications:     acetaminophen (TYLENOL) 325 MG tablet, Take 1 tablet by mouth Every 6 (Six) Hours As Needed., Disp: , Rfl:     albuterol (VENTOLIN HFA) 108 (90 Base) MCG/ACT inhaler, Inhale 2 puffs Every 4 (Four) Hours As Needed for Wheezing., Disp: 18 g, Rfl: 1    Alcohol Swabs 70 % pads, Use 1 Swab Daily., Disp: 100 each, Rfl: 11    aspirin (ASPIR) 81 MG EC tablet, Take one tablet by mouth daily until 36 weeks  "gestation., Disp: 90 tablet, Rfl: 3    cholestyramine (Questran) 4 g packet, Take 1 packet by mouth Daily., Disp: 20 packet, Rfl: 2    diphenhydrAMINE (BENADRYL) 25 mg capsule, Take 1 capsule by mouth Every 6 (Six) Hours As Needed for Itching., Disp: , Rfl:     doxylamine-pyridoxine ER (Bonjesta) 20-20 MG tablet controlled-release tablet, Take 1 tablet by mouth 2 (Two) Times a Day., Disp: 60 tablet, Rfl: 0    Enoxaparin Sodium (LOVENOX) 40 MG/0.4ML solution prefilled syringe syringe, Inject 0.4 mL under the skin into the appropriate area as directed Daily., Disp: 12 mL, Rfl: 11    EPINEPHrine (EPIPEN) 0.3 MG/0.3ML solution auto-injector injection, , Disp: , Rfl:     ferrous sulfate 325 (65 FE) MG tablet, Take 1 tablet by mouth Daily With Breakfast., Disp: 90 tablet, Rfl: 2    Heparin Sodium, Porcine, (heparin, porcine,) 5000 UNIT/ML injection, Inject 2 mL under the skin into the appropriate area as directed 2 (Two) Times a Day for 42 days., Disp: 170 mL, Rfl: 1    hydroxychloroquine (PLAQUENIL) 200 MG tablet, Take 1 tablet by mouth Daily., Disp: , Rfl:     pantoprazole (Protonix) 40 MG EC tablet, Take 1 tablet by mouth Daily., Disp: 90 tablet, Rfl: 2    Polysaccharide Iron Complex (ProFe) 391.3 (180 Fe) MG capsule, Take  by mouth., Disp: , Rfl:     Prenatal Vit-Fe Fumarate-FA (prenatal vitamin 27-0.8) 27-0.8 MG tablet tablet, Take  by mouth Daily., Disp: , Rfl:     sertraline (ZOLOFT) 50 MG tablet, , Disp: , Rfl:     Syringe/Needle, Disp, (BD Eclipse Syringe/Needle) 25G X 5/8\" 3 ML misc, Use to inject heparin, Disp: 84 each, Rfl: 0    ursodiol (ACTIGALL) 500 MG tablet, Take 1 tablet by mouth 3 (Three) Times a Day., Disp: 90 tablet, Rfl: 11    Family History:  Family History   Problem Relation Age of Onset    Diabetes Father     Gallbladder disease Mother 30    Rheum arthritis Mother     Hypertension Mother     Endometriosis Mother     Irritable bowel syndrome Mother     Pancreatitis Mother     Breast cancer Maternal " Grandmother     Heart disease Maternal Grandfather     Breast cancer Maternal Aunt     Congenital heart disease Cousin     Elissa Hyperthermia Neg Hx     Deep vein thrombosis Neg Hx     Pulmonary embolism Neg Hx      There is no known family history of congenital heart disease or genetic abnormalities or early childhood death.    Imaging: A complete 2-D, color, and spectral doppler fetal echocardiogram was performed.  A full report is available separately.  In summary, today's findings show the following:     - Normal fetal cardiac anatomy and function at 33 weeks gestation.  - Persistent right umbilical vein, which is intrahepatic and enters a normal ductus venosus with normal Doppler pattern - overall appears to be a normal variant.    A complete, detailed fetal echocardiogram can identify most major heart defects.  However, there are known limitations to this examination including a limited ability to diagnose some small atrial or ventricular septal defects, minor valve abnormalities, persistent patency of the ductus arteriosus, coarctation of the aorta, and abnormalities in small structures such as coronary arteries and pulmonary veins.    Discussion:   Findings were explained to patient and and her family.  The echo display screens in our examination room were used.  Questions were answered at length and patient expressed understanding.  I explained the normal fetal circulation, today's fetal echocardiogram findings, the expected course of pregnancy, the impact of today's results on the location and mode of delivery and the expected  course.     Impression: In summary, today's evaluation found the followin) Normal fetal cardiac anatomy and function, with a normal variant intrahepatic persistent right umbilical vein with normal ductus venosus.  2) 33 weeks gestation    Recommendations:   1. There is no evidence of a ductal dependent fetal cardiac lesion.  I do not anticipate the need for immediate  initiation of prostaglandin therapy and pediatric cardiology evaluation after birth.  2. There is no fetal cardiac indication to delivery at a hospital which provides specialized pediatric cardiac care.  Carina is currently planning to delivery at [facility], which is appropriate from the standpoint of the fetal heart and circulation.  3. There is no increased fetal cardiac risk from a trial of labor and vaginal or cesearian delivery based on today's fetal cardiac findings.  4. Based on the results of today's examination, no further fetal echocardiograms are recommended during pregnancy.  5.  Recommend routine  and well  after birth by a primary care provider, further evaluation by pediatric cardiology is recommended only as needed.  6. I would be happy to see Carina again during pregnancy for any changes, problems, or concerns that may arise.  Please do not hesitate to call with any questions you may have.    Thank you for allowing me to share with you in the care of your patient.    I personally spent 60 minutes of direct provider time for the visit today, including review of prior OB and MFM medical records, face-to-face discussion and review of fetal cardiac images in the examination room, and charting.     Inocencio Madden MD  [facility]  Pediatric Cardiology  [phone]    2024  08:32 EDT

## 2024-07-02 NOTE — PROGRESS NOTES
Pt here today for fetal echo with Dr Madden but has questions about medications.     BPP obtained at time of ECHO    BPP 8/8   SHARI 21 cm, which is normal.    Pt with questions about heparin 10,000 units BID    She wanted to make sure the dose was correct as the heparin is more volume to inject when compared to lovenox.   Explained that heparin prescribed was  5,000 units / ML and she needs 10,000 units total which is 2 mL to inject     Sent patient a patient message to let her know that heparin is also available as 10,000 units / mL so I sent a  new prescription to the pharmacy for her.   This volume may be easier to inject subcutaneously than the 2 mL prescription.    Pt reports some RUQ pain that comes and goes. Not consistent/constant and not severe.   Has had RUQ ultrasound that was WNL.  Strict precautions for when to present to [facility].   Pt states understanding.    Bile acids most recently were 20, which is down from 35.       Polyhydramnios resolved and patient aware.  Continue twice weekly ANFS - appts are scheduled.    Aware to call or go to [facility] with any concerns.   Keep next scheduled appointment.    DIAGNOSES  1. Cholestasis of pregnancy in third trimester (Primary)     2. Systemic lupus erythematosus- + anticardio AB, neg SS-A/SS-B     3. Antiphospholipid antibody syndrome complicating pregnancy- cardiolipin AB +         Summary of Plan  -Serial growth ultrasounds every 4 weeks (MFM)   -Twice Weekly fetal  surveillance until delivery--we will do NSTs here on  and BPP's at Dr. Hollins's office on .  We will switch the NST for a growth every 4 weeks.    -Continue ursodiol and repeat bile acids/CMP weekly   -Continue ASA and Lovenox--switch to heparin at 34 weeks (heparin 10,000 u BID)  -Continue plaquenil and following with rheum  -Delivery 34-36 weeks for APLAS, anticoagulation, lupus, cholestasis--36 weeks right now but low threshold after 34 if changes occur.     Follow-up:  weekly    I spent 30 minutes caring for this patient on this date of service. This time includes time spent by me in the following activities: preparing for the visit, reviewing tests, obtaining and/or reviewing a separately obtained history, performing a medically appropriate examination and/or evaluation, counseling and educating the patient/family/caregiver and independently interpreting results and communicating that information with the patient/family/caregiver with greater than 50% spent in counseling and coordination of care.     ZACH Parsons  Maternal Fetal Medicine-[facility]  Office: [phone]  [email or web address]

## 2024-07-05 ENCOUNTER — ROUTINE PRENATAL (OUTPATIENT)
Dept: OBSTETRICS AND GYNECOLOGY | Age: 29
End: 2024-07-05
Payer: COMMERCIAL

## 2024-07-05 ENCOUNTER — LAB (OUTPATIENT)
Facility: HOSPITAL | Age: 29
End: 2024-07-05
Payer: COMMERCIAL

## 2024-07-05 VITALS — BODY MASS INDEX: 39.31 KG/M2 | SYSTOLIC BLOOD PRESSURE: 114 MMHG | DIASTOLIC BLOOD PRESSURE: 76 MMHG | WEIGHT: 229 LBS

## 2024-07-05 DIAGNOSIS — O99.891 SYSTEMIC LUPUS ERYTHEMATOSUS, MATERNAL, ANTEPARTUM: ICD-10-CM

## 2024-07-05 DIAGNOSIS — Z13.89 SCREENING FOR BLOOD OR PROTEIN IN URINE: ICD-10-CM

## 2024-07-05 DIAGNOSIS — O99.210 OBESITY IN PREGNANCY, ANTEPARTUM: ICD-10-CM

## 2024-07-05 DIAGNOSIS — O99.810 ABNORMAL GLUCOSE TOLERANCE TEST (GTT) DURING PREGNANCY, ANTEPARTUM: ICD-10-CM

## 2024-07-05 DIAGNOSIS — O26.643 INTRAHEPATIC CHOLESTASIS OF PREGNANCY IN THIRD TRIMESTER: ICD-10-CM

## 2024-07-05 DIAGNOSIS — M32.9 SYSTEMIC LUPUS ERYTHEMATOSUS, MATERNAL, ANTEPARTUM: ICD-10-CM

## 2024-07-05 DIAGNOSIS — O99.119 ANTIPHOSPHOLIPID ANTIBODY SYNDROME COMPLICATING PREGNANCY: ICD-10-CM

## 2024-07-05 DIAGNOSIS — Z3A.33 33 WEEKS GESTATION OF PREGNANCY: Primary | ICD-10-CM

## 2024-07-05 DIAGNOSIS — D68.61 ANTIPHOSPHOLIPID ANTIBODY SYNDROME COMPLICATING PREGNANCY: ICD-10-CM

## 2024-07-05 DIAGNOSIS — O99.019 MATERNAL ANEMIA IN PREGNANCY, ANTEPARTUM: ICD-10-CM

## 2024-07-05 LAB
ALBUMIN SERPL-MCNC: 3.2 G/DL (ref 3.5–5.2)
ALBUMIN/GLOB SERPL: 0.9 G/DL
ALP SERPL-CCNC: 206 U/L (ref 39–117)
ALT SERPL W P-5'-P-CCNC: 22 U/L (ref 1–33)
ANION GAP SERPL CALCULATED.3IONS-SCNC: 8 MMOL/L (ref 5–15)
AST SERPL-CCNC: 22 U/L (ref 1–32)
BILE AC SERPL-SCNC: 41 UMOL/L (ref 0–10)
BILIRUB SERPL-MCNC: 0.3 MG/DL (ref 0–1.2)
BUN SERPL-MCNC: 4 MG/DL (ref 6–20)
BUN/CREAT SERPL: 7.8 (ref 7–25)
CALCIUM SPEC-SCNC: 9 MG/DL (ref 8.6–10.5)
CHLORIDE SERPL-SCNC: 107 MMOL/L (ref 98–107)
CO2 SERPL-SCNC: 22 MMOL/L (ref 22–29)
CREAT SERPL-MCNC: 0.51 MG/DL (ref 0.57–1)
EGFRCR SERPLBLD CKD-EPI 2021: 128.2 ML/MIN/1.73
GLOBULIN UR ELPH-MCNC: 3.4 GM/DL
GLUCOSE SERPL-MCNC: 82 MG/DL (ref 65–99)
GLUCOSE UR STRIP-MCNC: NEGATIVE MG/DL
POTASSIUM SERPL-SCNC: 3.5 MMOL/L (ref 3.5–5.2)
PROT SERPL-MCNC: 6.6 G/DL (ref 6–8.5)
PROT UR STRIP-MCNC: NEGATIVE MG/DL
SODIUM SERPL-SCNC: 137 MMOL/L (ref 136–145)
WHOLE BLOOD HOLD SPECIMEN: NORMAL

## 2024-07-05 PROCEDURE — 36415 COLL VENOUS BLD VENIPUNCTURE: CPT

## 2024-07-05 PROCEDURE — 80053 COMPREHEN METABOLIC PANEL: CPT

## 2024-07-05 PROCEDURE — 82239 BILE ACIDS TOTAL: CPT

## 2024-07-05 NOTE — PROGRESS NOTES
Chief Complaint   Patient presents with    Routine Prenatal Visit     33w6d OB Check with BPP, pt c/o right upper quadrant pain.        HPI: 29 y.o.  at 33w6d presents for OB follow-up.  Patient of Dr. Hollins  She is doing well today.   RUQ pain worsening, occurring about 5 times a day. Endorses good fetal movement. No bleeding or loss of fluids.  On heparin now.  Insurance sent note about hospital stay  not being medically necessary.  BPP today , SHARI 19.27 cm.    Relevant data reviewed:  US fetal biophysical profile wo non stress testing (2024 08:33)     Last OB US growth (since 2024)       None          Vitals:    24 0848   BP: 114/76   Weight: 104 kg (229 lb)     Total weight gain for pregnancy:  6.804 kg (15 lb)    Review of systems:   Gen: negative  CV:     negative  GI: negative  :   negative and good fetal movement noted   MS:    negative  Neuro: negative  Pul: negative    Physical Exam  Constitutional:       General: She is not in acute distress.  Cardiovascular:      Rate and Rhythm: Normal rate and regular rhythm.   Pulmonary:      Effort: Pulmonary effort is normal.      Breath sounds: Normal breath sounds.   Abdominal:      Palpations: Abdomen is soft.      Tenderness: There is no abdominal tenderness.   Skin:     General: Skin is warm.   Neurological:      Mental Status: She is alert and oriented to person, place, and time.   Psychiatric:         Mood and Affect: Mood normal.         Behavior: Behavior normal.         Thought Content: Thought content normal.         Judgment: Judgment normal.         A/P  1. Intrauterine pregnancy at 33w6d   2. Pregnancy Risk:  HIGH RISK    Diagnoses and all orders for this visit:    1. 33 weeks gestation of pregnancy (Primary)    2. Screening for blood or protein in urine  -     POC Urinalysis Dipstick    3. Antiphospholipid antibody syndrome complicating pregnancy- cardiolipin AB +    4. Systemic lupus erythematosus- + anticardio AB, neg  SS-A/SS-B    5. Intrahepatic cholestasis of pregnancy in third trimester  -     Comprehensive Metabolic Panel  -     Cancel: Bile Acids, Total    6. Maternal anemia in pregnancy, antepartum    7. Abnormal glucose tolerance test (GTT) during pregnancy, antepartum  Overview:  3-hour glucose test within normal limits      8. Obesity in pregnancy, antepartum        Pre-eclampsia symptoms were discussed and warnings were given.   labor was discussed.  Warnings were provided.  Nutrition and weight gain were addressed.  -----------------------  PLAN:   Reviewed US results with pt - BPP today , SHARI 19.27 cm.  Discussed RUQ with Dr. Hollins on the phone. Advised pt to continue monitoring and go to L&D if pain increases.   CMP and Bile Acids labs completed today - will call pt with results.  Return for Next scheduled follow up. - MFM on , OB f/u and BPP next  with Dr. Hollins.    Karin Dee PA-C  2024 13:19 EDT

## 2024-07-09 ENCOUNTER — OFFICE VISIT (OUTPATIENT)
Dept: OBSTETRICS AND GYNECOLOGY | Facility: CLINIC | Age: 29
End: 2024-07-09
Payer: COMMERCIAL

## 2024-07-09 ENCOUNTER — TELEPHONE (OUTPATIENT)
Dept: OBSTETRICS AND GYNECOLOGY | Facility: CLINIC | Age: 29
End: 2024-07-09

## 2024-07-09 ENCOUNTER — HOSPITAL ENCOUNTER (OUTPATIENT)
Dept: ULTRASOUND IMAGING | Facility: HOSPITAL | Age: 29
Discharge: HOME | End: 2024-07-09
Payer: COMMERCIAL

## 2024-07-09 ENCOUNTER — LAB (OUTPATIENT)
Dept: LAB | Facility: HOSPITAL | Age: 29
End: 2024-07-09
Payer: COMMERCIAL

## 2024-07-09 VITALS
HEART RATE: 92 BPM | WEIGHT: 231 LBS | OXYGEN SATURATION: 99 % | TEMPERATURE: 99.1 F | BODY MASS INDEX: 39.44 KG/M2 | HEIGHT: 64 IN | DIASTOLIC BLOOD PRESSURE: 86 MMHG | SYSTOLIC BLOOD PRESSURE: 136 MMHG

## 2024-07-09 DIAGNOSIS — O26.643 CHOLESTASIS OF PREGNANCY IN THIRD TRIMESTER: ICD-10-CM

## 2024-07-09 DIAGNOSIS — O99.119 ANTIPHOSPHOLIPID ANTIBODY SYNDROME COMPLICATING PREGNANCY: ICD-10-CM

## 2024-07-09 DIAGNOSIS — M32.9 SYSTEMIC LUPUS ERYTHEMATOSUS, MATERNAL, ANTEPARTUM: ICD-10-CM

## 2024-07-09 DIAGNOSIS — O99.891 SYSTEMIC LUPUS ERYTHEMATOSUS, MATERNAL, ANTEPARTUM: ICD-10-CM

## 2024-07-09 DIAGNOSIS — D68.61 ANTIPHOSPHOLIPID ANTIBODY SYNDROME COMPLICATING PREGNANCY: ICD-10-CM

## 2024-07-09 DIAGNOSIS — O40.9XX0 POLYHYDRAMNIOS AFFECTING PREGNANCY: ICD-10-CM

## 2024-07-09 DIAGNOSIS — O26.643 CHOLESTASIS OF PREGNANCY IN THIRD TRIMESTER: Primary | ICD-10-CM

## 2024-07-09 LAB
ALBUMIN SERPL-MCNC: 3.3 G/DL (ref 3.5–5.2)
ALBUMIN/GLOB SERPL: 1.1 G/DL
ALP SERPL-CCNC: 215 U/L (ref 39–117)
ALT SERPL W P-5'-P-CCNC: 25 U/L (ref 1–33)
ANION GAP SERPL CALCULATED.3IONS-SCNC: 8 MMOL/L (ref 5–15)
AST SERPL-CCNC: 24 U/L (ref 1–32)
BACTERIA UR QL AUTO: ABNORMAL /HPF
BILE AC SERPL-SCNC: 38 UMOL/L (ref 0–10)
BILIRUB SERPL-MCNC: 0.3 MG/DL (ref 0–1.2)
BILIRUB UR QL STRIP: NEGATIVE
BUN SERPL-MCNC: 4 MG/DL (ref 6–20)
BUN/CREAT SERPL: 7.7 (ref 7–25)
CALCIUM SPEC-SCNC: 8.9 MG/DL (ref 8.6–10.5)
CHLORIDE SERPL-SCNC: 106 MMOL/L (ref 98–107)
CLARITY UR: CLEAR
CO2 SERPL-SCNC: 23 MMOL/L (ref 22–29)
COLOR UR: YELLOW
CREAT SERPL-MCNC: 0.52 MG/DL (ref 0.57–1)
EGFRCR SERPLBLD CKD-EPI 2021: 127.6 ML/MIN/1.73
GLOBULIN UR ELPH-MCNC: 3.1 GM/DL
GLUCOSE SERPL-MCNC: 70 MG/DL (ref 65–99)
GLUCOSE UR STRIP-MCNC: NEGATIVE MG/DL
HGB UR QL STRIP.AUTO: NEGATIVE
HYALINE CASTS UR QL AUTO: ABNORMAL /LPF
KETONES UR QL STRIP: NEGATIVE
LEUKOCYTE ESTERASE UR QL STRIP.AUTO: ABNORMAL
NITRITE UR QL STRIP: NEGATIVE
PH UR STRIP.AUTO: 7 [PH] (ref 5–8)
POTASSIUM SERPL-SCNC: 3.3 MMOL/L (ref 3.5–5.2)
PROT SERPL-MCNC: 6.4 G/DL (ref 6–8.5)
PROT UR QL STRIP: NEGATIVE
RBC # UR STRIP: ABNORMAL /HPF
REF LAB TEST METHOD: ABNORMAL
SODIUM SERPL-SCNC: 137 MMOL/L (ref 136–145)
SP GR UR STRIP: 1.01 (ref 1–1.03)
SQUAMOUS #/AREA URNS HPF: ABNORMAL /HPF
UROBILINOGEN UR QL STRIP: ABNORMAL
WBC # UR STRIP: ABNORMAL /HPF

## 2024-07-09 PROCEDURE — 87086 URINE CULTURE/COLONY COUNT: CPT

## 2024-07-09 PROCEDURE — 80053 COMPREHEN METABOLIC PANEL: CPT

## 2024-07-09 PROCEDURE — 82239 BILE ACIDS TOTAL: CPT | Performed by: OBSTETRICS & GYNECOLOGY

## 2024-07-09 PROCEDURE — 36415 COLL VENOUS BLD VENIPUNCTURE: CPT | Performed by: OBSTETRICS & GYNECOLOGY

## 2024-07-09 PROCEDURE — 81001 URINALYSIS AUTO W/SCOPE: CPT

## 2024-07-09 PROCEDURE — 76819 FETAL BIOPHYS PROFIL W/O NST: CPT

## 2024-07-09 NOTE — TELEPHONE ENCOUNTER
Call placed to Carina to review normal liver enzymes and stable bile acids. Pt notified Dr. Patino spoke to Dr. Hollins and will plan on rechecking labs on Friday appointment. Notified Carina that urine does not show signs of infection but will culture just in case- will notify pt once we have results.     Pt encouraged to go to L&D with any decrease in movement, labor concern, or additional symptoms that would cause worry. Pt voices understanding and has no additional questions at this time.

## 2024-07-09 NOTE — PROGRESS NOTES
MATERNAL FETAL MEDICINE Consult Note    Dear Dr Tammy Hollins MD:    Thank you for your kind referral of Carina Galindo.  As you know, she is a 29 y.o.   at  34 3/7 weeks gestation (Estimated Date of Delivery: 24). This is a consult.      Her antepartum course is complicated by:  SLE  APLAS  Cholestasis  Mild polyhydramnios    Aneuploidy Screening: low risk    HPI: Today, she denies headache, blurry vision, RUQ pain. No vaginal bleeding, no contractions.     Review of History:  Past Medical History:   Diagnosis Date    Antiphospholipid syndrome     Anxiety     exercise induced asthma     GERD (gastroesophageal reflux disease)     Iron deficiency anemia     Knee meniscus pain, left     Liver cyst     BX AND FOUND TO BE NEGATIVE    Lupus     OCD (obsessive compulsive disorder)     Rheumatoid factor positive      Past Surgical History:   Procedure Laterality Date    ADENOIDECTOMY      CHOLECYSTECTOMY WITH INTRAOPERATIVE CHOLANGIOGRAM N/A 2016    Procedure: CHOLECYSTECTOMY LAPAROSCOPIC INTRAOPERATIVE CHOLANGIOGRAM;  Surgeon: Malathi Lozano MD;  Location: [facility] OR [facility];  Service:     COLONOSCOPY  2010    ENDOSCOPY  2013    Gastritis     KNEE ARTHROSCOPY Left 3/28/2023    Procedure: Knee Arthroscopy with lateral release, meniscectomy and cyst excision;  Surgeon: Tapan Craig MD;  Location: [facility];  Service: Orthopedics;  Laterality: Left;    NASAL SEPTUM SURGERY      SHOULDER SURGERY Right 2016    TONSILLECTOMY      x 2 due to regrowth    UPPER GASTROINTESTINAL ENDOSCOPY  2010    WISDOM TOOTH EXTRACTION         Social History     Socioeconomic History    Marital status:     Number of children: 0   Tobacco Use    Smoking status: Never     Passive exposure: Never    Smokeless tobacco: Never   Vaping Use    Vaping status: Never Used   Substance and Sexual Activity    Alcohol use: No    Drug use: Never    Sexual activity: Yes     Partners: Male     Birth control/protection:  None     Family History   Problem Relation Age of Onset    Diabetes Father     Gallbladder disease Mother 30    Rheum arthritis Mother     Hypertension Mother     Endometriosis Mother     Irritable bowel syndrome Mother     Pancreatitis Mother     Breast cancer Maternal Grandmother     Heart disease Maternal Grandfather     Breast cancer Maternal Aunt     Congenital heart disease Cousin     Malsarah Hyperthermia Neg Hx     Deep vein thrombosis Neg Hx     Pulmonary embolism Neg Hx       Allergies   Allergen Reactions    Egg-Derived Products Swelling     Eyes swell shut, has some eggs cooked in food.    Other Other (See Comments)     FOOD ALLERGIES: see list : peanuts and ranch dressing      Shellfish-Derived Products Anaphylaxis    Nitrofurantoin Unknown (See Comments) and Rash     Unsure    Unsure    Peanut Butter Flavor [Flavoring Agent] Hives    Ciprofloxacin Rash    Erythromycin Rash     As a baby, no interaction since    Ibuprofen Rash     Has tolerated low dose in the past    Multihance [Gadobenate] Hives     Patient experienced one small hive on neck. Was instructed to be pre-medicated per radiologist prior to MRI's for future.     Sulfa Antibiotics Rash    Triamcinolone Rash      Current Outpatient Medications on File Prior to Visit   Medication Sig Dispense Refill    acetaminophen (TYLENOL) 325 MG tablet Take 1 tablet by mouth Every 6 (Six) Hours As Needed.      albuterol (VENTOLIN HFA) 108 (90 Base) MCG/ACT inhaler Inhale 2 puffs Every 4 (Four) Hours As Needed for Wheezing. 18 g 1    Alcohol Swabs 70 % pads Use 1 Swab Daily. 100 each 11    aspirin (ASPIR) 81 MG EC tablet Take one tablet by mouth daily until 36 weeks gestation. 90 tablet 3    cholestyramine (Questran) 4 g packet Take 1 packet by mouth Daily. 20 packet 2    diphenhydrAMINE (BENADRYL) 25 mg capsule Take 1 capsule by mouth Every 6 (Six) Hours As Needed for Itching.      doxylamine-pyridoxine ER (Bonjesta) 20-20 MG tablet controlled-release tablet  "Take 1 tablet by mouth 2 (Two) Times a Day. 60 tablet 0    Enoxaparin Sodium (LOVENOX) 40 MG/0.4ML solution prefilled syringe syringe Inject 0.4 mL under the skin into the appropriate area as directed Daily. 12 mL 11    EPINEPHrine (EPIPEN) 0.3 MG/0.3ML solution auto-injector injection       ferrous sulfate 325 (65 FE) MG tablet Take 1 tablet by mouth Daily With Breakfast. 90 tablet 2    Heparin Sodium, Porcine, (heparin, porcine,) 97515 UNIT/ML injection Inject 1 ml under the skin twice daily 60 mL 2    hydroxychloroquine (PLAQUENIL) 200 MG tablet Take 1 tablet by mouth Daily.      pantoprazole (Protonix) 40 MG EC tablet Take 1 tablet by mouth Daily. 90 tablet 2    Polysaccharide Iron Complex (ProFe) 391.3 (180 Fe) MG capsule Take  by mouth.      Prenatal Vit-Fe Fumarate-FA (prenatal vitamin 27-0.8) 27-0.8 MG tablet tablet Take  by mouth Daily.      sertraline (ZOLOFT) 50 MG tablet       Syringe/Needle, Disp, (BD Eclipse Syringe/Needle) 25G X 5/8\" 3 ML misc Use to inject heparin 84 each 0    ursodiol (ACTIGALL) 500 MG tablet Take 1 tablet by mouth 3 (Three) Times a Day. 90 tablet 11     No current facility-administered medications on file prior to visit.        Past obstetric, gynecological, medical, surgical, family and social history reviewed.  Relevant lab work and imaging reviewed.    Review of systems  Constitutional:  denies fever, chills, malaise.   ENT/Mouth:  denies sore throat, tinnitus  Eyes: denies vision changes/pain  CV:  denies chest pain  Respiratory:  denies cough/SOB  GI:  denies N/V, diarrhea, abdominal pain.    :   denies dysuria  Skin:  denies lesions or pruritus   Neuro:  denies weakness, focal neurologic symptoms    Vitals:    07/09/24 1026   BP: 136/86   BP Location: Right arm   Patient Position: Sitting   Pulse: 92   Temp: 99.1 °F (37.3 °C)   TempSrc: Temporal   SpO2: 99%   Weight: 105 kg (231 lb)   Height: 162.6 cm (64\")         PHYSICAL EXAM   GENERAL: Not in acute distress, AAOx3, " pleasant  CARDIO: regular rate and rhythm  PULM: symmetric chest rise, speaking in complete sentences without difficulty  NEURO: awake, alert and oriented to person, place, and time  ABDOMINAL: No fundal tenderness, no rebound or guarding, gravid  EXTREMITIES: no bilateral lower extremity edema/tenderness  SKIN: Warm, well-perfused    US:  Cephalic presentation.  Anterior placenta.   SHARI 26-28 cm, consistent with  mild stable polyhydramnios.   BPP /8.     ASSESSMENT/COUNSELIN y.o.   at  34 3/7 weeks gestation (Estimated Date of Delivery: 24).     -Pregnancy  [ X ] stable  [   ] improving [  ] worsening    Diagnoses and all orders for this visit:    1. Cholestasis of pregnancy in third trimester (Primary)  -     Bile Acids, Total  -     Comprehensive Metabolic Panel; Future  -     Urinalysis With Microscopic - Urine, Clean Catch; Future  -     Urine Culture - Urine, Urine, Clean Catch; Future    2. Antiphospholipid antibody syndrome complicating pregnancy- cardiolipin AB +    3. Polyhydramnios affecting pregnancy    4. Systemic lupus erythematosus- + anticardio AB, neg SS-A/SS-B          SLE:  Previously counseled.  Patient doesn't have an official dx of SLE, but began having joint symptoms with swelling right before she got pregnant.  Her mother has a diagnosis of RA.  She has seen rheumatology and met some but not all criteria for Lupus.  However, she is APLAS positive.  Given her symptoms/constellation of findings, she likely has Lupus or a similar autoimmune issue with similar risk factors during pregnancy, so we will treat her like lupus.  She is on plaquenil and doing well on 200 a day.  Her flares involve her joints and she has never had cardiac, renal, or neuro manifestations.  Her baseline P/C is 116 and BL Cr is 0.61 prepregnancy.  Platelets 431 earlier this year.  She was negative for Ro/La antibodies earlier this year.        I recommend she continue following with her rheumatologist as  they recommend and have complement levels and Anti-DS DNA levels each trimester. These will be helpful for comparison if she develops a flare or concern for preeclampsia during this pregnancy.  She will need serial growth exams and twice weekly ANFS starting by 32 weeks between me and her primary OB.      PIH labs appropriate.     Cholestasis:  Previously counseled.    Her bile acid has been bouncing around but just bumped up again and now she is having itching.  Discussed that with severe cholestasis (>40), significant itching despite medication, rising liver enzymes, etc. In the late  period, consideration can be given to delivery with severe cholestasis if it appears to be worsening to try to prevent stillbirth.      Discussed if labs worse/ liver enzymes elevated, recommend delivery.  If not, would repeat labs on Friday.      Would recommend delivery 36 weeks right now and if continues to rise/pt has other symptoms/rising liver enzymes, would recommend delivery after 34 weeks.      APLAS with no personal history of VTE  I discussed with the patient her diagnosis of APLAS, which is based on lupus and her positive lab tests.  We reviewed the risks of pregnancy with APLAS including but not limited to miscarriage, stillbirth, fetal growth restriction, preeclampsia, indicated  birth, and venous thromboembolic event.    We discussed decreasing the risk of these complications with LMWH and baby aspirin throughout pregnancy and for 4-6 weeks post partum.  We also recommend serial growth ultrasound and ANFS twice weekly beyond 32 weeks.    PRUV:  We discussed that the umbilical cord starts off with two umbilical arteries (right and left) and two umbilical veins (right and left). During the 6th week of gestation, the right umbilical vein is supposed to disappear and the left umbilical vein should be the only one remaining. In persistent right umbilical vein, the right vein stays open while the left vein  disappears. The incidence of PRUV is 0.17%; of the babies that have PRUV, 13.5% will have other anomalies which also means that 86.5% of babies with PRUV have an isolated (no other birth defects) PRUV. Anomalies that have been associated with PRUV include cardiovascular, nervous, urinary, skeleton and respiratory anomalies. No other anomalies visualized, but she is increased risk for a heart defect for multiple reasons, so will get fetal ECHO.  She most likely falls into the 86.5% of patients that have isolated PRUV and is a normal variant. We discussed that ultrasound cannot rule out all anomalies.      Polyhydramnios:  Passed 3 hour.  Mild and stable.      Urinary urgency:  Sent UA    Summary of Plan  -Serial growth ultrasounds every 4 weeks (MFM)   -Starting at 32 weeks: twice Weekly fetal  surveillance until delivery--we will do NSTs here on  and BPP's at Dr. Hollins's office on .  We will switch the NST for a growth every 4 weeks.    -Continue ursodiol and repeat bile acids/CMP TWICE WEEKLY--may need to proceed with delivery today if labs worsening.    -Continue ASA and Lovenox--switch to heparin at 34 weeks (heparin 10,000 u BID)  -Continue plaquenil and following with rheum  -Delivery 34-36 weeks for APLAS, anticoagulation, lupus, cholestasis--36 weeks right now but low threshold after 34 if changes occur.    Follow-up: weekly    Thank you for the consult and opportunity to care for this patient.  Please feel free to reach out with any questions or concerns.      I spent 20 minutes caring for this patient on this date of service. This time includes time spent by me in the following activities: preparing for the visit, reviewing tests, obtaining and/or reviewing a separately obtained history, performing a medically appropriate examination and/or evaluation, counseling and educating the patient/family/caregiver and independently interpreting results and communicating that information with  the patient/family/caregiver with greater than 50% spent in counseling and coordination of care.       I spent 4 minutes on the separately reported service of US imaging not included in the time used to support the E/M service also reported today.      Beverley Patino MD FACOG  Maternal Fetal Medicine-[facility]  Office: [phone]  [email or web address]

## 2024-07-09 NOTE — LETTER
2024       No Recipients    Patient: Carina Galindo   YOB: 1995   Date of Visit: 2024       Dear Tammy Hollins MD    Carina Galindo was in my office today. Below is a copy of my note.    If you have questions, please do not hesitate to call me. I look forward to following Carina along with you.         Sincerely,        Beverley Patino MD      MATERNAL FETAL MEDICINE Consult Note    Dear Dr Tammy Hollins MD:    Thank you for your kind referral of Carina Galindo.  As you know, she is a 29 y.o.   at  34 3/7 weeks gestation (Estimated Date of Delivery: 24). This is a consult.      Her antepartum course is complicated by:  SLE  APLAS  Cholestasis  Mild polyhydramnios    Aneuploidy Screening: low risk    HPI: Today, she denies headache, blurry vision, RUQ pain. No vaginal bleeding, no contractions.     Review of History:  Past Medical History:   Diagnosis Date   • Antiphospholipid syndrome    • Anxiety    • exercise induced asthma    • GERD (gastroesophageal reflux disease)    • Iron deficiency anemia    • Knee meniscus pain, left    • Liver cyst     BX AND FOUND TO BE NEGATIVE   • Lupus    • OCD (obsessive compulsive disorder)    • Rheumatoid factor positive      Past Surgical History:   Procedure Laterality Date   • ADENOIDECTOMY     • CHOLECYSTECTOMY WITH INTRAOPERATIVE CHOLANGIOGRAM N/A 2016    Procedure: CHOLECYSTECTOMY LAPAROSCOPIC INTRAOPERATIVE CHOLANGIOGRAM;  Surgeon: Malathi Lozano MD;  Location: [facility] OR [facility];  Service:    • COLONOSCOPY     • ENDOSCOPY  2013    Gastritis    • KNEE ARTHROSCOPY Left 3/28/2023    Procedure: Knee Arthroscopy with lateral release, meniscectomy and cyst excision;  Surgeon: Tapan Craig MD;  Location: [facility] OR [facility];  Service: Orthopedics;  Laterality: Left;   • NASAL SEPTUM SURGERY     • SHOULDER SURGERY Right 2016   • TONSILLECTOMY      x 2 due to regrowth   • UPPER GASTROINTESTINAL ENDOSCOPY     • WISDOM  TOOTH EXTRACTION         Social History     Socioeconomic History   • Marital status:    • Number of children: 0   Tobacco Use   • Smoking status: Never     Passive exposure: Never   • Smokeless tobacco: Never   Vaping Use   • Vaping status: Never Used   Substance and Sexual Activity   • Alcohol use: No   • Drug use: Never   • Sexual activity: Yes     Partners: Male     Birth control/protection: None     Family History   Problem Relation Age of Onset   • Diabetes Father    • Gallbladder disease Mother 30   • Rheum arthritis Mother    • Hypertension Mother    • Endometriosis Mother    • Irritable bowel syndrome Mother    • Pancreatitis Mother    • Breast cancer Maternal Grandmother    • Heart disease Maternal Grandfather    • Breast cancer Maternal Aunt    • Congenital heart disease Cousin    • Malig Hyperthermia Neg Hx    • Deep vein thrombosis Neg Hx    • Pulmonary embolism Neg Hx       Allergies   Allergen Reactions   • Egg-Derived Products Swelling     Eyes swell shut, has some eggs cooked in food.   • Other Other (See Comments)     FOOD ALLERGIES: see list : peanuts and ranch dressing     • Shellfish-Derived Products Anaphylaxis   • Nitrofurantoin Unknown (See Comments) and Rash     Unsure    Unsure   • Peanut Butter Flavor [Flavoring Agent] Hives   • Ciprofloxacin Rash   • Erythromycin Rash     As a baby, no interaction since   • Ibuprofen Rash     Has tolerated low dose in the past   • Multihance [Gadobenate] Hives     Patient experienced one small hive on neck. Was instructed to be pre-medicated per radiologist prior to MRI's for future.    • Sulfa Antibiotics Rash   • Triamcinolone Rash      Current Outpatient Medications on File Prior to Visit   Medication Sig Dispense Refill   • acetaminophen (TYLENOL) 325 MG tablet Take 1 tablet by mouth Every 6 (Six) Hours As Needed.     • albuterol (VENTOLIN HFA) 108 (90 Base) MCG/ACT inhaler Inhale 2 puffs Every 4 (Four) Hours As Needed for Wheezing. 18 g 1   •  "Alcohol Swabs 70 % pads Use 1 Swab Daily. 100 each 11   • aspirin (ASPIR) 81 MG EC tablet Take one tablet by mouth daily until 36 weeks gestation. 90 tablet 3   • cholestyramine (Questran) 4 g packet Take 1 packet by mouth Daily. 20 packet 2   • diphenhydrAMINE (BENADRYL) 25 mg capsule Take 1 capsule by mouth Every 6 (Six) Hours As Needed for Itching.     • doxylamine-pyridoxine ER (Bonjesta) 20-20 MG tablet controlled-release tablet Take 1 tablet by mouth 2 (Two) Times a Day. 60 tablet 0   • Enoxaparin Sodium (LOVENOX) 40 MG/0.4ML solution prefilled syringe syringe Inject 0.4 mL under the skin into the appropriate area as directed Daily. 12 mL 11   • EPINEPHrine (EPIPEN) 0.3 MG/0.3ML solution auto-injector injection      • ferrous sulfate 325 (65 FE) MG tablet Take 1 tablet by mouth Daily With Breakfast. 90 tablet 2   • Heparin Sodium, Porcine, (heparin, porcine,) 53840 UNIT/ML injection Inject 1 ml under the skin twice daily 60 mL 2   • hydroxychloroquine (PLAQUENIL) 200 MG tablet Take 1 tablet by mouth Daily.     • pantoprazole (Protonix) 40 MG EC tablet Take 1 tablet by mouth Daily. 90 tablet 2   • Polysaccharide Iron Complex (ProFe) 391.3 (180 Fe) MG capsule Take  by mouth.     • Prenatal Vit-Fe Fumarate-FA (prenatal vitamin 27-0.8) 27-0.8 MG tablet tablet Take  by mouth Daily.     • sertraline (ZOLOFT) 50 MG tablet      • Syringe/Needle, Disp, (BD Eclipse Syringe/Needle) 25G X 5/8\" 3 ML misc Use to inject heparin 84 each 0   • ursodiol (ACTIGALL) 500 MG tablet Take 1 tablet by mouth 3 (Three) Times a Day. 90 tablet 11     No current facility-administered medications on file prior to visit.        Past obstetric, gynecological, medical, surgical, family and social history reviewed.  Relevant lab work and imaging reviewed.    Review of systems  Constitutional:  denies fever, chills, malaise.   ENT/Mouth:  denies sore throat, tinnitus  Eyes: denies vision changes/pain  CV:  denies chest pain  Respiratory:  denies " "cough/SOB  GI:  denies N/V, diarrhea, abdominal pain.    :   denies dysuria  Skin:  denies lesions or pruritus   Neuro:  denies weakness, focal neurologic symptoms    Vitals:    24 1026   BP: 136/86   BP Location: Right arm   Patient Position: Sitting   Pulse: 92   Temp: 99.1 °F (37.3 °C)   TempSrc: Temporal   SpO2: 99%   Weight: 105 kg (231 lb)   Height: 162.6 cm (64\")         PHYSICAL EXAM   GENERAL: Not in acute distress, AAOx3, pleasant  CARDIO: regular rate and rhythm  PULM: symmetric chest rise, speaking in complete sentences without difficulty  NEURO: awake, alert and oriented to person, place, and time  ABDOMINAL: No fundal tenderness, no rebound or guarding, gravid  EXTREMITIES: no bilateral lower extremity edema/tenderness  SKIN: Warm, well-perfused    US:  Cephalic presentation.  Anterior placenta.   SHARI 26-28 cm, consistent with  mild stable polyhydramnios.   BPP 8/8.     ASSESSMENT/COUNSELIN y.o.   at  34 3/7 weeks gestation (Estimated Date of Delivery: 24).     -Pregnancy  [ X ] stable  [   ] improving [  ] worsening    Diagnoses and all orders for this visit:    1. Cholestasis of pregnancy in third trimester (Primary)  -     Bile Acids, Total  -     Comprehensive Metabolic Panel; Future  -     Urinalysis With Microscopic - Urine, Clean Catch; Future  -     Urine Culture - Urine, Urine, Clean Catch; Future    2. Antiphospholipid antibody syndrome complicating pregnancy- cardiolipin AB +    3. Polyhydramnios affecting pregnancy    4. Systemic lupus erythematosus- + anticardio AB, neg SS-A/SS-B          SLE:  Previously counseled.  Patient doesn't have an official dx of SLE, but began having joint symptoms with swelling right before she got pregnant.  Her mother has a diagnosis of RA.  She has seen rheumatology and met some but not all criteria for Lupus.  However, she is APLAS positive.  Given her symptoms/constellation of findings, she likely has Lupus or a similar autoimmune " issue with similar risk factors during pregnancy, so we will treat her like lupus.  She is on plaquenil and doing well on 200 a day.  Her flares involve her joints and she has never had cardiac, renal, or neuro manifestations.  Her baseline P/C is 116 and BL Cr is 0.61 prepregnancy.  Platelets 431 earlier this year.  She was negative for Ro/La antibodies earlier this year.        I recommend she continue following with her rheumatologist as they recommend and have complement levels and Anti-DS DNA levels each trimester. These will be helpful for comparison if she develops a flare or concern for preeclampsia during this pregnancy.  She will need serial growth exams and twice weekly ANFS starting by 32 weeks between me and her primary OB.      PIH labs appropriate.     Cholestasis:  Previously counseled.    Her bile acid has been bouncing around but just bumped up again and now she is having itching.  Discussed that with severe cholestasis (>40), significant itching despite medication, rising liver enzymes, etc. In the late  period, consideration can be given to delivery with severe cholestasis if it appears to be worsening to try to prevent stillbirth.      Discussed if labs worse/ liver enzymes elevated, recommend delivery.  If not, would repeat labs on Friday.      Would recommend delivery 36 weeks right now and if continues to rise/pt has other symptoms/rising liver enzymes, would recommend delivery after 34 weeks.      APLAS with no personal history of VTE  I discussed with the patient her diagnosis of APLAS, which is based on lupus and her positive lab tests.  We reviewed the risks of pregnancy with APLAS including but not limited to miscarriage, stillbirth, fetal growth restriction, preeclampsia, indicated  birth, and venous thromboembolic event.    We discussed decreasing the risk of these complications with LMWH and baby aspirin throughout pregnancy and for 4-6 weeks post partum.  We also  recommend serial growth ultrasound and ANFS twice weekly beyond 32 weeks.    PRUV:  We discussed that the umbilical cord starts off with two umbilical arteries (right and left) and two umbilical veins (right and left). During the 6th week of gestation, the right umbilical vein is supposed to disappear and the left umbilical vein should be the only one remaining. In persistent right umbilical vein, the right vein stays open while the left vein disappears. The incidence of PRUV is 0.17%; of the babies that have PRUV, 13.5% will have other anomalies which also means that 86.5% of babies with PRUV have an isolated (no other birth defects) PRUV. Anomalies that have been associated with PRUV include cardiovascular, nervous, urinary, skeleton and respiratory anomalies. No other anomalies visualized, but she is increased risk for a heart defect for multiple reasons, so will get fetal ECHO.  She most likely falls into the 86.5% of patients that have isolated PRUV and is a normal variant. We discussed that ultrasound cannot rule out all anomalies.      Polyhydramnios:  Passed 3 hour.  Mild and stable.      Urinary urgency:  Sent UA    Summary of Plan  -Serial growth ultrasounds every 4 weeks (MFM)   -Starting at 32 weeks: twice Weekly fetal  surveillance until delivery--we will do NSTs here on  and BPP's at Dr. Hollins's office on .  We will switch the NST for a growth every 4 weeks.    -Continue ursodiol and repeat bile acids/CMP TWICE WEEKLY--may need to proceed with delivery today if labs worsening.    -Continue ASA and Lovenox--switch to heparin at 34 weeks (heparin 10,000 u BID)  -Continue plaquenil and following with rheum  -Delivery 34-36 weeks for APLAS, anticoagulation, lupus, cholestasis--36 weeks right now but low threshold after 34 if changes occur.    Follow-up: weekly    Thank you for the consult and opportunity to care for this patient.  Please feel free to reach out with any questions or  concerns.      I spent 20 minutes caring for this patient on this date of service. This time includes time spent by me in the following activities: preparing for the visit, reviewing tests, obtaining and/or reviewing a separately obtained history, performing a medically appropriate examination and/or evaluation, counseling and educating the patient/family/caregiver and independently interpreting results and communicating that information with the patient/family/caregiver with greater than 50% spent in counseling and coordination of care.       I spent 4 minutes on the separately reported service of US imaging not included in the time used to support the E/M service also reported today.      Beverley Patino MD FACOG  Maternal Fetal Medicine-[facility]  Office: [phone]  [email or web address]

## 2024-07-09 NOTE — PROGRESS NOTES
Pt reports that she is doing well and denies vaginal bleeding, cramping, contractions or LOF at this time. Patient reports occasional vaginal pressure, states that it is not constant. Reports active fetal movement. Reviewed when to call OB office or present to L&D for evaluation with symptoms such as decreased fetal movement, vaginal bleeding, LOF or ctxs. Pt verbalized understanding. Denies HA, visual changes or epigastric pain. Denies any additional complaints at time of appointment. Next OB appointment scheduled for 07/12/2024    Vitals:    07/09/24 1026   BP: 136/86   Pulse: 92   Temp: 99.1 °F (37.3 °C)   SpO2: 99%

## 2024-07-10 ENCOUNTER — TELEPHONE (OUTPATIENT)
Dept: OBSTETRICS AND GYNECOLOGY | Facility: CLINIC | Age: 29
End: 2024-07-10
Payer: COMMERCIAL

## 2024-07-10 LAB — BACTERIA SPEC AEROBE CULT: NORMAL

## 2024-07-10 NOTE — TELEPHONE ENCOUNTER
Call placed to Carina to review negative urine cultures. Pt encouraged to let OB know if there is any continued UTI symptoms. Pt has no questions at this time.

## 2024-07-12 ENCOUNTER — LAB (OUTPATIENT)
Facility: HOSPITAL | Age: 29
End: 2024-07-12
Payer: COMMERCIAL

## 2024-07-12 ENCOUNTER — HOSPITAL ENCOUNTER (OUTPATIENT)
Facility: HOSPITAL | Age: 29
Discharge: HOME | End: 2024-07-12
Attending: OBSTETRICS & GYNECOLOGY | Admitting: OBSTETRICS & GYNECOLOGY
Payer: COMMERCIAL

## 2024-07-12 ENCOUNTER — PREP FOR SURGERY (OUTPATIENT)
Dept: OTHER | Facility: HOSPITAL | Age: 29
End: 2024-07-12
Payer: COMMERCIAL

## 2024-07-12 ENCOUNTER — ROUTINE PRENATAL (OUTPATIENT)
Dept: OBSTETRICS AND GYNECOLOGY | Age: 29
End: 2024-07-12
Payer: COMMERCIAL

## 2024-07-12 VITALS — WEIGHT: 233 LBS | DIASTOLIC BLOOD PRESSURE: 82 MMHG | SYSTOLIC BLOOD PRESSURE: 112 MMHG | BODY MASS INDEX: 39.99 KG/M2

## 2024-07-12 VITALS
RESPIRATION RATE: 18 BRPM | SYSTOLIC BLOOD PRESSURE: 116 MMHG | DIASTOLIC BLOOD PRESSURE: 77 MMHG | OXYGEN SATURATION: 99 % | HEART RATE: 95 BPM | TEMPERATURE: 98.4 F

## 2024-07-12 DIAGNOSIS — M32.9 SYSTEMIC LUPUS ERYTHEMATOSUS, MATERNAL, ANTEPARTUM: ICD-10-CM

## 2024-07-12 DIAGNOSIS — D68.61 ANTIPHOSPHOLIPID ANTIBODY SYNDROME COMPLICATING PREGNANCY: Primary | ICD-10-CM

## 2024-07-12 DIAGNOSIS — O26.643 CHOLESTASIS OF PREGNANCY IN THIRD TRIMESTER: Primary | ICD-10-CM

## 2024-07-12 DIAGNOSIS — O99.210 OBESITY IN PREGNANCY, ANTEPARTUM: ICD-10-CM

## 2024-07-12 DIAGNOSIS — O99.119 ANTIPHOSPHOLIPID ANTIBODY SYNDROME COMPLICATING PREGNANCY: Primary | ICD-10-CM

## 2024-07-12 DIAGNOSIS — O99.019 MATERNAL ANEMIA IN PREGNANCY, ANTEPARTUM: ICD-10-CM

## 2024-07-12 DIAGNOSIS — D68.61 ANTIPHOSPHOLIPID ANTIBODY SYNDROME COMPLICATING PREGNANCY: ICD-10-CM

## 2024-07-12 DIAGNOSIS — O99.891 SYSTEMIC LUPUS ERYTHEMATOSUS, MATERNAL, ANTEPARTUM: ICD-10-CM

## 2024-07-12 DIAGNOSIS — O26.643 INTRAHEPATIC CHOLESTASIS OF PREGNANCY IN THIRD TRIMESTER: ICD-10-CM

## 2024-07-12 DIAGNOSIS — O99.119 ANTIPHOSPHOLIPID ANTIBODY SYNDROME COMPLICATING PREGNANCY: ICD-10-CM

## 2024-07-12 DIAGNOSIS — Z13.89 SCREENING FOR BLOOD OR PROTEIN IN URINE: Primary | ICD-10-CM

## 2024-07-12 DIAGNOSIS — F41.9 ANXIETY: ICD-10-CM

## 2024-07-12 DIAGNOSIS — Z36.85 ANTENATAL SCREENING FOR STREPTOCOCCUS B: ICD-10-CM

## 2024-07-12 LAB
ALBUMIN SERPL-MCNC: 3.1 G/DL (ref 3.5–5.2)
ALBUMIN/GLOB SERPL: 0.9 G/DL
ALP SERPL-CCNC: 228 U/L (ref 39–117)
ALT SERPL W P-5'-P-CCNC: 25 U/L (ref 1–33)
ANION GAP SERPL CALCULATED.3IONS-SCNC: 11.1 MMOL/L (ref 5–15)
AST SERPL-CCNC: 21 U/L (ref 1–32)
BILE AC SERPL-SCNC: 30 UMOL/L (ref 0–10)
BILIRUB BLD-MCNC: NEGATIVE MG/DL
BILIRUB SERPL-MCNC: 0.3 MG/DL (ref 0–1.2)
BUN SERPL-MCNC: 3 MG/DL (ref 6–20)
BUN/CREAT SERPL: 6.3 (ref 7–25)
CALCIUM SPEC-SCNC: 8.7 MG/DL (ref 8.6–10.5)
CHLORIDE SERPL-SCNC: 106 MMOL/L (ref 98–107)
CO2 SERPL-SCNC: 20.9 MMOL/L (ref 22–29)
CREAT SERPL-MCNC: 0.48 MG/DL (ref 0.57–1)
DEPRECATED RDW RBC AUTO: 37.6 FL (ref 37–54)
EGFRCR SERPLBLD CKD-EPI 2021: 130.1 ML/MIN/1.73
ERYTHROCYTE [DISTWIDTH] IN BLOOD BY AUTOMATED COUNT: 11.9 % (ref 12.3–15.4)
GLOBULIN UR ELPH-MCNC: 3.4 GM/DL
GLUCOSE SERPL-MCNC: 76 MG/DL (ref 65–99)
GLUCOSE UR STRIP-MCNC: NEGATIVE MG/DL
HCT VFR BLD AUTO: 36.1 % (ref 34–46.6)
HGB BLD-MCNC: 12.1 G/DL (ref 12–15.9)
KETONES UR QL: NEGATIVE
LEUKOCYTE EST, POC: ABNORMAL
MCH RBC QN AUTO: 29.2 PG (ref 26.6–33)
MCHC RBC AUTO-ENTMCNC: 33.5 G/DL (ref 31.5–35.7)
MCV RBC AUTO: 87 FL (ref 79–97)
NITRITE UR-MCNC: NEGATIVE MG/ML
PH UR: 7 [PH] (ref 5–8)
PLATELET # BLD AUTO: 332 10*3/MM3 (ref 140–450)
PMV BLD AUTO: 9.9 FL (ref 6–12)
POTASSIUM SERPL-SCNC: 3.3 MMOL/L (ref 3.5–5.2)
PROT SERPL-MCNC: 6.5 G/DL (ref 6–8.5)
PROT UR STRIP-MCNC: NEGATIVE MG/DL
RBC # BLD AUTO: 4.15 10*6/MM3 (ref 3.77–5.28)
RBC # UR STRIP: NEGATIVE /UL
SODIUM SERPL-SCNC: 138 MMOL/L (ref 136–145)
SP GR UR: 1.01 (ref 1–1.03)
UROBILINOGEN UR QL: NORMAL
WBC NRBC COR # BLD AUTO: 13.39 10*3/MM3 (ref 3.4–10.8)

## 2024-07-12 PROCEDURE — 85027 COMPLETE CBC AUTOMATED: CPT | Performed by: OBSTETRICS & GYNECOLOGY

## 2024-07-12 PROCEDURE — 36415 COLL VENOUS BLD VENIPUNCTURE: CPT | Performed by: OBSTETRICS & GYNECOLOGY

## 2024-07-12 PROCEDURE — 59025 FETAL NON-STRESS TEST: CPT

## 2024-07-12 PROCEDURE — 82239 BILE ACIDS TOTAL: CPT | Performed by: OBSTETRICS & GYNECOLOGY

## 2024-07-12 PROCEDURE — 59025 FETAL NON-STRESS TEST: CPT | Performed by: OBSTETRICS & GYNECOLOGY

## 2024-07-12 PROCEDURE — 80053 COMPREHEN METABOLIC PANEL: CPT | Performed by: OBSTETRICS & GYNECOLOGY

## 2024-07-12 PROCEDURE — G0463 HOSPITAL OUTPT CLINIC VISIT: HCPCS

## 2024-07-12 RX ORDER — OXYTOCIN/0.9 % SODIUM CHLORIDE 30/500 ML
999 PLASTIC BAG, INJECTION (ML) INTRAVENOUS ONCE
OUTPATIENT
Start: 2024-07-12 | End: 2024-07-12

## 2024-07-12 RX ORDER — SODIUM CHLORIDE 9 MG/ML
40 INJECTION, SOLUTION INTRAVENOUS AS NEEDED
OUTPATIENT
Start: 2024-07-12

## 2024-07-12 RX ORDER — MISOPROSTOL 100 MCG
25 TABLET ORAL
OUTPATIENT
Start: 2024-07-12 | End: 2024-07-12

## 2024-07-12 RX ORDER — ONDANSETRON 4 MG/1
4 TABLET, ORALLY DISINTEGRATING ORAL EVERY 6 HOURS PRN
OUTPATIENT
Start: 2024-07-12

## 2024-07-12 RX ORDER — CARBOPROST TROMETHAMINE 250 UG/ML
250 INJECTION, SOLUTION INTRAMUSCULAR
OUTPATIENT
Start: 2024-07-12

## 2024-07-12 RX ORDER — TRANEXAMIC ACID 10 MG/ML
1000 INJECTION, SOLUTION INTRAVENOUS ONCE AS NEEDED
OUTPATIENT
Start: 2024-07-12

## 2024-07-12 RX ORDER — OXYTOCIN/0.9 % SODIUM CHLORIDE 30/500 ML
250 PLASTIC BAG, INJECTION (ML) INTRAVENOUS CONTINUOUS
OUTPATIENT
Start: 2024-07-12 | End: 2024-07-12

## 2024-07-12 RX ORDER — TERBUTALINE SULFATE 1 MG/ML
0.25 INJECTION, SOLUTION SUBCUTANEOUS AS NEEDED
OUTPATIENT
Start: 2024-07-12

## 2024-07-12 RX ORDER — METHYLERGONOVINE MALEATE 0.2 MG/ML
200 INJECTION INTRAVENOUS ONCE AS NEEDED
OUTPATIENT
Start: 2024-07-12

## 2024-07-12 RX ORDER — SODIUM CHLORIDE, SODIUM LACTATE, POTASSIUM CHLORIDE, CALCIUM CHLORIDE 600; 310; 30; 20 MG/100ML; MG/100ML; MG/100ML; MG/100ML
125 INJECTION, SOLUTION INTRAVENOUS CONTINUOUS
OUTPATIENT
Start: 2024-07-12

## 2024-07-12 RX ORDER — MAGNESIUM CARB/ALUMINUM HYDROX 105-160MG
30 TABLET,CHEWABLE ORAL ONCE AS NEEDED
OUTPATIENT
Start: 2024-07-12

## 2024-07-12 RX ORDER — MISOPROSTOL 200 UG/1
800 TABLET ORAL ONCE AS NEEDED
OUTPATIENT
Start: 2024-07-12

## 2024-07-12 RX ORDER — FAMOTIDINE 20 MG/1
20 TABLET, FILM COATED ORAL 2 TIMES DAILY PRN
OUTPATIENT
Start: 2024-07-12

## 2024-07-12 RX ORDER — FAMOTIDINE 10 MG/ML
20 INJECTION, SOLUTION INTRAVENOUS 2 TIMES DAILY PRN
OUTPATIENT
Start: 2024-07-12

## 2024-07-12 RX ORDER — LIDOCAINE HYDROCHLORIDE 10 MG/ML
0.5 INJECTION, SOLUTION INFILTRATION; PERINEURAL ONCE AS NEEDED
OUTPATIENT
Start: 2024-07-12

## 2024-07-12 RX ORDER — ONDANSETRON 2 MG/ML
4 INJECTION INTRAMUSCULAR; INTRAVENOUS EVERY 6 HOURS PRN
OUTPATIENT
Start: 2024-07-12

## 2024-07-12 RX ORDER — ACETAMINOPHEN 325 MG/1
650 TABLET ORAL EVERY 4 HOURS PRN
OUTPATIENT
Start: 2024-07-12

## 2024-07-12 RX ORDER — SODIUM CHLORIDE 0.9 % (FLUSH) 0.9 %
10 SYRINGE (ML) INJECTION AS NEEDED
OUTPATIENT
Start: 2024-07-12

## 2024-07-12 RX ORDER — SODIUM CHLORIDE 0.9 % (FLUSH) 0.9 %
10 SYRINGE (ML) INJECTION EVERY 12 HOURS SCHEDULED
OUTPATIENT
Start: 2024-07-12

## 2024-07-12 NOTE — PROGRESS NOTES
Chief Complaint   Patient presents with    Routine Prenatal Visit     Cc: ob check with [facility] , reports good FM , having increase in itching since last week , had nose bleed yesterday , had some belly pain tightness        HPI: 29 y.o.  at 34w6d presents for prenatal care    Last OB US growth (since 2024)       None          Vitals:    24 1111   BP: 112/82   Weight: 106 kg (233 lb)     Total weight gain for pregnancy:  8.618 kg (19 lb)    Review of systems:   Gen: negative  CV:     negative  GI: negative  :   good fetal movement noted  and sudhakar summers type contractions  MS:    negative  Neuro: negative  Pul: negative    Physical Exam  Vitals and nursing note reviewed.   Constitutional:       Appearance: Normal appearance. She is obese.   Pulmonary:      Effort: Pulmonary effort is normal.   Neurological:      Mental Status: She is alert.   Psychiatric:         Mood and Affect: Mood normal.         Thought Content: Thought content normal.         Judgment: Judgment normal.           A/P  1. Intrauterine pregnancy at 34w6d   2. Pregnancy Risk:  HIGH RISK    Diagnoses and all orders for this visit:    1. Screening for blood or protein in urine (Primary)  -     POC Urinalysis Dipstick    2. Maternal anemia in pregnancy, antepartum    3. Obesity in pregnancy, antepartum    4. Intrahepatic cholestasis of pregnancy in third trimester  -     Bile Acids, Total  -     Comprehensive Metabolic Panel  -     CBC (No Diff)    5. Anxiety    6. Systemic lupus erythematosus- + anticardio AB, neg SS-A/SS-B  -     Protein / Creatinine Ratio, Urine - Urine, Clean Catch    7. Antiphospholipid antibody syndrome complicating pregnancy- cardiolipin AB +  -     Protein / Creatinine Ratio, Urine - Urine, Clean Catch    8.  screening for streptococcus B  -     Group B Streptococcus Culture - Swab, Vaginal/Rectum        Pre-eclampsia symptoms were discussed and warnings were given.   labor was discussed.   Warnings were provided.  Nutrition and weight gain were addressed.  -----------------------  PLAN:   Return in about 1 week (around 7/19/2024), or ob check and BPP.  To labor and delivery for serial pressures and NST  BPP today 8 out of 8  GBS collected  Induction of labor scheduled at 36 weeks unless clinically indicated sooner  Strict preeclampsia warnings given to patient  Labs today  Tammy Hollins MD  7/12/2024 13:20 EDT

## 2024-07-12 NOTE — NON STRESS TEST
Carina Galindo, a  at 34w6d with an HENNY of 2024, Date entered prior to episode creation, was seen at [facility] for a nonstress test.    Chief Complaint   Patient presents with    Leg Swelling     Pt presents from office with leg swelling. MD sanchez NST and serial BP       Patient Active Problem List   Diagnosis    Anxiety    Asthma    Antiphospholipid antibody syndrome complicating pregnancy- cardiolipin AB +    Systemic lupus erythematosus- + anticardio AB, neg SS-A/SS-B    Intrahepatic cholestasis of pregnancy in third trimester    Maternal anemia in pregnancy, antepartum    Abnormal glucose tolerance test (GTT) during pregnancy, antepartum    Pregnancy    Obesity in pregnancy, antepartum       Start Time: 1320    Stop Time: 1405    Interpretation A  Nonstress Test Interpretation A: Reactive

## 2024-07-13 LAB
CREAT UR-MCNC: 42 MG/DL
PROT UR-MCNC: 7.2 MG/DL
PROT/CREAT UR: 171.4 MG/G CREA (ref 0–200)

## 2024-07-16 ENCOUNTER — OFFICE VISIT (OUTPATIENT)
Dept: OBSTETRICS AND GYNECOLOGY | Facility: CLINIC | Age: 29
End: 2024-07-16
Payer: COMMERCIAL

## 2024-07-16 ENCOUNTER — HOSPITAL ENCOUNTER (OUTPATIENT)
Dept: ULTRASOUND IMAGING | Facility: HOSPITAL | Age: 29
Discharge: HOME | End: 2024-07-16
Payer: COMMERCIAL

## 2024-07-16 ENCOUNTER — LAB (OUTPATIENT)
Dept: LAB | Facility: HOSPITAL | Age: 29
End: 2024-07-16
Payer: COMMERCIAL

## 2024-07-16 VITALS
BODY MASS INDEX: 39.95 KG/M2 | TEMPERATURE: 98.7 F | HEIGHT: 64 IN | WEIGHT: 234 LBS | OXYGEN SATURATION: 99 % | HEART RATE: 99 BPM | SYSTOLIC BLOOD PRESSURE: 126 MMHG | DIASTOLIC BLOOD PRESSURE: 82 MMHG

## 2024-07-16 DIAGNOSIS — O26.643 INTRAHEPATIC CHOLESTASIS OF PREGNANCY IN THIRD TRIMESTER: Primary | ICD-10-CM

## 2024-07-16 DIAGNOSIS — O40.9XX0 POLYHYDRAMNIOS AFFECTING PREGNANCY: ICD-10-CM

## 2024-07-16 DIAGNOSIS — O26.643 CHOLESTASIS OF PREGNANCY IN THIRD TRIMESTER: ICD-10-CM

## 2024-07-16 DIAGNOSIS — D68.61 ANTIPHOSPHOLIPID ANTIBODY SYNDROME COMPLICATING PREGNANCY: ICD-10-CM

## 2024-07-16 DIAGNOSIS — O99.119 ANTIPHOSPHOLIPID ANTIBODY SYNDROME COMPLICATING PREGNANCY: ICD-10-CM

## 2024-07-16 DIAGNOSIS — M32.9 SYSTEMIC LUPUS ERYTHEMATOSUS, MATERNAL, ANTEPARTUM: ICD-10-CM

## 2024-07-16 DIAGNOSIS — O99.891 SYSTEMIC LUPUS ERYTHEMATOSUS, MATERNAL, ANTEPARTUM: ICD-10-CM

## 2024-07-16 LAB
ALBUMIN SERPL-MCNC: 3.3 G/DL (ref 3.5–5.2)
ALBUMIN/GLOB SERPL: 0.9 G/DL
ALP SERPL-CCNC: 250 U/L (ref 39–117)
ALT SERPL W P-5'-P-CCNC: 23 U/L (ref 1–33)
ANION GAP SERPL CALCULATED.3IONS-SCNC: 10.5 MMOL/L (ref 5–15)
AST SERPL-CCNC: 19 U/L (ref 1–32)
B-HEM STREP SPEC QL CULT: NEGATIVE
BILE AC SERPL-SCNC: 44 UMOL/L (ref 0–10)
BILIRUB SERPL-MCNC: 0.3 MG/DL (ref 0–1.2)
BUN SERPL-MCNC: 4 MG/DL (ref 6–20)
BUN/CREAT SERPL: 7.3 (ref 7–25)
CALCIUM SPEC-SCNC: 9 MG/DL (ref 8.6–10.5)
CHLORIDE SERPL-SCNC: 106 MMOL/L (ref 98–107)
CO2 SERPL-SCNC: 21.5 MMOL/L (ref 22–29)
CREAT SERPL-MCNC: 0.55 MG/DL (ref 0.57–1)
CREAT UR-MCNC: 81.5 MG/DL
EGFRCR SERPLBLD CKD-EPI 2021: 125.9 ML/MIN/1.73
GLOBULIN UR ELPH-MCNC: 3.5 GM/DL
GLUCOSE SERPL-MCNC: 74 MG/DL (ref 65–99)
POTASSIUM SERPL-SCNC: 3.4 MMOL/L (ref 3.5–5.2)
PROT ?TM UR-MCNC: 23 MG/DL
PROT SERPL-MCNC: 6.8 G/DL (ref 6–8.5)
PROT/CREAT UR: 282.2 MG/G CREA (ref 0–200)
SODIUM SERPL-SCNC: 138 MMOL/L (ref 136–145)

## 2024-07-16 PROCEDURE — 76819 FETAL BIOPHYS PROFIL W/O NST: CPT

## 2024-07-16 PROCEDURE — 80053 COMPREHEN METABOLIC PANEL: CPT

## 2024-07-16 PROCEDURE — 36415 COLL VENOUS BLD VENIPUNCTURE: CPT

## 2024-07-16 PROCEDURE — 84156 ASSAY OF PROTEIN URINE: CPT

## 2024-07-16 PROCEDURE — 82570 ASSAY OF URINE CREATININE: CPT

## 2024-07-16 PROCEDURE — 82239 BILE ACIDS TOTAL: CPT

## 2024-07-16 NOTE — LETTER
2024     Tammy Hollins MD  [address] [facility] [address]  [place] [address]    Patient: Carina Galindo   YOB: 1995   Date of Visit: 2024       Dear Tammy Hollins MD,    Thank you for referring Carina Galindo to me for evaluation. Below is a copy of my consult note.    If you have questions, please do not hesitate to call me. I look forward to following Carina along with you.         Sincerely,        ZACH Stratton        CC: No Recipients    MATERNAL FETAL MEDICINE Consult Note    Dear Dr Tammy Hollins MD:    Thank you for your kind referral of Carina Galindo.  As you know, she is a 29 y.o.   at  35 3/7 weeks gestation (Estimated Date of Delivery: 24). This is a consult.      Her antepartum course is complicated by:  SLE  APLAS  Cholestasis  Mild polyhydramnios  PRUV    Aneuploidy Screening: low risk    HPI: Today, she denies headache, blurry vision, RUQ pain. No vaginal bleeding, no contractions.     Review of History:  Past Medical History:   Diagnosis Date   • Antiphospholipid syndrome    • Anxiety    • exercise induced asthma    • GERD (gastroesophageal reflux disease)    • Iron deficiency anemia    • Knee meniscus pain, left    • Liver cyst     BX AND FOUND TO BE NEGATIVE   • Lupus    • OCD (obsessive compulsive disorder)    • Rheumatoid factor positive      Past Surgical History:   Procedure Laterality Date   • ADENOIDECTOMY     • CHOLECYSTECTOMY WITH INTRAOPERATIVE CHOLANGIOGRAM N/A 2016    Procedure: CHOLECYSTECTOMY LAPAROSCOPIC INTRAOPERATIVE CHOLANGIOGRAM;  Surgeon: Malathi Lozano MD;  [place] [facility] [place] [facility] [place]  Service:    • COLONOSCOPY     • ENDOSCOPY  2013    Gastritis    • KNEE ARTHROSCOPY Left 3/28/2023    Procedure: Knee Arthroscopy with lateral release, meniscectomy and cyst excision;  Surgeon: Tapan Craig MD;  [place] [facility] [place] [facility] [place]  Service: Orthopedics;  Laterality: Left;   • NASAL SEPTUM SURGERY      • SHOULDER SURGERY Right 2016   • TONSILLECTOMY      x 2 due to regrowth   • UPPER GASTROINTESTINAL ENDOSCOPY  2010   • WISDOM TOOTH EXTRACTION       Social History     Socioeconomic History   • Marital status:    • Number of children: 0   Tobacco Use   • Smoking status: Never     Passive exposure: Never   • Smokeless tobacco: Never   Vaping Use   • Vaping status: Never Used   Substance and Sexual Activity   • Alcohol use: No   • Drug use: Never   • Sexual activity: Yes     Partners: Male     Birth control/protection: None     Family History   Problem Relation Age of Onset   • Diabetes Father    • Gallbladder disease Mother 30   • Rheum arthritis Mother    • Hypertension Mother    • Endometriosis Mother    • Irritable bowel syndrome Mother    • Pancreatitis Mother    • Breast cancer Maternal Grandmother    • Heart disease Maternal Grandfather    • Breast cancer Maternal Aunt    • Congenital heart disease Cousin    • Malig Hyperthermia Neg Hx    • Deep vein thrombosis Neg Hx    • Pulmonary embolism Neg Hx       Allergies   Allergen Reactions   • Egg-Derived Products Swelling     Eyes swell shut, has some eggs cooked in food.   • Other Other (See Comments)     FOOD ALLERGIES: see list : peanuts and ranch dressing     • Shellfish-Derived Products Anaphylaxis   • Nitrofurantoin Unknown (See Comments) and Rash     Unsure    Unsure   • Peanut Butter Flavor [Flavoring Agent] Hives   • Ciprofloxacin Rash   • Erythromycin Rash     As a baby, no interaction since   • Ibuprofen Rash     Has tolerated low dose in the past   • Multihance [Gadobenate] Hives     Patient experienced one small hive on neck. Was instructed to be pre-medicated per radiologist prior to MRI's for future.    • Sulfa Antibiotics Rash   • Triamcinolone Rash      Current Outpatient Medications on File Prior to Visit   Medication Sig Dispense Refill   • acetaminophen (TYLENOL) 325 MG tablet Take 1 tablet by mouth Every 6 (Six) Hours As Needed.     •  "albuterol (VENTOLIN HFA) 108 (90 Base) MCG/ACT inhaler Inhale 2 puffs Every 4 (Four) Hours As Needed for Wheezing. 18 g 1   • Alcohol Swabs 70 % pads Use 1 Swab Daily. 100 each 11   • aspirin (ASPIR) 81 MG EC tablet Take one tablet by mouth daily until 36 weeks gestation. 90 tablet 3   • cholestyramine (Questran) 4 g packet Take 1 packet by mouth Daily. 20 packet 2   • diphenhydrAMINE (BENADRYL) 25 mg capsule Take 1 capsule by mouth Every 6 (Six) Hours As Needed for Itching.     • doxylamine-pyridoxine ER (Bonjesta) 20-20 MG tablet controlled-release tablet Take 1 tablet by mouth 2 (Two) Times a Day. 60 tablet 0   • Enoxaparin Sodium (LOVENOX) 40 MG/0.4ML solution prefilled syringe syringe Inject 0.4 mL under the skin into the appropriate area as directed Daily. 12 mL 11   • EPINEPHrine (EPIPEN) 0.3 MG/0.3ML solution auto-injector injection      • ferrous sulfate 325 (65 FE) MG tablet Take 1 tablet by mouth Daily With Breakfast. 90 tablet 2   • Heparin Sodium, Porcine, (heparin, porcine,) 08890 UNIT/ML injection Inject 1 ml under the skin twice daily 60 mL 2   • hydroxychloroquine (PLAQUENIL) 200 MG tablet Take 1 tablet by mouth Daily.     • pantoprazole (Protonix) 40 MG EC tablet Take 1 tablet by mouth Daily. 90 tablet 2   • Polysaccharide Iron Complex (ProFe) 391.3 (180 Fe) MG capsule Take  by mouth.     • Prenatal Vit-Fe Fumarate-FA (prenatal vitamin 27-0.8) 27-0.8 MG tablet tablet Take  by mouth Daily.     • sertraline (ZOLOFT) 50 MG tablet      • Syringe/Needle, Disp, (BD Eclipse Syringe/Needle) 25G X 5/8\" 3 ML misc Use to inject heparin 84 each 0   • ursodiol (ACTIGALL) 500 MG tablet Take 1 tablet by mouth 3 (Three) Times a Day. 90 tablet 11     No current facility-administered medications on file prior to visit.        Past obstetric, gynecological, medical, surgical, family and social history reviewed.  Relevant lab work and imaging reviewed.    Review of systems  Constitutional:  denies fever, chills, " "malaise.   ENT/Mouth:  denies sore throat, tinnitus  Eyes: denies vision changes/pain  CV:  denies chest pain  Respiratory:  denies cough/SOB  GI:  denies N/V, diarrhea, abdominal pain.    :   denies dysuria  Skin:  denies lesions or pruritus   Neuro:  denies weakness, focal neurologic symptoms    Vitals:    24 1022   BP: 126/82   BP Location: Right arm   Patient Position: Sitting   Pulse: 99   Temp: 98.7 °F (37.1 °C)   TempSrc: Temporal   SpO2: 99%   Weight: 106 kg (234 lb)   Height: 162.6 cm (64\")     PHYSICAL EXAM   GENERAL: Not in acute distress, AAOx3, pleasant  CARDIO: regular rate and rhythm  PULM: symmetric chest rise, speaking in complete sentences without difficulty  NEURO: awake, alert and oriented to person, place, and time  ABDOMINAL: No fundal tenderness, no rebound or guarding, gravid  EXTREMITIES: no bilateral lower extremity edema/tenderness  SKIN: Warm, well-perfused    ULTRASOUND  Please view full report in [facility].      ASSESSMENT/COUNSELIN y.o.   at  35 3/7 weeks gestation (Estimated Date of Delivery: 24).     -Pregnancy  [ X ] stable  [   ] improving [  ] worsening    Diagnoses and all orders for this visit:    1. Intrahepatic cholestasis of pregnancy in third trimester (Primary)    2. Systemic lupus erythematosus- + anticardio AB, neg SS-A/SS-B    3. Polyhydramnios affecting pregnancy        SLE  Previously counseled.  Patient doesn't have an official dx of SLE, but began having joint symptoms with swelling right before she got pregnant.  Her mother has a diagnosis of RA.  She has seen rheumatology and met some but not all criteria for Lupus.  However, she is APLAS positive.  Given her symptoms/constellation of findings, she likely has Lupus or a similar autoimmune issue with similar risk factors during pregnancy, so we will treat her like lupus.  She is on plaquenil and doing well on 200 a day.  Her flares involve her joints and she has never had cardiac, renal, " or neuro manifestations.  Her baseline P/C is 116 and BL Cr is 0.61 prepregnancy.  Platelets 431 earlier this year.  She was negative for Ro/La antibodies earlier this year.        I recommend she continue following with her rheumatologist as they recommend and have complement levels and Anti-DS DNA levels each trimester. These will be helpful for comparison if she develops a flare or concern for preeclampsia during this pregnancy.  She will need serial growth exams and twice weekly ANFS starting by 32 weeks between me and her primary OB.      PIH labs appropriate.     CHOLESTASIS  Previously counseled.    Her bile acid has been bouncing around but just bumped up again and now she is having itching.  Discussed that with severe cholestasis (>40), significant itching despite medication, rising liver enzymes, etc. In the late  period, consideration can be given to delivery with severe cholestasis if it appears to be worsening to try to prevent stillbirth.      Discussed if labs worse/ liver enzymes elevated, recommend delivery.  If not, would repeat labs on Friday.      Would recommend delivery 36 weeks right now and if continues to rise/pt has other symptoms/rising liver enzymes, would recommend delivery after 34 weeks.      APLAS WITH NO PERSONAL HISTORY OF VTE  Previously counseled.  I discussed with the patient her diagnosis of APLAS, which is based on lupus and her positive lab tests.  We reviewed the risks of pregnancy with APLAS including but not limited to miscarriage, stillbirth, fetal growth restriction, preeclampsia, indicated  birth, and venous thromboembolic event.    We discussed decreasing the risk of these complications with LMWH and baby aspirin throughout pregnancy and for 4-6 weeks post partum.  We also recommend serial growth ultrasound and ANFS twice weekly beyond 32 weeks.    PRUV  Previously Counseled.   We discussed that the umbilical cord starts off with two umbilical arteries  (right and left) and two umbilical veins (right and left). During the 6th week of gestation, the right umbilical vein is supposed to disappear and the left umbilical vein should be the only one remaining. In persistent right umbilical vein, the right vein stays open while the left vein disappears. The incidence of PRUV is 0.17%; of the babies that have PRUV, 13.5% will have other anomalies which also means that 86.5% of babies with PRUV have an isolated (no other birth defects) PRUV. Anomalies that have been associated with PRUV include cardiovascular, nervous, urinary, skeleton and respiratory anomalies. No other anomalies visualized, but she is increased risk for a heart defect for multiple reasons, so will get fetal ECHO.  She most likely falls into the 86.5% of patients that have isolated PRUV and is a normal variant. We discussed that ultrasound cannot rule out all anomalies.      POLYHYDRAMNIOS  Passed 3 hour.  Mild and stable.      URINARY URGENCY  Sent UA    Summary of Plan  -Serial growth ultrasounds every 4 weeks (MFM)   -Starting at 32 weeks: twice Weekly fetal  surveillance until delivery--we will do NSTs here on  and BPP's at Dr. Hollins's office on .  We will switch the NST for a growth every 4 weeks.    -Continue ursodiol and repeat bile acids/CMP TWICE WEEKLY--may need to proceed with delivery if labs worsening.    -Continue ASA and Lovenox--switch to heparin at 34 weeks (heparin 10,000 u BID)  -Continue plaquenil and following with rheum  -Delivery 34-36 weeks for APLAS, anticoagulation, lupus, cholestasis--36 weeks right now but low threshold after 34 if changes occur.    Follow-up: No follow up with M scheduled, but I am happy to see for follow up at request of primary obstetrician - Scheduled for IOL 24    Thank you for the consult and opportunity to care for this patient.  Please feel free to reach out with any questions or concerns.      I spent 20 minutes caring  for this patient on this date of service. This time includes time spent by me in the following activities: preparing for the visit, reviewing tests, obtaining and/or reviewing a separately obtained history, performing a medically appropriate examination and/or evaluation, counseling and educating the patient/family/caregiver and independently interpreting results and communicating that information with the patient/family/caregiver with greater than 50% spent in counseling and coordination of care.       ZACH Parsons  Maternal Fetal Medicine-[facility]  Office: [phone]  [email or web address]    Pt reports that she is doing well and denies vaginal bleeding, cramping, contractions or LOF at this time. Reports active fetal movement. Reviewed when to call OB office or present to L&D for evaluation with symptoms such as decreased fetal movement, vaginal bleeding, LOF or ctxs. Pt verbalized understanding. Denies HA, visual changes or epigastric pain. Denies any additional complaints at time of appointment. Next OB appointment scheduled for 07/19/2024.    Vitals:    07/16/24 1022   BP: 126/82   Pulse: 99   Temp: 98.7 °F (37.1 °C)   SpO2: 99%

## 2024-07-16 NOTE — LETTER
2024     Tammy Hollins MD  [address] [facility]  [address]  [place] [address]    Patient: Carina Galindo   YOB: 1995   Date of Visit: 2024       Dear Tammy Hollins MD    Carina Galindo was in my office today. Below is a copy of my note.    If you have questions, please do not hesitate to call me. I look forward to following Carina along with you.         Sincerely,        ZACH Stratton        CC: No Recipients    MATERNAL FETAL MEDICINE Consult Note    Dear Dr Tammy Hollins MD:    Thank you for your kind referral of Carina Galindo.  As you know, she is a 29 y.o.   at  34 3/7 weeks gestation (Estimated Date of Delivery: 24). This is a consult.      Her antepartum course is complicated by:  SLE  APLAS  Cholestasis  Mild polyhydramnios    Aneuploidy Screening: low risk    HPI: Today, she denies headache, blurry vision, RUQ pain. No vaginal bleeding, no contractions.     Review of History:  Past Medical History:   Diagnosis Date   • Antiphospholipid syndrome    • Anxiety    • exercise induced asthma    • GERD (gastroesophageal reflux disease)    • Iron deficiency anemia    • Knee meniscus pain, left    • Liver cyst     BX AND FOUND TO BE NEGATIVE   • Lupus    • OCD (obsessive compulsive disorder)    • Rheumatoid factor positive      Past Surgical History:   Procedure Laterality Date   • ADENOIDECTOMY     • CHOLECYSTECTOMY WITH INTRAOPERATIVE CHOLANGIOGRAM N/A 2016    Procedure: CHOLECYSTECTOMY LAPAROSCOPIC INTRAOPERATIVE CHOLANGIOGRAM;  Surgeon: Malathi Lozano MD;  Location: [facility] OR [facility] [place];  Service:    • COLONOSCOPY     • ENDOSCOPY  2013    Gastritis    • KNEE ARTHROSCOPY Left 3/28/2023    Procedure: Knee Arthroscopy with lateral release, meniscectomy and cyst excision;  Surgeon: Tapan Craig MD;  Location: [facility] OR [facility] [place];  Service: Orthopedics;  Laterality: Left;   • NASAL SEPTUM SURGERY     • SHOULDER SURGERY Right    •  TONSILLECTOMY      x 2 due to regrowth   • UPPER GASTROINTESTINAL ENDOSCOPY  2010   • WISDOM TOOTH EXTRACTION         Social History     Socioeconomic History   • Marital status:    • Number of children: 0   Tobacco Use   • Smoking status: Never     Passive exposure: Never   • Smokeless tobacco: Never   Vaping Use   • Vaping status: Never Used   Substance and Sexual Activity   • Alcohol use: No   • Drug use: Never   • Sexual activity: Yes     Partners: Male     Birth control/protection: None     Family History   Problem Relation Age of Onset   • Diabetes Father    • Gallbladder disease Mother 30   • Rheum arthritis Mother    • Hypertension Mother    • Endometriosis Mother    • Irritable bowel syndrome Mother    • Pancreatitis Mother    • Breast cancer Maternal Grandmother    • Heart disease Maternal Grandfather    • Breast cancer Maternal Aunt    • Congenital heart disease Cousin    • Malig Hyperthermia Neg Hx    • Deep vein thrombosis Neg Hx    • Pulmonary embolism Neg Hx       Allergies   Allergen Reactions   • Egg-Derived Products Swelling     Eyes swell shut, has some eggs cooked in food.   • Other Other (See Comments)     FOOD ALLERGIES: see list : peanuts and ranch dressing     • Shellfish-Derived Products Anaphylaxis   • Nitrofurantoin Unknown (See Comments) and Rash     Unsure    Unsure   • Peanut Butter Flavor [Flavoring Agent] Hives   • Ciprofloxacin Rash   • Erythromycin Rash     As a baby, no interaction since   • Ibuprofen Rash     Has tolerated low dose in the past   • Multihance [Gadobenate] Hives     Patient experienced one small hive on neck. Was instructed to be pre-medicated per radiologist prior to MRI's for future.    • Sulfa Antibiotics Rash   • Triamcinolone Rash      Current Outpatient Medications on File Prior to Visit   Medication Sig Dispense Refill   • acetaminophen (TYLENOL) 325 MG tablet Take 1 tablet by mouth Every 6 (Six) Hours As Needed.     • albuterol (VENTOLIN HFA) 108 (90  "Base) MCG/ACT inhaler Inhale 2 puffs Every 4 (Four) Hours As Needed for Wheezing. 18 g 1   • Alcohol Swabs 70 % pads Use 1 Swab Daily. 100 each 11   • aspirin (ASPIR) 81 MG EC tablet Take one tablet by mouth daily until 36 weeks gestation. 90 tablet 3   • cholestyramine (Questran) 4 g packet Take 1 packet by mouth Daily. 20 packet 2   • diphenhydrAMINE (BENADRYL) 25 mg capsule Take 1 capsule by mouth Every 6 (Six) Hours As Needed for Itching.     • doxylamine-pyridoxine ER (Bonjesta) 20-20 MG tablet controlled-release tablet Take 1 tablet by mouth 2 (Two) Times a Day. 60 tablet 0   • Enoxaparin Sodium (LOVENOX) 40 MG/0.4ML solution prefilled syringe syringe Inject 0.4 mL under the skin into the appropriate area as directed Daily. 12 mL 11   • EPINEPHrine (EPIPEN) 0.3 MG/0.3ML solution auto-injector injection      • ferrous sulfate 325 (65 FE) MG tablet Take 1 tablet by mouth Daily With Breakfast. 90 tablet 2   • Heparin Sodium, Porcine, (heparin, porcine,) 63697 UNIT/ML injection Inject 1 ml under the skin twice daily 60 mL 2   • hydroxychloroquine (PLAQUENIL) 200 MG tablet Take 1 tablet by mouth Daily.     • pantoprazole (Protonix) 40 MG EC tablet Take 1 tablet by mouth Daily. 90 tablet 2   • Polysaccharide Iron Complex (ProFe) 391.3 (180 Fe) MG capsule Take  by mouth.     • Prenatal Vit-Fe Fumarate-FA (prenatal vitamin 27-0.8) 27-0.8 MG tablet tablet Take  by mouth Daily.     • sertraline (ZOLOFT) 50 MG tablet      • Syringe/Needle, Disp, (BD Eclipse Syringe/Needle) 25G X 5/8\" 3 ML misc Use to inject heparin 84 each 0   • ursodiol (ACTIGALL) 500 MG tablet Take 1 tablet by mouth 3 (Three) Times a Day. 90 tablet 11     No current facility-administered medications on file prior to visit.        Past obstetric, gynecological, medical, surgical, family and social history reviewed.  Relevant lab work and imaging reviewed.    Review of systems  Constitutional:  denies fever, chills, malaise.   ENT/Mouth:  denies sore " throat, tinnitus  Eyes: denies vision changes/pain  CV:  denies chest pain  Respiratory:  denies cough/SOB  GI:  denies N/V, diarrhea, abdominal pain.    :   denies dysuria  Skin:  denies lesions or pruritus   Neuro:  denies weakness, focal neurologic symptoms    There were no vitals filed for this visit.        PHYSICAL EXAM   GENERAL: Not in acute distress, AAOx3, pleasant  CARDIO: regular rate and rhythm  PULM: symmetric chest rise, speaking in complete sentences without difficulty  NEURO: awake, alert and oriented to person, place, and time  ABDOMINAL: No fundal tenderness, no rebound or guarding, gravid  EXTREMITIES: no bilateral lower extremity edema/tenderness  SKIN: Warm, well-perfused    US:  Cephalic presentation.  Anterior placenta.   SHARI 26-28 cm, consistent with  mild stable polyhydramnios.   BPP .     ASSESSMENT/COUNSELIN y.o.   at  34 3/7 weeks gestation (Estimated Date of Delivery: 24).     -Pregnancy  [ X ] stable  [   ] improving [  ] worsening    There are no diagnoses linked to this encounter.        SLE  Previously counseled.  Patient doesn't have an official dx of SLE, but began having joint symptoms with swelling right before she got pregnant.  Her mother has a diagnosis of RA.  She has seen rheumatology and met some but not all criteria for Lupus.  However, she is APLAS positive.  Given her symptoms/constellation of findings, she likely has Lupus or a similar autoimmune issue with similar risk factors during pregnancy, so we will treat her like lupus.  She is on plaquenil and doing well on 200 a day.  Her flares involve her joints and she has never had cardiac, renal, or neuro manifestations.  Her baseline P/C is 116 and BL Cr is 0.61 prepregnancy.  Platelets 431 earlier this year.  She was negative for Ro/La antibodies earlier this year.        I recommend she continue following with her rheumatologist as they recommend and have complement levels and Anti-DS DNA levels  each trimester. These will be helpful for comparison if she develops a flare or concern for preeclampsia during this pregnancy.  She will need serial growth exams and twice weekly ANFS starting by 32 weeks between me and her primary OB.      PIH labs appropriate.     CHOLESTASIS  Previously counseled.    Her bile acid has been bouncing around but just bumped up again and now she is having itching.  Discussed that with severe cholestasis (>40), significant itching despite medication, rising liver enzymes, etc. In the late  period, consideration can be given to delivery with severe cholestasis if it appears to be worsening to try to prevent stillbirth.      Discussed if labs worse/ liver enzymes elevated, recommend delivery.  If not, would repeat labs on Friday.      Would recommend delivery 36 weeks right now and if continues to rise/pt has other symptoms/rising liver enzymes, would recommend delivery after 34 weeks.      APLAS with no personal history of VTE  I discussed with the patient her diagnosis of APLAS, which is based on lupus and her positive lab tests.  We reviewed the risks of pregnancy with APLAS including but not limited to miscarriage, stillbirth, fetal growth restriction, preeclampsia, indicated  birth, and venous thromboembolic event.    We discussed decreasing the risk of these complications with LMWH and baby aspirin throughout pregnancy and for 4-6 weeks post partum.  We also recommend serial growth ultrasound and ANFS twice weekly beyond 32 weeks.    PRUV  We discussed that the umbilical cord starts off with two umbilical arteries (right and left) and two umbilical veins (right and left). During the 6th week of gestation, the right umbilical vein is supposed to disappear and the left umbilical vein should be the only one remaining. In persistent right umbilical vein, the right vein stays open while the left vein disappears. The incidence of PRUV is 0.17%; of the babies that have  PRUV, 13.5% will have other anomalies which also means that 86.5% of babies with PRUV have an isolated (no other birth defects) PRUV. Anomalies that have been associated with PRUV include cardiovascular, nervous, urinary, skeleton and respiratory anomalies. No other anomalies visualized, but she is increased risk for a heart defect for multiple reasons, so will get fetal ECHO.  She most likely falls into the 86.5% of patients that have isolated PRUV and is a normal variant. We discussed that ultrasound cannot rule out all anomalies.      POLYHYDRAMNIOS  Passed 3 hour.  Mild and stable.      URINARY URGENCY  Sent UA    Summary of Plan  -Serial growth ultrasounds every 4 weeks (MFM)   -Starting at 32 weeks: twice Weekly fetal  surveillance until delivery--we will do NSTs here on  and BPP's at Dr. Hollins's office on .  We will switch the NST for a growth every 4 weeks.    -Continue ursodiol and repeat bile acids/CMP TWICE WEEKLY--may need to proceed with delivery if labs worsening.    -Continue ASA and Lovenox--switch to heparin at 34 weeks (heparin 10,000 u BID)  -Continue plaquenil and following with rheum  -Delivery 34-36 weeks for APLAS, anticoagulation, lupus, cholestasis--36 weeks right now but low threshold after 34 if changes occur.    Follow-up: No follow up with M scheduled, but I am happy to see for follow up at request of primary obstetrician - Scheduled for IOL 24      Thank you for the consult and opportunity to care for this patient.  Please feel free to reach out with any questions or concerns.      I spent 20 minutes caring for this patient on this date of service. This time includes time spent by me in the following activities: preparing for the visit, reviewing tests, obtaining and/or reviewing a separately obtained history, performing a medically appropriate examination and/or evaluation, counseling and educating the patient/family/caregiver and independently  interpreting results and communicating that information with the patient/family/caregiver with greater than 50% spent in counseling and coordination of care.       ZACH Parsons  Maternal Fetal Medicine-  Office: [phone]  [email or web address]    Pt reports that she is doing well and denies vaginal bleeding, cramping, contractions or LOF at this time. Reports active fetal movement. Reviewed when to call OB office or present to L&D for evaluation with symptoms such as decreased fetal movement, vaginal bleeding, LOF or ctxs. Pt verbalized understanding. Denies HA, visual changes or epigastric pain. Denies any additional complaints at time of appointment. Next OB appointment scheduled for 07/19/2024.    Vitals:    07/16/24 1022   BP: 126/82   Pulse: 99   Temp: 98.7 °F (37.1 °C)   SpO2: 99%

## 2024-07-16 NOTE — PROGRESS NOTES
MATERNAL FETAL MEDICINE Consult Note    Dear Dr Tammy Hollins MD:    Thank you for your kind referral of Carina Galindo.  As you know, she is a 29 y.o.   at  35 3/7 weeks gestation (Estimated Date of Delivery: 24). This is a consult.      Her antepartum course is complicated by:  SLE  APLAS  Cholestasis  Mild polyhydramnios  PRUV    Aneuploidy Screening: low risk    HPI: Today, she denies headache, blurry vision, RUQ pain. No vaginal bleeding, no contractions.     Review of History:  Past Medical History:   Diagnosis Date    Antiphospholipid syndrome     Anxiety     exercise induced asthma     GERD (gastroesophageal reflux disease)     Iron deficiency anemia     Knee meniscus pain, left     Liver cyst     BX AND FOUND TO BE NEGATIVE    Lupus     OCD (obsessive compulsive disorder)     Rheumatoid factor positive      Past Surgical History:   Procedure Laterality Date    ADENOIDECTOMY      CHOLECYSTECTOMY WITH INTRAOPERATIVE CHOLANGIOGRAM N/A 2016    Procedure: CHOLECYSTECTOMY LAPAROSCOPIC INTRAOPERATIVE CHOLANGIOGRAM;  Surgeon: Malathi Lozano MD;  Location: [facility] OR [facility];  Service:     COLONOSCOPY  2010    ENDOSCOPY  2013    Gastritis     KNEE ARTHROSCOPY Left 3/28/2023    Procedure: Knee Arthroscopy with lateral release, meniscectomy and cyst excision;  Surgeon: Tapan Craig MD;  Location: [facility] OR [facility];  Service: Orthopedics;  Laterality: Left;    NASAL SEPTUM SURGERY      SHOULDER SURGERY Right 2016    TONSILLECTOMY      x 2 due to regrowth    UPPER GASTROINTESTINAL ENDOSCOPY  2010    WISDOM TOOTH EXTRACTION       Social History     Socioeconomic History    Marital status:     Number of children: 0   Tobacco Use    Smoking status: Never     Passive exposure: Never    Smokeless tobacco: Never   Vaping Use    Vaping status: Never Used   Substance and Sexual Activity    Alcohol use: No    Drug use: Never    Sexual activity: Yes     Partners: Male     Birth  control/protection: None     Family History   Problem Relation Age of Onset    Diabetes Father     Gallbladder disease Mother 30    Rheum arthritis Mother     Hypertension Mother     Endometriosis Mother     Irritable bowel syndrome Mother     Pancreatitis Mother     Breast cancer Maternal Grandmother     Heart disease Maternal Grandfather     Breast cancer Maternal Aunt     Congenital heart disease Cousin     Malig Hyperthermia Neg Hx     Deep vein thrombosis Neg Hx     Pulmonary embolism Neg Hx       Allergies   Allergen Reactions    Egg-Derived Products Swelling     Eyes swell shut, has some eggs cooked in food.    Other Other (See Comments)     FOOD ALLERGIES: see list : peanuts and ranch dressing      Shellfish-Derived Products Anaphylaxis    Nitrofurantoin Unknown (See Comments) and Rash     Unsure    Unsure    Peanut Butter Flavor [Flavoring Agent] Hives    Ciprofloxacin Rash    Erythromycin Rash     As a baby, no interaction since    Ibuprofen Rash     Has tolerated low dose in the past    Multihance [Gadobenate] Hives     Patient experienced one small hive on neck. Was instructed to be pre-medicated per radiologist prior to MRI's for future.     Sulfa Antibiotics Rash    Triamcinolone Rash      Current Outpatient Medications on File Prior to Visit   Medication Sig Dispense Refill    acetaminophen (TYLENOL) 325 MG tablet Take 1 tablet by mouth Every 6 (Six) Hours As Needed.      albuterol (VENTOLIN HFA) 108 (90 Base) MCG/ACT inhaler Inhale 2 puffs Every 4 (Four) Hours As Needed for Wheezing. 18 g 1    Alcohol Swabs 70 % pads Use 1 Swab Daily. 100 each 11    aspirin (ASPIR) 81 MG EC tablet Take one tablet by mouth daily until 36 weeks gestation. 90 tablet 3    cholestyramine (Questran) 4 g packet Take 1 packet by mouth Daily. 20 packet 2    diphenhydrAMINE (BENADRYL) 25 mg capsule Take 1 capsule by mouth Every 6 (Six) Hours As Needed for Itching.      doxylamine-pyridoxine ER (Bonjesta) 20-20 MG tablet  "controlled-release tablet Take 1 tablet by mouth 2 (Two) Times a Day. 60 tablet 0    Enoxaparin Sodium (LOVENOX) 40 MG/0.4ML solution prefilled syringe syringe Inject 0.4 mL under the skin into the appropriate area as directed Daily. 12 mL 11    EPINEPHrine (EPIPEN) 0.3 MG/0.3ML solution auto-injector injection       ferrous sulfate 325 (65 FE) MG tablet Take 1 tablet by mouth Daily With Breakfast. 90 tablet 2    Heparin Sodium, Porcine, (heparin, porcine,) 63887 UNIT/ML injection Inject 1 ml under the skin twice daily 60 mL 2    hydroxychloroquine (PLAQUENIL) 200 MG tablet Take 1 tablet by mouth Daily.      pantoprazole (Protonix) 40 MG EC tablet Take 1 tablet by mouth Daily. 90 tablet 2    Polysaccharide Iron Complex (ProFe) 391.3 (180 Fe) MG capsule Take  by mouth.      Prenatal Vit-Fe Fumarate-FA (prenatal vitamin 27-0.8) 27-0.8 MG tablet tablet Take  by mouth Daily.      sertraline (ZOLOFT) 50 MG tablet       Syringe/Needle, Disp, (BD Eclipse Syringe/Needle) 25G X 5/8\" 3 ML misc Use to inject heparin 84 each 0    ursodiol (ACTIGALL) 500 MG tablet Take 1 tablet by mouth 3 (Three) Times a Day. 90 tablet 11     No current facility-administered medications on file prior to visit.        Past obstetric, gynecological, medical, surgical, family and social history reviewed.  Relevant lab work and imaging reviewed.    Review of systems  Constitutional:  denies fever, chills, malaise.   ENT/Mouth:  denies sore throat, tinnitus  Eyes: denies vision changes/pain  CV:  denies chest pain  Respiratory:  denies cough/SOB  GI:  denies N/V, diarrhea, abdominal pain.    :   denies dysuria  Skin:  denies lesions or pruritus   Neuro:  denies weakness, focal neurologic symptoms    Vitals:    07/16/24 1022   BP: 126/82   BP Location: Right arm   Patient Position: Sitting   Pulse: 99   Temp: 98.7 °F (37.1 °C)   TempSrc: Temporal   SpO2: 99%   Weight: 106 kg (234 lb)   Height: 162.6 cm (64\")     PHYSICAL EXAM   GENERAL: Not in acute " distress, AAOx3, pleasant  CARDIO: regular rate and rhythm  PULM: symmetric chest rise, speaking in complete sentences without difficulty  NEURO: awake, alert and oriented to person, place, and time  ABDOMINAL: No fundal tenderness, no rebound or guarding, gravid  EXTREMITIES: no bilateral lower extremity edema/tenderness  SKIN: Warm, well-perfused    ULTRASOUND  Please view full report in [facility].      ASSESSMENT/COUNSELIN y.o.   at  35 3/7 weeks gestation (Estimated Date of Delivery: 24).     -Pregnancy  [ X ] stable  [   ] improving [  ] worsening    Diagnoses and all orders for this visit:    1. Intrahepatic cholestasis of pregnancy in third trimester (Primary)    2. Systemic lupus erythematosus- + anticardio AB, neg SS-A/SS-B    3. Polyhydramnios affecting pregnancy        SLE  Previously counseled.  Patient doesn't have an official dx of SLE, but began having joint symptoms with swelling right before she got pregnant.  Her mother has a diagnosis of RA.  She has seen rheumatology and met some but not all criteria for Lupus.  However, she is APLAS positive.  Given her symptoms/constellation of findings, she likely has Lupus or a similar autoimmune issue with similar risk factors during pregnancy, so we will treat her like lupus.  She is on plaquenil and doing well on 200 a day.  Her flares involve her joints and she has never had cardiac, renal, or neuro manifestations.  Her baseline P/C is 116 and BL Cr is 0.61 prepregnancy.  Platelets 431 earlier this year.  She was negative for Ro/La antibodies earlier this year.        I recommend she continue following with her rheumatologist as they recommend and have complement levels and Anti-DS DNA levels each trimester. These will be helpful for comparison if she develops a flare or concern for preeclampsia during this pregnancy.  She will need serial growth exams and twice weekly ANFS starting by 32 weeks between me and her primary OB.      PIH  labs appropriate.     CHOLESTASIS  Previously counseled.    Her bile acid has been bouncing around but just bumped up again and now she is having itching.  Discussed that with severe cholestasis (>40), significant itching despite medication, rising liver enzymes, etc. In the late  period, consideration can be given to delivery with severe cholestasis if it appears to be worsening to try to prevent stillbirth.      Discussed if labs worse/ liver enzymes elevated, recommend delivery.  If not, would repeat labs on Friday.      Would recommend delivery 36 weeks right now and if continues to rise/pt has other symptoms/rising liver enzymes, would recommend delivery after 34 weeks.      APLAS WITH NO PERSONAL HISTORY OF VTE  Previously counseled.  I discussed with the patient her diagnosis of APLAS, which is based on lupus and her positive lab tests.  We reviewed the risks of pregnancy with APLAS including but not limited to miscarriage, stillbirth, fetal growth restriction, preeclampsia, indicated  birth, and venous thromboembolic event.    We discussed decreasing the risk of these complications with LMWH and baby aspirin throughout pregnancy and for 4-6 weeks post partum.  We also recommend serial growth ultrasound and ANFS twice weekly beyond 32 weeks.    PRUV  Previously Counseled.   We discussed that the umbilical cord starts off with two umbilical arteries (right and left) and two umbilical veins (right and left). During the 6th week of gestation, the right umbilical vein is supposed to disappear and the left umbilical vein should be the only one remaining. In persistent right umbilical vein, the right vein stays open while the left vein disappears. The incidence of PRUV is 0.17%; of the babies that have PRUV, 13.5% will have other anomalies which also means that 86.5% of babies with PRUV have an isolated (no other birth defects) PRUV. Anomalies that have been associated with PRUV include  cardiovascular, nervous, urinary, skeleton and respiratory anomalies. No other anomalies visualized, but she is increased risk for a heart defect for multiple reasons, so will get fetal ECHO.  She most likely falls into the 86.5% of patients that have isolated PRUV and is a normal variant. We discussed that ultrasound cannot rule out all anomalies.      POLYHYDRAMNIOS  Passed 3 hour.  Mild and stable.      URINARY URGENCY  Sent UA    Summary of Plan  -Serial growth ultrasounds every 4 weeks (MFM)   -Starting at 32 weeks: twice Weekly fetal  surveillance until delivery--we will do NSTs here on  and BPP's at Dr. Hollins's office on .  We will switch the NST for a growth every 4 weeks.    -Continue ursodiol and repeat bile acids/CMP TWICE WEEKLY--may need to proceed with delivery if labs worsening.    -Continue ASA and Lovenox--switch to heparin at 34 weeks (heparin 10,000 u BID)  -Continue plaquenil and following with rheum  -Delivery 34-36 weeks for APLAS, anticoagulation, lupus, cholestasis--36 weeks right now but low threshold after 34 if changes occur.    Follow-up: No follow up with MFM scheduled, but I am happy to see for follow up at request of primary obstetrician - Scheduled for IOL 24    Thank you for the consult and opportunity to care for this patient.  Please feel free to reach out with any questions or concerns.      I spent 20 minutes caring for this patient on this date of service. This time includes time spent by me in the following activities: preparing for the visit, reviewing tests, obtaining and/or reviewing a separately obtained history, performing a medically appropriate examination and/or evaluation, counseling and educating the patient/family/caregiver and independently interpreting results and communicating that information with the patient/family/caregiver with greater than 50% spent in counseling and coordination of care.       Kenisha Harrell, ZACH  Maternal Fetal  [facility]  Office: [phone]  [email or web address]

## 2024-07-16 NOTE — PROGRESS NOTES
Pt reports that she is doing well and denies vaginal bleeding, cramping, contractions or LOF at this time. Reports active fetal movement. Reviewed when to call OB office or present to L&D for evaluation with symptoms such as decreased fetal movement, vaginal bleeding, LOF or ctxs. Pt verbalized understanding. Denies HA, visual changes or epigastric pain. Denies any additional complaints at time of appointment. Next OB appointment scheduled for 07/19/2024.    Vitals:    07/16/24 1022   BP: 126/82   Pulse: 99   Temp: 98.7 °F (37.1 °C)   SpO2: 99%

## 2024-07-17 ENCOUNTER — TELEPHONE (OUTPATIENT)
Dept: OBSTETRICS AND GYNECOLOGY | Facility: CLINIC | Age: 29
End: 2024-07-17
Payer: COMMERCIAL

## 2024-07-17 NOTE — TELEPHONE ENCOUNTER
Call placed to Carina after receiving message she wanted to go over most recent lab results. Dr. Patino reviewed lab results from yesterday, 7/16.     Carina notified bile acids are 44. Encouraged Carina to keep OB appointment and continue with plan for induction at 36 weeks- currently scheduled for induction on 7/20. Pt notified if she has an increase in itching, headaches, or a change in fetal movement, to call OB and head to labor and delivery for evaluation. Pt has no further questions at this time.

## 2024-07-19 ENCOUNTER — ROUTINE PRENATAL (OUTPATIENT)
Dept: OBSTETRICS AND GYNECOLOGY | Age: 29
End: 2024-07-19
Payer: COMMERCIAL

## 2024-07-19 VITALS — SYSTOLIC BLOOD PRESSURE: 122 MMHG | DIASTOLIC BLOOD PRESSURE: 78 MMHG | BODY MASS INDEX: 40.51 KG/M2 | WEIGHT: 236 LBS

## 2024-07-19 DIAGNOSIS — O99.891 SYSTEMIC LUPUS ERYTHEMATOSUS, MATERNAL, ANTEPARTUM: ICD-10-CM

## 2024-07-19 DIAGNOSIS — O99.210 OBESITY IN PREGNANCY, ANTEPARTUM: ICD-10-CM

## 2024-07-19 DIAGNOSIS — O99.810 ABNORMAL GLUCOSE TOLERANCE TEST (GTT) DURING PREGNANCY, ANTEPARTUM: ICD-10-CM

## 2024-07-19 DIAGNOSIS — O26.643 INTRAHEPATIC CHOLESTASIS OF PREGNANCY IN THIRD TRIMESTER: ICD-10-CM

## 2024-07-19 DIAGNOSIS — M32.9 SYSTEMIC LUPUS ERYTHEMATOSUS, MATERNAL, ANTEPARTUM: ICD-10-CM

## 2024-07-19 DIAGNOSIS — D68.61 ANTIPHOSPHOLIPID ANTIBODY SYNDROME COMPLICATING PREGNANCY: ICD-10-CM

## 2024-07-19 DIAGNOSIS — O99.119 ANTIPHOSPHOLIPID ANTIBODY SYNDROME COMPLICATING PREGNANCY: ICD-10-CM

## 2024-07-19 DIAGNOSIS — Z3A.35 35 WEEKS GESTATION OF PREGNANCY: ICD-10-CM

## 2024-07-19 DIAGNOSIS — O99.019 MATERNAL ANEMIA IN PREGNANCY, ANTEPARTUM: ICD-10-CM

## 2024-07-19 DIAGNOSIS — F41.9 ANXIETY: ICD-10-CM

## 2024-07-19 DIAGNOSIS — Z34.03 ENCOUNTER FOR PRENATAL CARE IN THIRD TRIMESTER OF FIRST PREGNANCY: Primary | ICD-10-CM

## 2024-07-19 LAB
GLUCOSE UR STRIP-MCNC: NEGATIVE MG/DL
PROT UR STRIP-MCNC: NEGATIVE MG/DL

## 2024-07-19 NOTE — PROGRESS NOTES
Chief Complaint   Patient presents with    Routine Prenatal Visit     Pt had US today, No complaints today       HPI: 29 y.o.  at 35w6d     Doing well  Reports good FM  Denies LOF, bleeding or ctx's  Pt of Dr. Hollins    Vitals:    24 1610   BP: 122/78   Weight: 107 kg (236 lb)       ROS:  GI:  Negative  : Negative  Pulmonary: Negative     A/P  1. Intrauterine pregnancy at 35w6d   2. Pregnancy Risk:  HIGH RISK    Diagnoses and all orders for this visit:    1. Encounter for prenatal care in third trimester of first pregnancy (Primary)    2. 35 weeks gestation of pregnancy  -     POC Urinalysis Dipstick    3. Antiphospholipid antibody syndrome complicating pregnancy- cardiolipin AB +    4. Abnormal glucose tolerance test (GTT) during pregnancy, antepartum    5. Intrahepatic cholestasis of pregnancy in third trimester    6. Systemic lupus erythematosus- + anticardio AB, neg SS-A/SS-B    7. Maternal anemia in pregnancy, antepartum    8. Obesity in pregnancy, antepartum    9. Anxiety        -----------------------  PLAN:   BPP   SHARI 32 cm  Induction at midnight  No follow-ups on file.      Holli Bates, APRN  2024 16:13 EDT

## 2024-07-20 ENCOUNTER — HOSPITAL ENCOUNTER (OUTPATIENT)
Dept: LABOR AND DELIVERY | Facility: HOSPITAL | Age: 29
Discharge: HOME | End: 2024-07-20
Payer: COMMERCIAL

## 2024-07-20 ENCOUNTER — ANESTHESIA EVENT (OUTPATIENT)
Dept: LABOR AND DELIVERY | Facility: HOSPITAL | Age: 29
End: 2024-07-20
Payer: COMMERCIAL

## 2024-07-20 ENCOUNTER — HOSPITAL ENCOUNTER (INPATIENT)
Facility: HOSPITAL | Age: 29
LOS: 3 days | Discharge: HOME | End: 2024-07-23
Attending: OBSTETRICS & GYNECOLOGY | Admitting: OBSTETRICS & GYNECOLOGY
Payer: COMMERCIAL

## 2024-07-20 ENCOUNTER — ANESTHESIA (OUTPATIENT)
Dept: LABOR AND DELIVERY | Facility: HOSPITAL | Age: 29
End: 2024-07-20
Payer: COMMERCIAL

## 2024-07-20 DIAGNOSIS — O99.891 SYSTEMIC LUPUS ERYTHEMATOSUS, MATERNAL, ANTEPARTUM: Primary | ICD-10-CM

## 2024-07-20 DIAGNOSIS — M32.9 SYSTEMIC LUPUS ERYTHEMATOSUS, MATERNAL, ANTEPARTUM: Primary | ICD-10-CM

## 2024-07-20 DIAGNOSIS — O26.643 CHOLESTASIS OF PREGNANCY IN THIRD TRIMESTER: ICD-10-CM

## 2024-07-20 LAB
ABO GROUP BLD: NORMAL
ALBUMIN SERPL-MCNC: 3.1 G/DL (ref 3.5–5.2)
ALBUMIN/GLOB SERPL: 0.9 G/DL
ALP SERPL-CCNC: 243 U/L (ref 39–117)
ALT SERPL W P-5'-P-CCNC: 24 U/L (ref 1–33)
ANION GAP SERPL CALCULATED.3IONS-SCNC: 11.1 MMOL/L (ref 5–15)
APTT PPP: 33.3 SECONDS (ref 22.7–35.4)
AST SERPL-CCNC: 29 U/L (ref 1–32)
BASOPHILS # BLD AUTO: 0.1 10*3/MM3 (ref 0–0.2)
BASOPHILS NFR BLD AUTO: 0.6 % (ref 0–1.5)
BILIRUB SERPL-MCNC: 0.2 MG/DL (ref 0–1.2)
BLD GP AB SCN SERPL QL: NEGATIVE
BUN SERPL-MCNC: 4 MG/DL (ref 6–20)
BUN/CREAT SERPL: 9.5 (ref 7–25)
CALCIUM SPEC-SCNC: 8.8 MG/DL (ref 8.6–10.5)
CHLORIDE SERPL-SCNC: 108 MMOL/L (ref 98–107)
CO2 SERPL-SCNC: 17.9 MMOL/L (ref 22–29)
CREAT SERPL-MCNC: 0.42 MG/DL (ref 0.57–1)
DEPRECATED RDW RBC AUTO: 36.1 FL (ref 37–54)
EGFRCR SERPLBLD CKD-EPI 2021: 134.3 ML/MIN/1.73
EOSINOPHIL # BLD AUTO: 0.25 10*3/MM3 (ref 0–0.4)
EOSINOPHIL NFR BLD AUTO: 1.5 % (ref 0.3–6.2)
ERYTHROCYTE [DISTWIDTH] IN BLOOD BY AUTOMATED COUNT: 11.9 % (ref 12.3–15.4)
GLOBULIN UR ELPH-MCNC: 3.4 GM/DL
GLUCOSE SERPL-MCNC: 82 MG/DL (ref 65–99)
HCT VFR BLD AUTO: 33.7 % (ref 34–46.6)
HGB BLD-MCNC: 11.5 G/DL (ref 12–15.9)
IMM GRANULOCYTES # BLD AUTO: 0.78 10*3/MM3 (ref 0–0.05)
IMM GRANULOCYTES NFR BLD AUTO: 4.7 % (ref 0–0.5)
INR PPP: 1.03 (ref 0.9–1.1)
LYMPHOCYTES # BLD AUTO: 2.53 10*3/MM3 (ref 0.7–3.1)
LYMPHOCYTES NFR BLD AUTO: 15.3 % (ref 19.6–45.3)
MCH RBC QN AUTO: 28.9 PG (ref 26.6–33)
MCHC RBC AUTO-ENTMCNC: 34.1 G/DL (ref 31.5–35.7)
MCV RBC AUTO: 84.7 FL (ref 79–97)
MONOCYTES # BLD AUTO: 0.98 10*3/MM3 (ref 0.1–0.9)
MONOCYTES NFR BLD AUTO: 5.9 % (ref 5–12)
NEUTROPHILS NFR BLD AUTO: 11.9 10*3/MM3 (ref 1.7–7)
NEUTROPHILS NFR BLD AUTO: 72 % (ref 42.7–76)
NRBC BLD AUTO-RTO: 0 /100 WBC (ref 0–0.2)
PLATELET # BLD AUTO: 301 10*3/MM3 (ref 140–450)
PMV BLD AUTO: 10 FL (ref 6–12)
POTASSIUM SERPL-SCNC: 3.5 MMOL/L (ref 3.5–5.2)
PROT SERPL-MCNC: 6.5 G/DL (ref 6–8.5)
PROTHROMBIN TIME: 13.7 SECONDS (ref 11.7–14.2)
RBC # BLD AUTO: 3.98 10*6/MM3 (ref 3.77–5.28)
RH BLD: POSITIVE
SODIUM SERPL-SCNC: 137 MMOL/L (ref 136–145)
T&S EXPIRATION DATE: NORMAL
TREPONEMA PALLIDUM IGG+IGM AB [PRESENCE] IN SERUM OR PLASMA BY IMMUNOASSAY: NORMAL
WBC NRBC COR # BLD AUTO: 16.54 10*3/MM3 (ref 3.4–10.8)

## 2024-07-20 PROCEDURE — 85025 COMPLETE CBC W/AUTO DIFF WBC: CPT | Performed by: OBSTETRICS & GYNECOLOGY

## 2024-07-20 PROCEDURE — 85610 PROTHROMBIN TIME: CPT | Performed by: OBSTETRICS & GYNECOLOGY

## 2024-07-20 PROCEDURE — 3E033VJ INTRODUCTION OF OTHER HORMONE INTO PERIPHERAL VEIN, PERCUTANEOUS APPROACH: ICD-10-PCS | Performed by: OBSTETRICS & GYNECOLOGY

## 2024-07-20 PROCEDURE — 25810000003 LACTATED RINGERS PER 1000 ML: Performed by: OBSTETRICS & GYNECOLOGY

## 2024-07-20 PROCEDURE — 63710000001 DIPHENHYDRAMINE PER 50 MG: Performed by: OBSTETRICS & GYNECOLOGY

## 2024-07-20 PROCEDURE — 3E0DXGC INTRODUCTION OF OTHER THERAPEUTIC SUBSTANCE INTO MOUTH AND PHARYNX, EXTERNAL APPROACH: ICD-10-PCS | Performed by: OBSTETRICS & GYNECOLOGY

## 2024-07-20 PROCEDURE — 86780 TREPONEMA PALLIDUM: CPT | Performed by: OBSTETRICS & GYNECOLOGY

## 2024-07-20 PROCEDURE — 25010000002 ROPIVACAINE PER 1 MG: Performed by: STUDENT IN AN ORGANIZED HEALTH CARE EDUCATION/TRAINING PROGRAM

## 2024-07-20 PROCEDURE — C1755 CATHETER, INTRASPINAL: HCPCS | Performed by: ANESTHESIOLOGY

## 2024-07-20 PROCEDURE — 80053 COMPREHEN METABOLIC PANEL: CPT | Performed by: OBSTETRICS & GYNECOLOGY

## 2024-07-20 PROCEDURE — 25010000002 ONDANSETRON PER 1 MG: Performed by: OBSTETRICS & GYNECOLOGY

## 2024-07-20 PROCEDURE — 10907ZC DRAINAGE OF AMNIOTIC FLUID, THERAPEUTIC FROM PRODUCTS OF CONCEPTION, VIA NATURAL OR ARTIFICIAL OPENING: ICD-10-PCS | Performed by: OBSTETRICS & GYNECOLOGY

## 2024-07-20 PROCEDURE — 86850 RBC ANTIBODY SCREEN: CPT | Performed by: OBSTETRICS & GYNECOLOGY

## 2024-07-20 PROCEDURE — 86900 BLOOD TYPING SEROLOGIC ABO: CPT | Performed by: OBSTETRICS & GYNECOLOGY

## 2024-07-20 PROCEDURE — 86901 BLOOD TYPING SEROLOGIC RH(D): CPT | Performed by: OBSTETRICS & GYNECOLOGY

## 2024-07-20 PROCEDURE — 85730 THROMBOPLASTIN TIME PARTIAL: CPT | Performed by: OBSTETRICS & GYNECOLOGY

## 2024-07-20 RX ORDER — ONDANSETRON 2 MG/ML
4 INJECTION INTRAMUSCULAR; INTRAVENOUS EVERY 6 HOURS PRN
Status: DISCONTINUED | OUTPATIENT
Start: 2024-07-20 | End: 2024-07-21 | Stop reason: HOSPADM

## 2024-07-20 RX ORDER — FENTANYL/ROPIVACAINE/NS/PF 2MCG/ML-.2
10 PLASTIC BAG, INJECTION (ML) INJECTION CONTINUOUS
Status: DISCONTINUED | OUTPATIENT
Start: 2024-07-20 | End: 2024-07-21

## 2024-07-20 RX ORDER — ROPIVACAINE HYDROCHLORIDE 2 MG/ML
INJECTION, SOLUTION EPIDURAL; INFILTRATION; PERINEURAL AS NEEDED
Status: DISCONTINUED | OUTPATIENT
Start: 2024-07-20 | End: 2024-07-21 | Stop reason: SURG

## 2024-07-20 RX ORDER — ONDANSETRON 2 MG/ML
4 INJECTION INTRAMUSCULAR; INTRAVENOUS ONCE AS NEEDED
Status: COMPLETED | OUTPATIENT
Start: 2024-07-20 | End: 2024-07-21

## 2024-07-20 RX ORDER — MISOPROSTOL 100 MCG
25 TABLET ORAL
Status: DISPENSED | OUTPATIENT
Start: 2024-07-20 | End: 2024-07-20

## 2024-07-20 RX ORDER — LIDOCAINE HYDROCHLORIDE 10 MG/ML
0.5 INJECTION, SOLUTION INFILTRATION; PERINEURAL ONCE AS NEEDED
Status: DISCONTINUED | OUTPATIENT
Start: 2024-07-20 | End: 2024-07-21 | Stop reason: HOSPADM

## 2024-07-20 RX ORDER — SODIUM CHLORIDE 0.9 % (FLUSH) 0.9 %
10 SYRINGE (ML) INJECTION EVERY 12 HOURS SCHEDULED
Status: DISCONTINUED | OUTPATIENT
Start: 2024-07-20 | End: 2024-07-21 | Stop reason: HOSPADM

## 2024-07-20 RX ORDER — SODIUM CHLORIDE, SODIUM LACTATE, POTASSIUM CHLORIDE, CALCIUM CHLORIDE 600; 310; 30; 20 MG/100ML; MG/100ML; MG/100ML; MG/100ML
125 INJECTION, SOLUTION INTRAVENOUS CONTINUOUS
Status: DISCONTINUED | OUTPATIENT
Start: 2024-07-20 | End: 2024-07-21

## 2024-07-20 RX ORDER — OXYTOCIN/0.9 % SODIUM CHLORIDE 30/500 ML
2-20 PLASTIC BAG, INJECTION (ML) INTRAVENOUS
Status: DISCONTINUED | OUTPATIENT
Start: 2024-07-20 | End: 2024-07-21

## 2024-07-20 RX ORDER — FAMOTIDINE 20 MG/1
20 TABLET, FILM COATED ORAL 2 TIMES DAILY PRN
Status: DISCONTINUED | OUTPATIENT
Start: 2024-07-20 | End: 2024-07-21 | Stop reason: HOSPADM

## 2024-07-20 RX ORDER — FAMOTIDINE 10 MG/ML
20 INJECTION, SOLUTION INTRAVENOUS ONCE AS NEEDED
Status: DISCONTINUED | OUTPATIENT
Start: 2024-07-20 | End: 2024-07-21 | Stop reason: HOSPADM

## 2024-07-20 RX ORDER — SODIUM CHLORIDE 0.9 % (FLUSH) 0.9 %
10 SYRINGE (ML) INJECTION AS NEEDED
Status: DISCONTINUED | OUTPATIENT
Start: 2024-07-20 | End: 2024-07-21 | Stop reason: HOSPADM

## 2024-07-20 RX ORDER — SODIUM CHLORIDE 9 MG/ML
40 INJECTION, SOLUTION INTRAVENOUS AS NEEDED
Status: DISCONTINUED | OUTPATIENT
Start: 2024-07-20 | End: 2024-07-21 | Stop reason: HOSPADM

## 2024-07-20 RX ORDER — ALBUTEROL SULFATE 90 UG/1
2 AEROSOL, METERED RESPIRATORY (INHALATION) EVERY 4 HOURS PRN
Status: DISCONTINUED | OUTPATIENT
Start: 2024-07-20 | End: 2024-07-21

## 2024-07-20 RX ORDER — ONDANSETRON 4 MG/1
4 TABLET, ORALLY DISINTEGRATING ORAL EVERY 6 HOURS PRN
Status: DISCONTINUED | OUTPATIENT
Start: 2024-07-20 | End: 2024-07-21 | Stop reason: HOSPADM

## 2024-07-20 RX ORDER — TERBUTALINE SULFATE 1 MG/ML
0.25 INJECTION, SOLUTION SUBCUTANEOUS AS NEEDED
Status: DISCONTINUED | OUTPATIENT
Start: 2024-07-20 | End: 2024-07-21 | Stop reason: HOSPADM

## 2024-07-20 RX ORDER — DIPHENHYDRAMINE HCL 25 MG
25 CAPSULE ORAL ONCE
Status: COMPLETED | OUTPATIENT
Start: 2024-07-20 | End: 2024-07-20

## 2024-07-20 RX ORDER — CETIRIZINE HYDROCHLORIDE 10 MG/1
10 TABLET ORAL DAILY
COMMUNITY

## 2024-07-20 RX ORDER — MAGNESIUM CARB/ALUMINUM HYDROX 105-160MG
30 TABLET,CHEWABLE ORAL ONCE AS NEEDED
Status: DISCONTINUED | OUTPATIENT
Start: 2024-07-20 | End: 2024-07-21 | Stop reason: HOSPADM

## 2024-07-20 RX ORDER — HYDROXYCHLOROQUINE SULFATE 200 MG/1
200 TABLET, FILM COATED ORAL NIGHTLY
Status: DISCONTINUED | OUTPATIENT
Start: 2024-07-21 | End: 2024-07-21

## 2024-07-20 RX ORDER — DIPHENHYDRAMINE HYDROCHLORIDE 50 MG/ML
12.5 INJECTION INTRAMUSCULAR; INTRAVENOUS EVERY 8 HOURS PRN
Status: DISCONTINUED | OUTPATIENT
Start: 2024-07-20 | End: 2024-07-21 | Stop reason: HOSPADM

## 2024-07-20 RX ORDER — ACETAMINOPHEN 325 MG/1
650 TABLET ORAL EVERY 4 HOURS PRN
Status: DISCONTINUED | OUTPATIENT
Start: 2024-07-20 | End: 2024-07-21 | Stop reason: HOSPADM

## 2024-07-20 RX ORDER — EPHEDRINE SULFATE 50 MG/ML
5 INJECTION, SOLUTION INTRAVENOUS
Status: DISCONTINUED | OUTPATIENT
Start: 2024-07-20 | End: 2024-07-21 | Stop reason: HOSPADM

## 2024-07-20 RX ORDER — FAMOTIDINE 10 MG/ML
20 INJECTION, SOLUTION INTRAVENOUS 2 TIMES DAILY PRN
Status: DISCONTINUED | OUTPATIENT
Start: 2024-07-20 | End: 2024-07-21 | Stop reason: HOSPADM

## 2024-07-20 RX ADMIN — Medication 2 MILLI-UNITS/MIN: at 13:27

## 2024-07-20 RX ADMIN — Medication 10 ML/HR: at 11:34

## 2024-07-20 RX ADMIN — SODIUM CHLORIDE, POTASSIUM CHLORIDE, SODIUM LACTATE AND CALCIUM CHLORIDE 125 ML/HR: 600; 310; 30; 20 INJECTION, SOLUTION INTRAVENOUS at 04:20

## 2024-07-20 RX ADMIN — SODIUM CHLORIDE, POTASSIUM CHLORIDE, SODIUM LACTATE AND CALCIUM CHLORIDE 999 ML/HR: 600; 310; 30; 20 INJECTION, SOLUTION INTRAVENOUS at 11:20

## 2024-07-20 RX ADMIN — ACETAMINOPHEN 325MG 650 MG: 325 TABLET ORAL at 19:30

## 2024-07-20 RX ADMIN — Medication 25 MCG: at 04:19

## 2024-07-20 RX ADMIN — Medication 10 ML/HR: at 19:44

## 2024-07-20 RX ADMIN — ACETAMINOPHEN 325MG 650 MG: 325 TABLET ORAL at 05:45

## 2024-07-20 RX ADMIN — DIPHENHYDRAMINE HYDROCHLORIDE 25 MG: 25 CAPSULE ORAL at 16:00

## 2024-07-20 RX ADMIN — ONDANSETRON 4 MG: 2 INJECTION INTRAMUSCULAR; INTRAVENOUS at 21:10

## 2024-07-20 RX ADMIN — ROPIVACAINE HYDROCHLORIDE 6 ML: 2 INJECTION, SOLUTION EPIDURAL; INFILTRATION at 11:34

## 2024-07-20 NOTE — PLAN OF CARE
Problem: Adult Inpatient Plan of Care  Goal: Plan of Care Review  Outcome: Adequate for Care Transition  Flowsheets (Taken 7/20/2024 1808)  Progress: improving  Plan of Care Reviewed With:   patient   spouse   family  Outcome Evaluation: IOL, 1 dose of cytotec at 0420, started on pitocin. AROM at 1742, will continue to monitor. Pt planning on vaginal delivery.  Goal: Patient-Specific Goal (Individualized)  Outcome: Adequate for Care Transition  Flowsheets (Taken 7/20/2024 1808)  Patient-Specific Goals (Include Timeframe): Vaginal delivery  Individualized Care Needs: None  Anxieties, Fears or Concerns: First baby.  Goal: Absence of Hospital-Acquired Illness or Injury  Outcome: Adequate for Care Transition  Intervention: Identify and Manage Fall Risk  Recent Flowsheet Documentation  Taken 7/20/2024 1600 by Doug Mckeon RN  Safety Promotion/Fall Prevention: safety round/check completed  Taken 7/20/2024 0703 by Doug Mckeon RN  Safety Promotion/Fall Prevention: safety round/check completed  Goal: Optimal Comfort and Wellbeing  Outcome: Adequate for Care Transition  Intervention: Provide Person-Centered Care  Recent Flowsheet Documentation  Taken 7/20/2024 1600 by Doug Mckeon RN  Trust Relationship/Rapport:   care explained   choices provided   emotional support provided   empathic listening provided   questions answered   questions encouraged   reassurance provided   thoughts/feelings acknowledged  Taken 7/20/2024 0703 by Doug Mckeon RN  Trust Relationship/Rapport:   care explained   choices provided   emotional support provided   empathic listening provided   questions answered   questions encouraged   reassurance provided   thoughts/feelings acknowledged  Goal: Readiness for Transition of Care  Outcome: Adequate for Care Transition     Problem: Bleeding (Labor)  Goal: Hemostasis  Outcome: Adequate for Care Transition     Problem: Change in Fetal Wellbeing (Labor)  Goal: Stable Fetal Wellbeing  Outcome:  Adequate for Care Transition     Problem: Delayed Labor Progression (Labor)  Goal: Effective Progression to Delivery  Outcome: Adequate for Care Transition     Problem: Infection (Labor)  Goal: Absence of Infection Signs and Symptoms  Outcome: Adequate for Care Transition     Problem: Labor Pain (Labor)  Goal: Acceptable Pain Control  Outcome: Adequate for Care Transition     Problem: Uterine Tachysystole (Labor)  Goal: Normal Uterine Contraction Pattern  Outcome: Adequate for Care Transition     Problem:  Fall Injury Risk  Goal: Absence of Fall, Infant Drop and Related Injury  Outcome: Adequate for Care Transition  Intervention: Promote Injury-Free Environment  Recent Flowsheet Documentation  Taken 2024 1600 by Doug Mckeon, RN  Safety Promotion/Fall Prevention: safety round/check completed  Taken 2024 0703 by Doug Mckeon, RN  Safety Promotion/Fall Prevention: safety round/check completed     Problem: Skin Injury Risk Increased  Goal: Skin Health and Integrity  Outcome: Adequate for Care Transition   Goal Outcome Evaluation:  Plan of Care Reviewed With: patient, spouse, family        Progress: improving  Outcome Evaluation: IOL, 1 dose of cytotec at 0420, started on pitocin. AROM at 1742, will continue to monitor. Pt planning on vaginal delivery.

## 2024-07-20 NOTE — H&P
[facility]  Obstetric History and Physical    Chief Complaint   Patient presents with    Scheduled Induction     Denies vaginal bleeding, contractions, or LOF       Subjective     Patient is a 29 y.o. female  currently at 36w0d, who presents for induction of labor secondary to severe cholestasis of pregnancy.   She does report mild contractions after receiving 1 dose of Cytotec.  Denies loss of fluid or vaginal bleeding and endorses good fetal movement.    Her prenatal care is complicated by antiphospholipid antibody syndrome, lupus with positive anticardiolipin antibody, cholestasis, obesity, and anemia.  Her previous obstetric/gynecological history is noted for is non-contributory.    The following portions of the patients history were reviewed and updated as appropriate: current medications, allergies, past medical history, past surgical history, past family history, past social history, and problem list .       Prenatal Information:  Prenatal Results       Initial Prenatal Labs       Test Value Reference Range Date Time    Hemoglobin  12.3 g/dL 12.0 - 15.9 24 1721       12.5 g/dL 11.1 - 15.9 24 1012    Hematocrit  36.4 % 34.0 - 46.6 24 1721       37.4 % 34.0 - 46.6 24 1012    Platelets  392 10*3/mm3 140 - 450 24 1721       431 x10E3/uL 150 - 450 24 1012    Rubella IgG  1.65 index Immune >0.99 24 1012    Hepatitis B SAg  Negative  Negative 24 1012    Hepatitis C Ab  Non Reactive  Non Reactive 24 1012    RPR  Non Reactive  Non Reactive 24        Non Reactive  Non Reactive 24 1012    T. Pallidum Ab   Non-Reactive  Non-Reactive 24 0338       Non-Reactive  Non-Reactive 24 1605    ABO  B   24 0338    Rh  Positive   24 0338    Antibody Screen  Negative  Negative 24 1012    HIV  Non Reactive  Non Reactive 24 1012    Urine Culture  <25,000 CFU/mL Normal Urogenital Elsie   24 1119       Final report    02/05/24 1230       Final report   01/09/24 1221    Gonorrhea  Negative  Negative 01/09/24 1221    Chlamydia  Negative  Negative 01/09/24 1221    TSH        HgB A1c   5.3 % 4.8 - 5.6 01/09/24 1012    Varicella IgG  709 index Immune >165 01/09/24 1012    Hemoglobinopathy Fractionation  Comment   01/09/24 1012    Hemoglobinopathy (genetic testing)        Cystic fibrosis                   Fetal testing        Test Value Reference Range Date Time    NIPT        MSAFP        AFP-4                  2nd and 3rd Trimester       Test Value Reference Range Date Time    Hemoglobin (repeated)  11.5 g/dL 12.0 - 15.9 07/20/24 0338       12.1 g/dL 12.0 - 15.9 07/12/24 1216       11.5 g/dL 12.0 - 15.9 06/28/24 1232       11.4 g/dL 12.0 - 15.9 06/21/24 1022       11.6 g/dL 12.0 - 15.9 06/17/24 1605       11.6 g/dL 11.1 - 15.9 05/31/24 0920       11.8 g/dL 11.1 - 15.9 05/17/24        11.8 g/dL 12.0 - 15.9 04/17/24 0944    Hematocrit (repeated)  33.7 % 34.0 - 46.6 07/20/24 0338       36.1 % 34.0 - 46.6 07/12/24 1216       33.9 % 34.0 - 46.6 06/28/24 1232       34.0 % 34.0 - 46.6 06/21/24 1022       34.2 % 34.0 - 46.6 06/17/24 1605       35.1 % 34.0 - 46.6 05/31/24 0920       34.6 % 34.0 - 46.6 05/17/24        34.4 % 34.0 - 46.6 04/17/24 0944    Platelets   301 10*3/mm3 140 - 450 07/20/24 0338       332 10*3/mm3 140 - 450 07/12/24 1216       323 10*3/mm3 140 - 450 06/28/24 1232       359 10*3/mm3 140 - 450 06/21/24 1022       306 10*3/mm3 140 - 450 06/17/24 1605       315 x10E3/uL 150 - 450 05/31/24 0920       314 x10E3/uL 150 - 450 05/17/24        315 10*3/mm3 140 - 450 04/17/24 0944       392 10*3/mm3 140 - 450 03/29/24 1721       431 x10E3/uL 150 - 450 01/09/24 1012    1 hour GTT   142 mg/dL 70 - 139 05/17/24     Antibody Screen (repeated)  Negative   07/20/24 0338       Negative   06/17/24 1605    3rd TM syphilis scrn (repeated)  RPR   Non Reactive  Non Reactive 05/17/24     3rd TM syphilis scrn (repeated) TP-Ab  Non-Reactive   Non-Reactive 07/20/24 0338       Non-Reactive  Non-Reactive 06/17/24 1605    3rd TM syphilis screen TB-Ab (FTA)        Syphilis cascade test TP-Ab (EIA)        Syphilis cascade TPPA        GTT Fasting  77 mg/dL 70 - 94 05/23/24     GTT 1 Hr  156 mg/dL 70 - 179 05/23/24     GTT 2 Hr  91 mg/dL 70 - 154 05/23/24     GTT 3 Hr  93 mg/dL 70 - 139 05/23/24     Group B Strep  Negative  Negative 07/12/24               Other testing        Test Value Reference Range Date Time    Parvo IgG         CMV IgG                   Drug Screening       Test Value Reference Range Date Time    Amphetamine Screen  Negative ng/mL Atcdbw=3324 01/09/24 1221    Barbiturate Screen  Negative ng/mL Moxhha=128 01/09/24 1221    Benzodiazepine Screen  Negative ng/mL Blzgau=828 01/09/24 1221    Methadone Screen  Negative ng/mL Cjohjn=349 01/09/24 1221    Phencyclidine Screen  Negative ng/mL Cutoff=25 01/09/24 1221    Opiates Screen  Negative ng/mL Jerpvw=693 01/09/24 1221    THC Screen  Negative ng/mL Cutoff=50 01/09/24 1221    Cocaine Screen  Negative ng/mL Djpcpz=266 01/09/24 1221    Propoxyphene Screen  Negative ng/mL Rycopo=739 01/09/24 1221    Buprenorphine Screen        Methamphetamine Screen        Oxycodone Screen        Tricyclic Antidepressants Screen                  Legend    ^: Historical                          External Prenatal Results       Pregnancy Outside Results - Transcribed From Office Records - See Scanned Records For Details       Test Value Date Time    ABO  B  07/20/24 0338    Rh  Positive  07/20/24 0338    Antibody Screen  Negative  07/20/24 0338       Negative  06/17/24 1605       Negative  01/09/24 1012    Varicella IgG  709 index 01/09/24 1012    Rubella  1.65 index 01/09/24 1012    Hgb  11.5 g/dL 07/20/24 0338       12.1 g/dL 07/12/24 1216       11.5 g/dL 06/28/24 1232       11.4 g/dL 06/21/24 1022       11.6 g/dL 06/17/24 1605       11.6 g/dL 05/31/24 0920       11.8 g/dL 05/17/24        11.8 g/dL 04/17/24 0944        12.3 g/dL 24 1721       12.5 g/dL 24 1012    Hct  33.7 % 24 0338       36.1 % 24 1216       33.9 % 24 1232       34.0 % 24 1022       34.2 % 24 1605       35.1 % 24 0920       34.6 % 24        34.4 % 24 0944       36.4 % 24 1721       37.4 % 24 1012    HgB A1c   5.3 % 24 1012    1h GTT  142 mg/dL 24     3h GTT Fasting  77 mg/dL 24     3h GTT 1 hour  156 mg/dL 24     3h GTT 2 hour  91 mg/dL 24     3h GTT 3 hour   93 mg/dL 24     Gonorrhea (discrete)  Negative  24 1221    Chlamydia (discrete)  Negative  24 1221    RPR  Non Reactive  24        Non Reactive  24 1012    Syphilis Antibody       HBsAg  Negative  24 1012    Herpes Simplex Virus PCR       Herpes Simplex VIrus Culture       HIV  Non Reactive  24 1012    Hep C RNA Quant PCR       Hep C Antibody  Non Reactive  24 1012    AFP       NIPT       Cystic Fibroisis        Group B Strep  Negative  24     GBS Susceptibility to Clindamycin       GBS Susceptibility to Erythromycin       Fetal Fibronectin       Genetic Testing, Maternal Blood                 Drug Screening       Test Value Date Time    Urine Drug Screen       Amphetamine Screen  Negative ng/mL 24 1221    Barbiturate Screen  Negative ng/mL 24 1221    Benzodiazepine Screen  Negative ng/mL 24 1221    Methadone Screen  Negative ng/mL 24 1221    Phencyclidine Screen  Negative ng/mL 24 1221    Opiates Screen       THC Screen       Cocaine Screen       Propoxyphene Screen  Negative ng/mL 24 1221    Buprenorphine Screen       Methamphetamine Screen       Oxycodone Screen       Tricyclic Antidepressants Screen                 Legend    ^: Historical                             Past OB History:     OB History    Para Term  AB Living   1 0 0 0 0 0   SAB IAB Ectopic Molar Multiple Live Births   0 0 0 0 0 0      #  Outcome Date GA Lbr Jah/2nd Weight Sex Type Anes PTL Lv   1 Current               Obstetric Comments   3/29/24 - 19-6 weeks - normal, completed anatomy, normal cl - ant placenta -J[facility]         Past Medical History: Past Medical History:   Diagnosis Date    Antiphospholipid syndrome     Anxiety     exercise induced asthma     GERD (gastroesophageal reflux disease) 2010    Iron deficiency anemia     Knee meniscus pain, left     Liver cyst     BX AND FOUND TO BE NEGATIVE    Lupus     OCD (obsessive compulsive disorder)     Rheumatoid factor positive       Past Surgical History Past Surgical History:   Procedure Laterality Date    ADENOIDECTOMY      CHOLECYSTECTOMY WITH INTRAOPERATIVE CHOLANGIOGRAM N/A 07/14/2016    Procedure: CHOLECYSTECTOMY LAPAROSCOPIC INTRAOPERATIVE CHOLANGIOGRAM;  Surgeon: Malathi Lozano MD;  Location: [facility] OR [facility];  Service:     COLONOSCOPY  2010    ENDOSCOPY  11/01/2013    Gastritis     KNEE ARTHROSCOPY Left 3/28/2023    Procedure: Knee Arthroscopy with lateral release, meniscectomy and cyst excision;  Surgeon: Tapan Craig MD;  Location: [facility] OR [facility];  Service: Orthopedics;  Laterality: Left;    NASAL SEPTUM SURGERY      SHOULDER SURGERY Right 2016    TONSILLECTOMY      x 2 due to regrowth    UPPER GASTROINTESTINAL ENDOSCOPY  2010    WISDOM TOOTH EXTRACTION        Family History: Family History   Problem Relation Age of Onset    Diabetes Father     Gallbladder disease Mother 30    Rheum arthritis Mother     Hypertension Mother     Endometriosis Mother     Irritable bowel syndrome Mother     Pancreatitis Mother     Breast cancer Maternal Grandmother     Heart disease Maternal Grandfather     Breast cancer Maternal Aunt     Congenital heart disease Cousin     Malig Hyperthermia Neg Hx     Deep vein thrombosis Neg Hx     Pulmonary embolism Neg Hx       Social History:  reports that she has never smoked. She has never been exposed to tobacco smoke. She has never used smokeless tobacco.    reports no history of alcohol use.   reports no history of drug use.        Review of Systems   Constitutional:  Negative for chills, fatigue and fever.   Gastrointestinal:  Negative for abdominal pain.   Genitourinary:  Negative for dysuria, vaginal bleeding, vaginal discharge and vaginal pain.         Objective     Vital Signs Range for the last 24 hours  Temperature: Temp:  [97.9 °F (36.6 °C)-98.2 °F (36.8 °C)] 97.9 °F (36.6 °C)   Temp Source: Temp src: Oral   BP: BP: (122-144)/(78-91) 125/83   Pulse: Heart Rate:  [72-83] 72   Respirations: Resp:  [16-18] 18   SPO2: SpO2:  [98 %] 98 %   O2 Amount (l/min):     O2 Devices Device (Oxygen Therapy): room air   Weight: Weight:  [107 kg (236 lb)-109 kg (240 lb 4.8 oz)] 109 kg (240 lb 4.8 oz)     Physical Examination: General appearance - alert, well appearing, and in no distress  Abdomen - gravid, soft, nontender, nondistended, no masses or organomegaly  Musculoskeletal - no joint tenderness, deformity or swelling  Extremities - peripheral pulses normal, no pedal edema, no clubbing or cyanosis  Skin - normal coloration and turgor, no rashes, no suspicious skin lesions noted    Presentation: Vertex    Cervix: Exam by:     Dilation: Cervical Dilation (cm): 2   Effacement: Cervical Effacement: 60%   Station: Fetal Station: 1-->-2     Fetal Heart Rate Assessment   Method: Fetal HR Assessment Method: external   Beats/min: Fetal HR (beats/min): 150   Baseline: Fetal HR Baseline: normal range   Variability: Fetal HR Variability: moderate (amplitude range 6 to 25 bpm)   Accels: Fetal HR Accelerations: greater than/equal to 15 bpm, lasting at least 15 seconds   Decels: Fetal HR Decelerations: absent   Tracing Category:       Uterine Assessment   Method: Method: external tocotransducer   Frequency (min): Contraction Frequency (Minutes): 1-2   Ctx Count in 10 min: Contractions in 10 Minutes: 2-3   Duration:     Intensity: Contraction Intensity: mild by palpation   Intensity by  IUPC:     Resting Tone: Uterine Resting Tone: soft by palpation   Resting Tone by IUPC:     Taina Units:       Laboratory Results:   Results from last 7 days   Lab Units 24  0338   WBC 10*3/mm3 16.54*   HEMOGLOBIN g/dL 11.5*   HEMATOCRIT % 33.7*   PLATELETS 10*3/mm3 301       Results from last 7 days   Lab Units 24  0338   SODIUM mmol/L 137   POTASSIUM mmol/L 3.5   CHLORIDE mmol/L 108*   CO2 mmol/L 17.9*   BUN mg/dL 4*   CREATININE mg/dL 0.42*   CALCIUM mg/dL 8.8   BILIRUBIN mg/dL 0.2   ALK PHOS U/L 243*   ALT (SGPT) U/L 24   AST (SGOT) U/L 29   GLUCOSE mg/dL 82         Assessment & Plan       Cholestasis during pregnancy in third trimester    Anxiety    Asthma    Antiphospholipid antibody syndrome complicating pregnancy- cardiolipin AB +    Systemic lupus erythematosus- + anticardio AB, neg SS-A/SS-B    Maternal anemia in pregnancy, antepartum    Obesity in pregnancy, antepartum      Assessment & Plan    Assessment:  1.  Intrauterine pregnancy at 36w0d gestation with reactive, reassuring fetal status.    2.  induction of labor  for severe cholestasis of pregnancy  with favorable cervix  3.  Obstetrical history significant for is non-contributory.  4.  GBS status:   Strep Gp B Culture   Date Value Ref Range Status   2024 Negative Negative Final     Comment:     Centers for Disease Control and Prevention (CDC) and American Congress  of Obstetricians and Gynecologists (ACOG) guidelines for prevention of   group B streptococcal (GBS) disease specify co-collection of  a vaginal and rectal swab specimen to maximize sensitivity of GBS  detection. Per the CDC and ACOG, swabbing both the lower vagina and  rectum substantially increases the yield of detection compared with  sampling the vagina alone.  Penicillin G, ampicillin, or cefazolin are indicated for intrapartum  prophylaxis of  GBS colonization. Reflex susceptibility  testing should be performed prior to use of clindamycin  only on GBS  isolates from penicillin-allergic women who are considered a high risk  for anaphylaxis. Treatment with vancomycin without additional testing  is warranted if resistance to clindamycin is noted.         Plan:  1. fetal and uterine monitoring  continuously, cervical ripening with  Misoprostol, labor augmentation  Pitocin, and analgesia with  epidural  2. Plan of care has been reviewed with patient and nurse.   3.  Risks, benefits of treatment plan have been discussed.  4.  All questions have been answered.        Tammy Hollins MD  7/20/2024  09:37 EDT

## 2024-07-20 NOTE — ANESTHESIA PROCEDURE NOTES
Labor Epidural      Patient reassessed immediately prior to procedure    Patient location during procedure: OB  Start Time: 7/20/2024 11:21 AM  Stop Time: 7/20/2024 11:33 AM  Performed By  Anesthesiologist: Primitivo Mayo MD  Preanesthetic Checklist  Completed: patient identified, risks and benefits discussed and timeout performed  Prep:  Pt Position:sitting  Sterile Tech:cap, gloves, mask and sterile barrier  Prep:chlorhexidine gluconate and isopropyl alcohol  Monitoring:blood pressure monitoring, continuous pulse oximetry and EKG  Epidural Block Procedure:  Approach:midline  Guidance:landmark technique and palpation technique  Location:L3-L4  Needle Type:Tuohy  Needle Gauge:17 G  Loss of Resistance Medium: saline  Loss of Resistance: 8cm  Cath Depth at skin:13 cm  Paresthesia: none  Aspiration:negative  Test Dose:negative  Number of Attempts: 1  Post Assessment:  Dressing:occlusive dressing applied and secured with tape  Pt Tolerance:patient tolerated the procedure well with no apparent complications  Complications:no

## 2024-07-20 NOTE — ANESTHESIA PREPROCEDURE EVALUATION
Anesthesia Evaluation     Patient summary reviewed and Nursing notes reviewed   no history of anesthetic complications:                Airway   Mallampati: II  TM distance: >3 FB  Neck ROM: full  Dental      Pulmonary    (+) asthma,  Cardiovascular         Neuro/Psych  GI/Hepatic/Renal/Endo    (+) morbid obesity, GERD    Musculoskeletal     Abdominal    Substance History      OB/GYN    (+) Pregnant        Other                    Anesthesia Plan    ASA 3     epidural     (36w0d)    Anesthetic plan, risks, benefits, and alternatives have been provided, discussed and informed consent has been obtained with: patient.    CODE STATUS:    Level Of Support Discussed With: Patient  Code Status (Patient has no pulse and is not breathing): CPR (Attempt to Resuscitate)  Medical Interventions (Patient has pulse or is breathing): Full Support       Health Maintenance Due   Topic Date Due   • Cervical Cancer Screening HPV CO-Testing  03/02/2020       Patient is due for the topics as listed above and wishes to proceed with them. Orders placed for Cervical cancer screening.        Recent PHQ 2/9 Score    PHQ 2:  Date Adult PHQ 2 Score Adult PHQ 2 Interpretation   11/19/2020 4 Further screening needed       PHQ 9:  Date Adult PHQ 9 Score Adult PHQ 9 Interpretation   11/19/2020 12 Moderate Depression                   Generalized Anxiety Disorder (GAD7)    Over the last 2 weeks, how often have you been bothered by the following problems?   Score: 20    Score:  0-4 minimal symptoms 10-14 moderate symptoms   5-9 mild symptoms 15-21 severe symptoms      1.  Feeling nervous, anxious or on edge Nearly every day   2.  Not being able to stop or control worrying Nearly every day   3.  Worrying too much about different things Nearly every day   4.  Trouble relaxing Nearly every day   5.  Being so restless that it's hard to sit still Nearly every day   6.  Becoming easily annoyed or irritable More than half the days   7.  Feeling afraid as if something awful might happen Nearly every day            If you checked off any problems, how difficult have these made it for you to do your work, take care of things at home, or get along with other people? Very difficult

## 2024-07-20 NOTE — PLAN OF CARE
Problem: Adult Inpatient Plan of Care  Goal: Plan of Care Review  Outcome: Ongoing, Progressing  Flowsheets (Taken 7/20/2024 0614)  Progress: improving  Plan of Care Reviewed With:   patient   spouse  Outcome Evaluation: Patient admitted for IOL. Pt denies LOF, vaginal bleeding, pain, or contractions. Pt educated on induction process. First dose of cytotec placed at 0420.  Goal: Patient-Specific Goal (Individualized)  Outcome: Ongoing, Progressing  Flowsheets (Taken 7/20/2024 0614)  Patient-Specific Goals (Include Timeframe): Patient will verbalize pain level less than 5/10 for duration of shift  Individualized Care Needs: Pain reassessment and education on labor process  Anxieties, Fears or Concerns: pain and bleeding  Goal: Absence of Hospital-Acquired Illness or Injury  Outcome: Ongoing, Progressing  Intervention: Identify and Manage Fall Risk  Recent Flowsheet Documentation  Taken 7/20/2024 0330 by Lucita Jaimes RN  Safety Promotion/Fall Prevention: safety round/check completed  Intervention: Prevent Skin Injury  Recent Flowsheet Documentation  Taken 7/20/2024 0330 by Lucita Jaimes RN  Skin Protection:   adhesive use limited   tubing/devices free from skin contact  Intervention: Prevent and Manage VTE (Venous Thromboembolism) Risk  Recent Flowsheet Documentation  Taken 7/20/2024 0330 by Lucita Jaimes RN  Range of Motion: active ROM (range of motion) encouraged  Goal: Optimal Comfort and Wellbeing  Outcome: Ongoing, Progressing  Intervention: Provide Person-Centered Care  Recent Flowsheet Documentation  Taken 7/20/2024 0330 by Lucita Jaimes RN  Trust Relationship/Rapport:   care explained   choices provided   emotional support provided   empathic listening provided   questions answered   questions encouraged   reassurance provided   thoughts/feelings acknowledged  Goal: Readiness for Transition of Care  Outcome: Ongoing, Progressing  Intervention: Mutually Develop Transition Plan  Recent  Flowsheet Documentation  Taken 2024 0315 by Lucita Jaimes, RN  Equipment Currently Used at Home: none  Taken 2024 0312 by Lucita Jaimes RN  Equipment Needed After Discharge: none  Equipment Currently Used at Home: none  Anticipated Changes Related to Illness: none  Transportation Anticipated: car, drives self  Concerns to be Addressed: no discharge needs identified  Patient/Family Anticipated Services at Transition: none  Patient/Family Anticipates Transition to:   home   home with family     Problem: Bleeding (Labor)  Goal: Hemostasis  Outcome: Ongoing, Progressing     Problem: Change in Fetal Wellbeing (Labor)  Goal: Stable Fetal Wellbeing  Outcome: Ongoing, Progressing     Problem: Delayed Labor Progression (Labor)  Goal: Effective Progression to Delivery  Outcome: Ongoing, Progressing     Problem: Infection (Labor)  Goal: Absence of Infection Signs and Symptoms  Outcome: Ongoing, Progressing     Problem: Labor Pain (Labor)  Goal: Acceptable Pain Control  Outcome: Ongoing, Progressing     Problem: Uterine Tachysystole (Labor)  Goal: Normal Uterine Contraction Pattern  Outcome: Ongoing, Progressing     Problem:  Fall Injury Risk  Goal: Absence of Fall, Infant Drop and Related Injury  Outcome: Ongoing, Progressing  Intervention: Promote Injury-Free Environment  Recent Flowsheet Documentation  Taken 2024 033 by Lucita Jaimes, RN  Safety Promotion/Fall Prevention: safety round/check completed   Goal Outcome Evaluation:  Plan of Care Reviewed With: patient, spouse        Progress: improving  Outcome Evaluation: Patient admitted for IOL. Pt denies LOF, vaginal bleeding, pain, or contractions. Pt educated on induction process. First dose of cytotec placed at 0420.

## 2024-07-21 PROCEDURE — 59400 OBSTETRICAL CARE: CPT | Performed by: OBSTETRICS & GYNECOLOGY

## 2024-07-21 PROCEDURE — 88307 TISSUE EXAM BY PATHOLOGIST: CPT

## 2024-07-21 PROCEDURE — S0260 H&P FOR SURGERY: HCPCS | Performed by: OBSTETRICS & GYNECOLOGY

## 2024-07-21 PROCEDURE — 25010000002 ONDANSETRON PER 1 MG: Performed by: OBSTETRICS & GYNECOLOGY

## 2024-07-21 PROCEDURE — 25010000002 ONDANSETRON PER 1 MG: Performed by: STUDENT IN AN ORGANIZED HEALTH CARE EDUCATION/TRAINING PROGRAM

## 2024-07-21 PROCEDURE — 25810000003 LACTATED RINGERS PER 1000 ML: Performed by: OBSTETRICS & GYNECOLOGY

## 2024-07-21 PROCEDURE — 25810000003 SODIUM CHLORIDE 0.9 % SOLUTION: Performed by: OBSTETRICS & GYNECOLOGY

## 2024-07-21 PROCEDURE — 25010000002 OXYTOCIN PER 10 UNITS: Performed by: OBSTETRICS & GYNECOLOGY

## 2024-07-21 PROCEDURE — 25010000002 DIPHENHYDRAMINE PER 50 MG: Performed by: STUDENT IN AN ORGANIZED HEALTH CARE EDUCATION/TRAINING PROGRAM

## 2024-07-21 RX ORDER — ACETAMINOPHEN 325 MG/1
650 TABLET ORAL EVERY 6 HOURS PRN
Status: DISCONTINUED | OUTPATIENT
Start: 2024-07-21 | End: 2024-07-23 | Stop reason: HOSPADM

## 2024-07-21 RX ORDER — OXYTOCIN/0.9 % SODIUM CHLORIDE 30/500 ML
250 PLASTIC BAG, INJECTION (ML) INTRAVENOUS CONTINUOUS
Status: ACTIVE | OUTPATIENT
Start: 2024-07-21 | End: 2024-07-21

## 2024-07-21 RX ORDER — MORPHINE SULFATE 2 MG/ML
1 INJECTION, SOLUTION INTRAMUSCULAR; INTRAVENOUS EVERY 4 HOURS PRN
Status: DISCONTINUED | OUTPATIENT
Start: 2024-07-21 | End: 2024-07-23 | Stop reason: HOSPADM

## 2024-07-21 RX ORDER — TRANEXAMIC ACID 10 MG/ML
1000 INJECTION, SOLUTION INTRAVENOUS ONCE AS NEEDED
Status: DISCONTINUED | OUTPATIENT
Start: 2024-07-21 | End: 2024-07-23 | Stop reason: HOSPADM

## 2024-07-21 RX ORDER — TEMAZEPAM 7.5 MG/1
7.5 CAPSULE ORAL NIGHTLY PRN
Status: DISCONTINUED | OUTPATIENT
Start: 2024-07-21 | End: 2024-07-23 | Stop reason: HOSPADM

## 2024-07-21 RX ORDER — OXYTOCIN/0.9 % SODIUM CHLORIDE 30/500 ML
125 PLASTIC BAG, INJECTION (ML) INTRAVENOUS ONCE AS NEEDED
Status: DISCONTINUED | OUTPATIENT
Start: 2024-07-21 | End: 2024-07-23 | Stop reason: HOSPADM

## 2024-07-21 RX ORDER — SODIUM CHLORIDE 0.9 % (FLUSH) 0.9 %
1-10 SYRINGE (ML) INJECTION AS NEEDED
Status: DISCONTINUED | OUTPATIENT
Start: 2024-07-21 | End: 2024-07-23 | Stop reason: HOSPADM

## 2024-07-21 RX ORDER — NALOXONE HCL 0.4 MG/ML
0.1 VIAL (ML) INJECTION
Status: DISCONTINUED | OUTPATIENT
Start: 2024-07-21 | End: 2024-07-23 | Stop reason: HOSPADM

## 2024-07-21 RX ORDER — BISACODYL 10 MG
10 SUPPOSITORY, RECTAL RECTAL DAILY PRN
Status: DISCONTINUED | OUTPATIENT
Start: 2024-07-22 | End: 2024-07-23 | Stop reason: HOSPADM

## 2024-07-21 RX ORDER — HYDROCODONE BITARTRATE AND ACETAMINOPHEN 10; 325 MG/1; MG/1
1 TABLET ORAL EVERY 4 HOURS PRN
Status: DISCONTINUED | OUTPATIENT
Start: 2024-07-21 | End: 2024-07-23 | Stop reason: HOSPADM

## 2024-07-21 RX ORDER — HYDROCODONE BITARTRATE AND ACETAMINOPHEN 5; 325 MG/1; MG/1
1 TABLET ORAL EVERY 4 HOURS PRN
Status: DISCONTINUED | OUTPATIENT
Start: 2024-07-21 | End: 2024-07-23 | Stop reason: HOSPADM

## 2024-07-21 RX ORDER — PROMETHAZINE HYDROCHLORIDE 12.5 MG/1
12.5 TABLET ORAL EVERY 4 HOURS PRN
Status: DISCONTINUED | OUTPATIENT
Start: 2024-07-21 | End: 2024-07-23 | Stop reason: HOSPADM

## 2024-07-21 RX ORDER — ONDANSETRON 4 MG/1
4 TABLET, ORALLY DISINTEGRATING ORAL EVERY 6 HOURS PRN
Status: DISCONTINUED | OUTPATIENT
Start: 2024-07-21 | End: 2024-07-23 | Stop reason: HOSPADM

## 2024-07-21 RX ORDER — ONDANSETRON 2 MG/ML
4 INJECTION INTRAMUSCULAR; INTRAVENOUS EVERY 6 HOURS PRN
Status: DISCONTINUED | OUTPATIENT
Start: 2024-07-21 | End: 2024-07-23 | Stop reason: HOSPADM

## 2024-07-21 RX ORDER — OXYTOCIN/0.9 % SODIUM CHLORIDE 30/500 ML
999 PLASTIC BAG, INJECTION (ML) INTRAVENOUS ONCE
Status: DISCONTINUED | OUTPATIENT
Start: 2024-07-21 | End: 2024-07-21 | Stop reason: HOSPADM

## 2024-07-21 RX ORDER — MISOPROSTOL 200 UG/1
800 TABLET ORAL ONCE AS NEEDED
Status: COMPLETED | OUTPATIENT
Start: 2024-07-21 | End: 2024-07-21

## 2024-07-21 RX ORDER — METHYLERGONOVINE MALEATE 0.2 MG/ML
200 INJECTION INTRAVENOUS ONCE AS NEEDED
Status: DISCONTINUED | OUTPATIENT
Start: 2024-07-21 | End: 2024-07-21 | Stop reason: HOSPADM

## 2024-07-21 RX ORDER — DOCUSATE SODIUM 100 MG/1
100 CAPSULE, LIQUID FILLED ORAL 2 TIMES DAILY
Status: DISCONTINUED | OUTPATIENT
Start: 2024-07-21 | End: 2024-07-23 | Stop reason: HOSPADM

## 2024-07-21 RX ORDER — ENOXAPARIN SODIUM 100 MG/ML
40 INJECTION SUBCUTANEOUS EVERY 24 HOURS
Status: DISCONTINUED | OUTPATIENT
Start: 2024-07-22 | End: 2024-07-23 | Stop reason: HOSPADM

## 2024-07-21 RX ORDER — NALOXONE HCL 0.4 MG/ML
0.4 VIAL (ML) INJECTION
Status: DISCONTINUED | OUTPATIENT
Start: 2024-07-21 | End: 2024-07-23 | Stop reason: HOSPADM

## 2024-07-21 RX ORDER — CARBOPROST TROMETHAMINE 250 UG/ML
250 INJECTION, SOLUTION INTRAMUSCULAR
Status: DISCONTINUED | OUTPATIENT
Start: 2024-07-21 | End: 2024-07-21 | Stop reason: HOSPADM

## 2024-07-21 RX ORDER — MISOPROSTOL 200 UG/1
800 TABLET ORAL AS NEEDED
Status: DISCONTINUED | OUTPATIENT
Start: 2024-07-21 | End: 2024-07-23 | Stop reason: HOSPADM

## 2024-07-21 RX ORDER — METOCLOPRAMIDE 10 MG/1
10 TABLET ORAL ONCE
Status: DISCONTINUED | OUTPATIENT
Start: 2024-07-21 | End: 2024-07-23 | Stop reason: HOSPADM

## 2024-07-21 RX ADMIN — ONDANSETRON 4 MG: 2 INJECTION INTRAMUSCULAR; INTRAVENOUS at 01:11

## 2024-07-21 RX ADMIN — MISOPROSTOL 600 MCG: 200 TABLET ORAL at 14:42

## 2024-07-21 RX ADMIN — DIPHENHYDRAMINE HYDROCHLORIDE 12.5 MG: 50 INJECTION, SOLUTION INTRAMUSCULAR; INTRAVENOUS at 03:00

## 2024-07-21 RX ADMIN — ONDANSETRON 4 MG: 2 INJECTION INTRAMUSCULAR; INTRAVENOUS at 22:17

## 2024-07-21 RX ADMIN — ACETAMINOPHEN 325MG 650 MG: 325 TABLET ORAL at 15:02

## 2024-07-21 RX ADMIN — SODIUM CHLORIDE, POTASSIUM CHLORIDE, SODIUM LACTATE AND CALCIUM CHLORIDE 125 ML/HR: 600; 310; 30; 20 INJECTION, SOLUTION INTRAVENOUS at 13:41

## 2024-07-21 RX ADMIN — DOCUSATE SODIUM 100 MG: 100 CAPSULE, LIQUID FILLED ORAL at 21:05

## 2024-07-21 RX ADMIN — ACETAMINOPHEN 325MG 650 MG: 325 TABLET ORAL at 21:05

## 2024-07-21 RX ADMIN — Medication: at 22:12

## 2024-07-21 RX ADMIN — ACETAMINOPHEN 325MG 650 MG: 325 TABLET ORAL at 01:16

## 2024-07-21 RX ADMIN — Medication 10 ML/HR: at 01:52

## 2024-07-21 RX ADMIN — Medication 10 ML/HR: at 07:04

## 2024-07-21 RX ADMIN — Medication 250 ML/HR: at 14:50

## 2024-07-21 RX ADMIN — Medication 10 ML/HR: at 13:20

## 2024-07-21 NOTE — NURSING NOTE
RN at bedside with pt pushing from @[id number], RN continuously monitoring FHT's.     Multiple pushing positions performed from @5005-9161 (side lying, closed knees, hands and knees, HOB elevated, etc.).

## 2024-07-21 NOTE — PLAN OF CARE
Goal Outcome Evaluation:  Plan of Care Reviewed With: patient        Progress: improving  Outcome Evaluation: Pt was IOL with cytotec, pitocin and AROM. Pt had vaginal delivery with forceps @1433. Pt QBL was 187cc and had a 1st degree tear that was repaired. Pt transferred to postpartum at end of recovery.

## 2024-07-21 NOTE — NURSING NOTE
Forcep delivery performed by .    Forceps placed @1427  Forceps removed @1427    Forceps placed @1428  Forceps removed @1428    Forceps placed @1429  Forceps locked @1430  Forceps unlocked and removed @1431

## 2024-07-21 NOTE — PROGRESS NOTES
Patient is pushing well.  Fetal heart tones with adequate variability  Fetal vertex not a lot of change since taking a break from pushing and trying multiple position changes  Patient's pelvis feels adequate and given gestational age of 36 weeks, there is hope that she will be able to effectively lower the station of presenting vertex.  We discussed different management options at this stage and I have recommended that we continue to push her while longer with the hope that she can either spontaneously deliver or bring the baby into position where we could safely attempt low or outlet assist.  Patient and family voiced understanding and agreement

## 2024-07-21 NOTE — ANESTHESIA POSTPROCEDURE EVALUATION
Patient: Carina Galindo    Procedure Summary       Date: 07/20/24 Room / Location:     Anesthesia Start: 1121 Anesthesia Stop: 07/21/24 1433    Procedure: LABOR ANALGESIA Diagnosis:     Scheduled Providers:  Provider: Karen Perez MD    Anesthesia Type: epidural ASA Status: 3            Anesthesia Type: epidural    Vitals  Vitals Value Taken Time   /101 07/21/24 1630   Temp 37.8 °C (100.1 °F) 07/21/24 1516   Pulse 132 07/21/24 1630   Resp 16 07/21/24 1615   SpO2 100 % 07/21/24 1510   Vitals shown include unfiled device data.        Post Anesthesia Care and Evaluation      Comments: No apparent anesthetic complications

## 2024-07-21 NOTE — PROGRESS NOTES
Carina has brought the vertex to a solid +2 presentation.  She is pushed over 3 hours and exhaustion has started to sudden.  Options of management again reviewed with patient and family they are requesting a trial of forcep delivery  I have reviewed the procedure its risk and potential complications and possibility will be unsuccessful and we would then need to proceed with low-transverse  section.  Patient and family voiced understanding and would like to proceed.

## 2024-07-21 NOTE — L&D DELIVERY NOTE
[facility]  Vaginal Delivery Note via low forceps    Delivery     Delivery: Vaginal, Spontaneous    Active Hospital Problems    Diagnosis  POA    **Cholestasis during pregnancy in third trimester [O26.643]  Yes    Nuchal cord affecting delivery [O69.81X0]  Unknown    Obesity in pregnancy, antepartum [O99.210]  Yes    Abnormal glucose tolerance test (GTT) during pregnancy, antepartum [O99.810]  Yes     3-hour glucose test within normal limits      Maternal anemia in pregnancy, antepartum [O99.019]  Yes    Antiphospholipid antibody syndrome complicating pregnancy- cardiolipin AB + [O99.119, D68.61]  Yes    Systemic lupus erythematosus- + anticardio AB, neg SS-A/SS-B [O99.891, M32.9]  Yes    Anxiety [F41.9]  Yes    Asthma [J45.909]  Yes      YOB: 2026    Time of Birth: 2:33 PM      Anesthesia: epidural    Delivering clinician: DULCE Chance    Pre-op Diagnosis:  Intrauterine pregnancy at 36w1d  Labor status:      Cholestasis during pregnancy in third trimester    Anxiety    Asthma    Antiphospholipid antibody syndrome complicating pregnancy- cardiolipin AB +    Systemic lupus erythematosus- + anticardio AB, neg SS-A/SS-B    Maternal anemia in pregnancy, antepartum    Abnormal glucose tolerance test (GTT) during pregnancy, antepartum    Obesity in pregnancy, antepartum    Nuchal cord affecting delivery      Post-op diagnosis:  Same                  Delivery narrative: Patient had been pushing approximately 3 and half hours.  After pushing for the first hour with Dr. Hollins, she rested for approximately 1 hour tried multiple positions.  Then she pushed another 2 and half hours eventually breaking the vertex to good plus to almost +3 station.  Patient and mother had repeatedly said they wanted to try to do anything to avoid .  I explained she was a candidate for a trial of low forceps and explained risks potential benefits.  They wish to proceed.  Neonatology and anesthesia and nursing staff were all  notified her upcoming delivery.  Bladder was drained.              Patient was prepped and draped. The bladder had been drained.  Vertex was again palpated at a good +2 to +3 station, position felt to be SCOOBY.  Solid blade forceps were easily applied.  With the next contraction as she pushed I was able to easily guide the vertex under the pubic symphysis and once the vertex was  60 to 70% forceps were removed.  With next contraction Carina pushed for a spontaneous vaginal delivery, as the vertex was gently guided over the perineum.  There was a tight nuchal cord that could not be reduced.  It was doubly clamped and ligated.  With gentle downward traction the anterior shoulder delivered, and I delivered the left arm.  The posterior shoulder then easily was delivered and the perineum was supported during the delivery process. Bulb suction was used for the mouth and oropharynx.   The infant had a week cry was  handed off to the waiting neonatology staff to admit previous notified .              the placenta followed  Spontaneously, it  was intact and with a 3 vessel cord.Uterus was massaged and firmed up appropriately.  Pitocin was given in the IVF's.  The first-degree midline scrape was repaired with 3-0 Vicryl.  All counts were correct, mom and infant were doing well.  Because of length of pushing and maternal exhaustion, 600 mcg of Cytotec was given prophylactically      Infant    Findings: male  infant  36w1d    Weight: 3080 g (6 lb 12.6 oz)   Length: 20  in  Observations/Comments:  LDR 3 Panda      0  @ 1 minute /   7  @ 5 minutes      Infant observations:            Infant Name: Pasquale Ferraro     Placenta, Cord, and Fluid    Placenta delivered  Spontaneous  at  2024  2:37 PM     Cord: 3 vessels  present.   Nuchal Cord?  yes; Number of nuchal loops present:  1   Cord blood obtained: yes                 Repair    Episiotomy: No   Lacerations:  Laceration first-degree   Estimated Blood Loss: See EMR for  quantitative blood loss          Complications:                    None          Disposition  Carina was doing well.  Infant was stable we had her to spontaneous cry and had spontaneous respirations.  Neonatology was still in the process of evaluation    Dean Chance MD  07/21/24  14:52 EDT

## 2024-07-22 LAB
ALBUMIN SERPL-MCNC: 3.1 G/DL (ref 3.9–4.9)
ALP SERPL-CCNC: 267 IU/L (ref 44–121)
ALT SERPL-CCNC: 22 IU/L (ref 0–32)
AST SERPL-CCNC: 20 IU/L (ref 0–40)
BACTERIA UR QL AUTO: ABNORMAL /HPF
BILE AC SERPL-SCNC: 32.2 UMOL/L (ref 0–10)
BILIRUB SERPL-MCNC: 0.2 MG/DL (ref 0–1.2)
BILIRUB UR QL STRIP: NEGATIVE
BUN SERPL-MCNC: 5 MG/DL (ref 6–20)
BUN/CREAT SERPL: 10 (ref 9–23)
CALCIUM SERPL-MCNC: 8.5 MG/DL (ref 8.7–10.2)
CHLORIDE SERPL-SCNC: 106 MMOL/L (ref 96–106)
CLARITY UR: CLEAR
CO2 SERPL-SCNC: 20 MMOL/L (ref 20–29)
COLOR UR: YELLOW
CREAT SERPL-MCNC: 0.52 MG/DL (ref 0.57–1)
DEPRECATED RDW RBC AUTO: 37 FL (ref 37–54)
EGFRCR SERPLBLD CKD-EPI 2021: 127 ML/MIN/1.73
EOSINOPHIL # BLD MANUAL: 0.32 10*3/MM3 (ref 0–0.4)
EOSINOPHIL NFR BLD MANUAL: 1 % (ref 0.3–6.2)
ERYTHROCYTE [DISTWIDTH] IN BLOOD BY AUTOMATED COUNT: 12.2 % (ref 12.3–15.4)
GLOBULIN SER CALC-MCNC: 2.8 G/DL (ref 1.5–4.5)
GLUCOSE SERPL-MCNC: 75 MG/DL (ref 70–99)
GLUCOSE UR STRIP-MCNC: NEGATIVE MG/DL
HCT VFR BLD AUTO: 31.1 % (ref 34–46.6)
HGB BLD-MCNC: 10.7 G/DL (ref 12–15.9)
HGB UR QL STRIP.AUTO: ABNORMAL
HYALINE CASTS UR QL AUTO: ABNORMAL /LPF
KETONES UR QL STRIP: NEGATIVE
LEUKOCYTE ESTERASE UR QL STRIP.AUTO: ABNORMAL
LYMPHOCYTES # BLD MANUAL: 1.29 10*3/MM3 (ref 0.7–3.1)
LYMPHOCYTES NFR BLD MANUAL: 4 % (ref 5–12)
MCH RBC QN AUTO: 29.1 PG (ref 26.6–33)
MCHC RBC AUTO-ENTMCNC: 34.4 G/DL (ref 31.5–35.7)
MCV RBC AUTO: 84.5 FL (ref 79–97)
MONOCYTES # BLD: 1.29 10*3/MM3 (ref 0.1–0.9)
NEUTROPHILS # BLD AUTO: 29.46 10*3/MM3 (ref 1.7–7)
NEUTROPHILS NFR BLD MANUAL: 91 % (ref 42.7–76)
NITRITE UR QL STRIP: NEGATIVE
NRBC BLD AUTO-RTO: 0 /100 WBC (ref 0–0.2)
PH UR STRIP.AUTO: 6.5 [PH] (ref 5–8)
PLAT MORPH BLD: NORMAL
PLATELET # BLD AUTO: 290 10*3/MM3 (ref 140–450)
PMV BLD AUTO: 10.2 FL (ref 6–12)
POTASSIUM SERPL-SCNC: 3.5 MMOL/L (ref 3.5–5.2)
PROT SERPL-MCNC: 5.9 G/DL (ref 6–8.5)
PROT UR QL STRIP: NEGATIVE
RBC # BLD AUTO: 3.68 10*6/MM3 (ref 3.77–5.28)
RBC # UR STRIP: ABNORMAL /HPF
RBC MORPH BLD: NORMAL
REF LAB TEST METHOD: ABNORMAL
SODIUM SERPL-SCNC: 138 MMOL/L (ref 134–144)
SP GR UR STRIP: 1.01 (ref 1–1.03)
SQUAMOUS #/AREA URNS HPF: ABNORMAL /HPF
UROBILINOGEN UR QL STRIP: ABNORMAL
VARIANT LYMPHS NFR BLD MANUAL: 4 % (ref 19.6–45.3)
WBC # UR STRIP: ABNORMAL /HPF
WBC MORPH BLD: NORMAL
WBC NRBC COR # BLD AUTO: 32.37 10*3/MM3 (ref 3.4–10.8)

## 2024-07-22 PROCEDURE — 85007 BL SMEAR W/DIFF WBC COUNT: CPT | Performed by: OBSTETRICS & GYNECOLOGY

## 2024-07-22 PROCEDURE — 85025 COMPLETE CBC W/AUTO DIFF WBC: CPT | Performed by: OBSTETRICS & GYNECOLOGY

## 2024-07-22 PROCEDURE — 63710000001 ONDANSETRON ODT 4 MG TABLET DISPERSIBLE: Performed by: OBSTETRICS & GYNECOLOGY

## 2024-07-22 PROCEDURE — 87186 SC STD MICRODIL/AGAR DIL: CPT | Performed by: STUDENT IN AN ORGANIZED HEALTH CARE EDUCATION/TRAINING PROGRAM

## 2024-07-22 PROCEDURE — 81001 URINALYSIS AUTO W/SCOPE: CPT | Performed by: STUDENT IN AN ORGANIZED HEALTH CARE EDUCATION/TRAINING PROGRAM

## 2024-07-22 PROCEDURE — 87088 URINE BACTERIA CULTURE: CPT | Performed by: STUDENT IN AN ORGANIZED HEALTH CARE EDUCATION/TRAINING PROGRAM

## 2024-07-22 PROCEDURE — 0503F POSTPARTUM CARE VISIT: CPT | Performed by: STUDENT IN AN ORGANIZED HEALTH CARE EDUCATION/TRAINING PROGRAM

## 2024-07-22 PROCEDURE — 25010000002 ENOXAPARIN PER 10 MG: Performed by: OBSTETRICS & GYNECOLOGY

## 2024-07-22 PROCEDURE — 87086 URINE CULTURE/COLONY COUNT: CPT | Performed by: STUDENT IN AN ORGANIZED HEALTH CARE EDUCATION/TRAINING PROGRAM

## 2024-07-22 RX ORDER — HYDROXYCHLOROQUINE SULFATE 200 MG/1
400 TABLET, FILM COATED ORAL
Status: DISCONTINUED | OUTPATIENT
Start: 2024-07-22 | End: 2024-07-23 | Stop reason: HOSPADM

## 2024-07-22 RX ORDER — SULFAMETHOXAZOLE AND TRIMETHOPRIM 800; 160 MG/1; MG/1
1 TABLET ORAL EVERY 12 HOURS SCHEDULED
Status: DISCONTINUED | OUTPATIENT
Start: 2024-07-22 | End: 2024-07-23 | Stop reason: HOSPADM

## 2024-07-22 RX ADMIN — ONDANSETRON 4 MG: 4 TABLET, ORALLY DISINTEGRATING ORAL at 18:34

## 2024-07-22 RX ADMIN — ACETAMINOPHEN 325MG 650 MG: 325 TABLET ORAL at 12:10

## 2024-07-22 RX ADMIN — DOCUSATE SODIUM 100 MG: 100 CAPSULE, LIQUID FILLED ORAL at 08:01

## 2024-07-22 RX ADMIN — SULFAMETHOXAZOLE AND TRIMETHOPRIM 1 TABLET: 800; 160 TABLET ORAL at 12:09

## 2024-07-22 RX ADMIN — DOCUSATE SODIUM 100 MG: 100 CAPSULE, LIQUID FILLED ORAL at 20:33

## 2024-07-22 RX ADMIN — HYDROCODONE BITARTRATE AND ACETAMINOPHEN 1 TABLET: 5; 325 TABLET ORAL at 07:58

## 2024-07-22 RX ADMIN — ENOXAPARIN SODIUM 40 MG: 100 INJECTION SUBCUTANEOUS at 15:12

## 2024-07-22 RX ADMIN — HYDROXYCHLOROQUINE SULFATE 400 MG: 200 TABLET, FILM COATED ORAL at 12:10

## 2024-07-22 RX ADMIN — HYDROCODONE BITARTRATE AND ACETAMINOPHEN 1 TABLET: 10; 325 TABLET ORAL at 18:34

## 2024-07-22 RX ADMIN — HYDROCODONE BITARTRATE AND ACETAMINOPHEN 1 TABLET: 10; 325 TABLET ORAL at 23:16

## 2024-07-22 RX ADMIN — SULFAMETHOXAZOLE AND TRIMETHOPRIM 1 TABLET: 800; 160 TABLET ORAL at 23:16

## 2024-07-22 RX ADMIN — HYDROCODONE BITARTRATE AND ACETAMINOPHEN 1 TABLET: 10; 325 TABLET ORAL at 12:19

## 2024-07-22 RX ADMIN — ACETAMINOPHEN 325MG 650 MG: 325 TABLET ORAL at 03:12

## 2024-07-22 RX ADMIN — SERTRALINE 50 MG: 50 TABLET, FILM COATED ORAL at 20:33

## 2024-07-22 RX ADMIN — Medication: at 07:59

## 2024-07-22 RX ADMIN — ACETAMINOPHEN 325MG 650 MG: 325 TABLET ORAL at 18:33

## 2024-07-22 NOTE — PROGRESS NOTES
UA          7/9/2024    11:19 7/12/2024    11:12 7/22/2024    09:10   Urinalysis   Squamous Epithelial Cells, UA 7-12   0-2    Specific Gravity, UA 1.007   1.007    Ketones, UA Negative  Negative  Negative    Blood, UA Negative   Large (3+)    Leukocytes, UA Trace  Small (1+)  Moderate (2+)    Nitrite, UA Negative   Negative    RBC, UA 0-2   Too Numerous to Count    WBC, UA 0-2   21-50    Bacteria, UA None Seen   3+          CBC          7/12/2024    12:16 7/20/2024    03:38 7/22/2024    06:32   CBC   WBC 13.39  16.54  32.37    RBC 4.15  3.98  3.68    Hemoglobin 12.1  11.5  10.7    Hematocrit 36.1  33.7  31.1    MCV 87.0  84.7  84.5    MCH 29.2  28.9  29.1    MCHC 33.5  34.1  34.4    RDW 11.9  11.9  12.2    Platelets 332  301  290        Urine sent for culture.  Will empirically treat for UTI with bactrim DS BID x 3 days.      Erin Pérez MD  07/22/24  09:54 EDT

## 2024-07-22 NOTE — PROGRESS NOTES
Postpartum  Day#1    Subjective:    Chief Complaint   Patient presents with    Scheduled Induction     Denies vaginal bleeding, contractions, or LOF     Pt doing well. Pain well controlled. Lochia normal. Ambulating well. Tolerating regular diet. Voiding without difficulty. No complaints    OB History    Para Term  AB Living   1 1 0 1 0 1   SAB IAB Ectopic Molar Multiple Live Births   0 0 0 0 0 1      # Outcome Date GA Lbr Jah/2nd Weight Sex Type Anes PTL Lv   1  24 36w1d 11:03 / 09:48 3080 g (6 lb 12.6 oz) M Vag-Forceps EPI N LLOYD      Birth Comments: LDR 3 Panda      Complications: Fetal Intolerance, Meconium stained amniotic fluid      Obstetric Comments   3/29/24 - 19-6 weeks - normal, completed anatomy, normal cl - ant placenta -[facility]          Vitals:   Wt Readings from Last 3 Encounters:   24 109 kg (240 lb 4.8 oz)   24 107 kg (236 lb)   24 106 kg (234 lb)     Temp Readings from Last 3 Encounters:   24 97.8 °F (36.6 °C) (Oral)   24 98.7 °F (37.1 °C) (Temporal)   24 98.4 °F (36.9 °C) (Oral)     BP Readings from Last 3 Encounters:   24 125/90   24 122/78   24 126/82     Pulse Readings from Last 3 Encounters:   24 89   24 99   24 95   ROS:      ROS:Pulm: neg for soa  GI: neg for heavy bleeding              Musculoskel: neg for leg pain        LABS:  Lab Results (last 24 hours)       Procedure Component Value Units Date/Time    Urinalysis, Microscopic Only - Urine, Clean Catch [[id number]]  (Abnormal) Collected: 24 0910    Specimen: Urine, Clean Catch Updated: 24 09     RBC, UA Too Numerous to Count /HPF      WBC, UA 21-50 /HPF      Bacteria, UA 3+ /HPF      Squamous Epithelial Cells, UA 0-2 /HPF      Hyaline Casts, UA None Seen /LPF      Methodology Automated Microscopy    Urinalysis With Culture If Indicated - Urine, Clean Catch [[id number]]  (Abnormal) Collected: 24 0910     Specimen: Urine, Clean Catch Updated: 07/22/24 0934     Color, UA Yellow     Appearance, UA Clear     pH, UA 6.5     Specific Gravity, UA 1.007     Glucose, UA Negative     Ketones, UA Negative     Bilirubin, UA Negative     Blood, UA Large (3+)     Protein, UA Negative     Leuk Esterase, UA Moderate (2+)     Nitrite, UA Negative     Urobilinogen, UA 0.2 E.U./dL    Narrative:      In absence of clinical symptoms, the presence of pyuria, bacteria, and/or nitrites on the urinalysis result does not correlate with infection.    Urine Culture - Urine, Urine, Clean Catch [[id number]] Collected: 07/22/24 0910    Specimen: Urine, Clean Catch Updated: 07/22/24 0929    Manual Differential [[id number]]  (Abnormal) Collected: 07/22/24 0632    Specimen: Blood Updated: 07/22/24 0758     Neutrophil % 91.0 %      Lymphocyte % 4.0 %      Monocyte % 4.0 %      Eosinophil % 1.0 %      Neutrophils Absolute 29.46 10*3/mm3      Lymphocytes Absolute 1.29 10*3/mm3      Monocytes Absolute 1.29 10*3/mm3      Eosinophils Absolute 0.32 10*3/mm3      RBC Morphology Normal     WBC Morphology Normal     Platelet Morphology Normal    CBC & Differential [[id number]]  (Abnormal) Collected: 07/22/24 0632    Specimen: Blood Updated: 07/22/24 0740    Narrative:      The following orders were created for panel order CBC & Differential.  Procedure                               Abnormality         Status                     ---------                               -----------         ------                     CBC Auto Differential[[id number]]        Abnormal            Final result                 Please view results for these tests on the individual orders.    CBC Auto Differential [[id number]]  (Abnormal) Collected: 07/22/24 0632    Specimen: Blood Updated: 07/22/24 0740     WBC 32.37 10*3/mm3      RBC 3.68 10*6/mm3      Hemoglobin 10.7 g/dL      Hematocrit 31.1 %      MCV 84.5 fL      MCH 29.1 pg      MCHC 34.4 g/dL      RDW 12.2 %      RDW-SD 37.0 fl       MPV 10.2 fL      Platelets 290 10*3/mm3      nRBC 0.0 /100 WBC             Exam:   Gen: NAD, cooperative, conversive  Abd: Soft, nondistended, fundus is firm below umbilicus, approp tender  Ext: Nontender bilaterally  Lochia normal        Principal Problem:    Cholestasis during pregnancy in third trimester  Active Problems:    Anxiety    Asthma    Antiphospholipid antibody syndrome complicating pregnancy- cardiolipin AB +    Systemic lupus erythematosus- + anticardio AB, neg SS-A/SS-B    Maternal anemia in pregnancy, antepartum    Abnormal glucose tolerance test (GTT) during pregnancy, antepartum      Overview: 3-hour glucose test within normal limits    Obesity in pregnancy, antepartum    Nuchal cord affecting delivery       Assessment::   Carina Galindo is a 29 y.o. female  s/p Vaginal, Forceps       1. PPD#1 ,  2. Leucocytosis-clinically reassuring exam, U/A possible UTI, started on Bactrim , awaiting culture  3. Incontinence resolved  4. Ambulation encouraged.  5. Restart home meds for anx & SLE  6. Recheck cbc in AM          guadalupe Chance MD     2024 17:19 EDT

## 2024-07-22 NOTE — PROGRESS NOTES
[facility]   Obstetrics and Gynecology     2024    Name:Carina Galindo   MR#:[id number]    Forceps-assisted Vaginal Delivery Progress Note    HD#2    Subjective   Postpartum Day 1: 29 y.o. female  delivered at 36w1d  delivered a male  infant.     The patient feels well.  Her pain is controlled.    She is ambulating well.  Patient describes her bleeding as moderate lochia.  She is urinating spontaneously.  She had several episodes of incontinence yesterday but feels like this is improving this morning.      Breastfeeding/bottle:  The patient is currently breastfeeding.    Patient Active Problem List   Diagnosis    Anxiety    Asthma    Antiphospholipid antibody syndrome complicating pregnancy- cardiolipin AB +    Systemic lupus erythematosus- + anticardio AB, neg SS-A/SS-B    Intrahepatic cholestasis of pregnancy in third trimester    Maternal anemia in pregnancy, antepartum    Abnormal glucose tolerance test (GTT) during pregnancy, antepartum    Pregnancy    Obesity in pregnancy, antepartum    Cholestasis during pregnancy in third trimester    Nuchal cord affecting delivery       Objective   Vital Signs Range for the last 24 hours  Temp: Temp:  [97.6 °F (36.4 °C)-100.1 °F (37.8 °C)] 97.6 °F (36.4 °C) Temp src: Oral   BP: BP: (104-156)/() 115/81        Pulse: Heart Rate:  [] 87  RR: Resp:  [16-18] 16  Weight: 109 kg (240 lb 4.8 oz)  BMI:  Body mass index is 41.25 kg/m².    Results from last 7 days   Lab Units 24  0632 24  0338   WBC 10*3/mm3 32.37* 16.54*   HEMOGLOBIN g/dL 10.7* 11.5*   HEMATOCRIT % 31.1* 33.7*   PLATELETS 10*3/mm3 290 301     Results from last 7 days   Lab Units 24  0338 24  1145   SODIUM mmol/L 137 138   POTASSIUM mmol/L 3.5 3.4*   CHLORIDE mmol/L 108* 106   CO2 mmol/L 17.9* 21.5*   BUN mg/dL 4* 4*   CREATININE mg/dL 0.42* 0.55*   CALCIUM mg/dL 8.8 9.0   ALK PHOS U/L 243* 250*   ALT (SGPT) U/L 24 23   AST (SGOT) U/L 29 19   GLUCOSE mg/dL  82 74       Physical Exam  Vitals and nursing note reviewed.   Constitutional:       General: She is not in acute distress.     Appearance: Normal appearance.   Cardiovascular:      Pulses: Normal pulses.   Pulmonary:      Effort: Pulmonary effort is normal.   Abdominal:      General: There is no distension.      Palpations: Abdomen is soft.      Tenderness: There is no abdominal tenderness. There is no guarding or rebound.   Genitourinary:     Comments: Uterus firm and below umbilicus  Musculoskeletal:         General: No swelling or tenderness.      Right lower leg: No edema.      Left lower leg: No edema.   Neurological:      Mental Status: She is alert and oriented to person, place, and time.   Psychiatric:         Mood and Affect: Mood normal.         Behavior: Behavior normal.         Assessment/Plan   1.  PPD# 1    Cholestasis during pregnancy in third trimester    Anxiety    Asthma    Antiphospholipid antibody syndrome complicating pregnancy- cardiolipin AB +    Systemic lupus erythematosus- + anticardio AB, neg SS-A/SS-B    Maternal anemia in pregnancy, antepartum    Abnormal glucose tolerance test (GTT) during pregnancy, antepartum    Obesity in pregnancy, antepartum    Nuchal cord affecting delivery    2. Urinary incontinence s/p FAVD - improving; considering leukocytosis, urine culture ordered     3. Leukocytosis - WBC 32, afebrile, VSS, uterus nontender, repeat CBC in AM  4. Anxiety - home zoloft restarted  5. SLE - home plaquenil restarted    Plan:   Continue routine postpartum care    Erin Pérez MD  7/22/2024 08:59 EDT

## 2024-07-22 NOTE — NURSING NOTE
Dr. Pérez at bedside.  Notified of critical WBC and incontinent issues.  Labs ordered for am.  No other orders.  Will continue to monitor.

## 2024-07-22 NOTE — LACTATION NOTE
P1 36 weeks    Infant transferred to [facility].  RN set up HGP, parked at bedside.  Patient expressed how traumatic delivery was and has not felt up to pumping yet.  Patient stated she would like to express colostrum for baby but is unsure of future feeding plan.  D/w the first 3 days following delivery as most crucial for establishing milk supply.  Active listening provided and reassurance of support given.  Encouraged patient to call when she is ready to pump.  She has PBP.  LC number on WB, will follow as needed.

## 2024-07-23 VITALS
OXYGEN SATURATION: 97 % | SYSTOLIC BLOOD PRESSURE: 120 MMHG | WEIGHT: 240.3 LBS | RESPIRATION RATE: 16 BRPM | HEIGHT: 64 IN | HEART RATE: 82 BPM | DIASTOLIC BLOOD PRESSURE: 81 MMHG | BODY MASS INDEX: 41.03 KG/M2 | TEMPERATURE: 97.7 F

## 2024-07-23 LAB
BASOPHILS # BLD AUTO: 0.09 10*3/MM3 (ref 0–0.2)
BASOPHILS NFR BLD AUTO: 0.5 % (ref 0–1.5)
CREAT UR-MCNC: 65.4 MG/DL
DEPRECATED RDW RBC AUTO: 38 FL (ref 37–54)
EOSINOPHIL # BLD AUTO: 0.38 10*3/MM3 (ref 0–0.4)
EOSINOPHIL NFR BLD AUTO: 2.3 % (ref 0.3–6.2)
ERYTHROCYTE [DISTWIDTH] IN BLOOD BY AUTOMATED COUNT: 12.2 % (ref 12.3–15.4)
HCT VFR BLD AUTO: 30.4 % (ref 34–46.6)
HGB BLD-MCNC: 10.3 G/DL (ref 12–15.9)
IMM GRANULOCYTES # BLD AUTO: 0.49 10*3/MM3 (ref 0–0.05)
IMM GRANULOCYTES NFR BLD AUTO: 2.9 % (ref 0–0.5)
LYMPHOCYTES # BLD AUTO: 2.41 10*3/MM3 (ref 0.7–3.1)
LYMPHOCYTES NFR BLD AUTO: 14.5 % (ref 19.6–45.3)
MCH RBC QN AUTO: 29.1 PG (ref 26.6–33)
MCHC RBC AUTO-ENTMCNC: 33.9 G/DL (ref 31.5–35.7)
MCV RBC AUTO: 85.9 FL (ref 79–97)
MONOCYTES # BLD AUTO: 1.05 10*3/MM3 (ref 0.1–0.9)
MONOCYTES NFR BLD AUTO: 6.3 % (ref 5–12)
NEUTROPHILS NFR BLD AUTO: 12.25 10*3/MM3 (ref 1.7–7)
NEUTROPHILS NFR BLD AUTO: 73.5 % (ref 42.7–76)
NRBC BLD AUTO-RTO: 0 /100 WBC (ref 0–0.2)
PLATELET # BLD AUTO: 263 10*3/MM3 (ref 140–450)
PMV BLD AUTO: 9.7 FL (ref 6–12)
PROT UR-MCNC: 16.1 MG/DL
PROT/CREAT UR: 246 MG/G CREAT (ref 0–200)
RBC # BLD AUTO: 3.54 10*6/MM3 (ref 3.77–5.28)
WBC NRBC COR # BLD AUTO: 16.67 10*3/MM3 (ref 3.4–10.8)
WRITTEN AUTHORIZATION: NORMAL

## 2024-07-23 PROCEDURE — 85025 COMPLETE CBC W/AUTO DIFF WBC: CPT | Performed by: STUDENT IN AN ORGANIZED HEALTH CARE EDUCATION/TRAINING PROGRAM

## 2024-07-23 PROCEDURE — 0503F POSTPARTUM CARE VISIT: CPT | Performed by: OBSTETRICS & GYNECOLOGY

## 2024-07-23 RX ORDER — HYDROCODONE BITARTRATE AND ACETAMINOPHEN 10; 325 MG/1; MG/1
1 TABLET ORAL EVERY 4 HOURS PRN
Qty: 15 TABLET | Refills: 0 | Status: SHIPPED | OUTPATIENT
Start: 2024-07-23 | End: 2024-07-26 | Stop reason: SDUPTHER

## 2024-07-23 RX ORDER — SULFAMETHOXAZOLE AND TRIMETHOPRIM 800; 160 MG/1; MG/1
1 TABLET ORAL EVERY 12 HOURS SCHEDULED
Qty: 3 TABLET | Refills: 0 | Status: SHIPPED | OUTPATIENT
Start: 2024-07-23 | End: 2024-07-25

## 2024-07-23 RX ORDER — ENOXAPARIN SODIUM 100 MG/ML
40 INJECTION SUBCUTANEOUS EVERY 24 HOURS
Qty: 12 ML | Refills: 2 | Status: SHIPPED | OUTPATIENT
Start: 2024-07-23

## 2024-07-23 RX ADMIN — ACETAMINOPHEN 325MG 650 MG: 325 TABLET ORAL at 02:18

## 2024-07-23 RX ADMIN — DOCUSATE SODIUM 100 MG: 100 CAPSULE, LIQUID FILLED ORAL at 08:28

## 2024-07-23 RX ADMIN — HYDROCODONE BITARTRATE AND ACETAMINOPHEN 1 TABLET: 10; 325 TABLET ORAL at 06:15

## 2024-07-23 RX ADMIN — HYDROCODONE BITARTRATE AND ACETAMINOPHEN 1 TABLET: 10; 325 TABLET ORAL at 12:42

## 2024-07-23 RX ADMIN — HYDROXYCHLOROQUINE SULFATE 400 MG: 200 TABLET, FILM COATED ORAL at 08:28

## 2024-07-23 RX ADMIN — SULFAMETHOXAZOLE AND TRIMETHOPRIM 1 TABLET: 800; 160 TABLET ORAL at 08:28

## 2024-07-23 NOTE — DISCHARGE SUMMARY
[facility]   Obstetrics and Gynecology   Vaginal delivery Discharge Summary      Date of Admission: 2024    Date of Discharge:        Patient: Carina Galindo      MR#:[id number]    Surgeon/OB: Dean Chance     Discharge Diagnosis:   Vaginal Delivery at 36w1d, uncomplicated recovery    Cholestasis during pregnancy in third trimester    Anxiety    Asthma    Antiphospholipid antibody syndrome complicating pregnancy- cardiolipin AB +    Systemic lupus erythematosus- + anticardio AB, neg SS-A/SS-B    Maternal anemia in pregnancy, antepartum    Abnormal glucose tolerance test (GTT) during pregnancy, antepartum    Obesity in pregnancy, antepartum    Nuchal cord affecting delivery      Procedures:  Vaginal, Forceps     2024    2:33 PM      Anesthesia:  Epidural     Hospital Course  Patient is a 29 y.o. female  at 36w1d status post vaginal delivery.  Patient had a forceps delivery.  Baby had to be transferred and is on cooling.  Patient has had some incontinence.  Her white blood cell count was elevated at 32 but has come down to 16.  She is afebrile.  She is currently on antibiotics with urine culture pending.  Patient has antiphospholipid antibody syndrome and possible lupus.  She is on Plaquenil, Lovenox and low-dose aspirin.  She will continue these medications.  She is on Zoloft for depression.  She notes her incontinence has improved somewhat but she may need pelvic floor physical therapy.  Patient is ambulating, tolerating a regular diet.    1.  Elevated white blood cell count-White blood cell count has improved from 32.  White blood cell count is now 16.  Patient is afebrile.  Urine culture is pending.  Continue oral antibiotics    2.  Antiphospholipid antibody syndrome and possible lupus-patient will continue Plaquenil, Lovenox and low-dose aspirin    3.  Incontinence-likely due to combination of infection and changes from delivery.  Discussed continuing antibiotics.  Patient may  benefit from pelvic floor physical therapy later.    4.  Depression-continue Zoloft    5.  Discharge today.  Recommend pelvic rest for 6 weeks.  Follow-up in 1 week in the office with Dr. Hollins.  Ambulation encouraged.  Discussed signs and symptoms of DVT and encouraged leg stretches.        Infant:   male  fetus 3080 g (6 lb 12.6 oz)  with Apgar scores of 0 , 5  at five minutes.    Condition on Discharge:  Stable    Vital Signs  Temp:  [97.6 °F (36.4 °C)-97.8 °F (36.6 °C)] 97.7 °F (36.5 °C)  Heart Rate:  [82-92] 82  Resp:  [16] 16  BP: (117-125)/(79-90) 120/81    Results from last 7 days   Lab Units 24  0543 24  0632 24  0338   WBC 10*3/mm3 16.67* 32.37* 16.54*   HEMOGLOBIN g/dL 10.3* 10.7* 11.5*   HEMATOCRIT % 30.4* 31.1* 33.7*   PLATELETS 10*3/mm3 263 290 301     Results from last 7 days   Lab Units 24  0338 24  0000 24  1145   SODIUM mmol/L 137 138 138   POTASSIUM mmol/L 3.5 3.5 3.4*   CHLORIDE mmol/L 108* 106 106   CO2 mmol/L 17.9* 20 21.5*   BUN mg/dL 4* 5* 4*   CREATININE mg/dL 0.42* 0.52* 0.55*   CALCIUM mg/dL 8.8 8.5* 9.0   BILIRUBIN mg/dL 0.2 0.2 0.3   ALK PHOS U/L 243* 267* 250*   ALT (SGPT) U/L 24 22 23   AST (SGOT) U/L 29 20 19   GLUCOSE mg/dL 82 75 74       Discharge Disposition  Home or Self Care    Discharge Medications     Discharge Medications        New Medications        Instructions Start Date   benzocaine-menthol 20-0.5 % aerosol topical spray  Commonly known as: DERMOPLAST   Topical, As Needed      HYDROcodone-acetaminophen  MG per tablet  Commonly known as: NORCO   1 tablet, Oral, Every 4 Hours PRN      sulfamethoxazole-trimethoprim 800-160 MG per tablet  Commonly known as: BACTRIM DS,SEPTRA DS   1 tablet, Oral, Every 12 Hours Scheduled             Changes to Medications        Instructions Start Date   Enoxaparin Sodium 40 MG/0.4ML solution prefilled syringe syringe  Commonly known as: LOVENOX  What changed: Another medication with the same name was  "added. Make sure you understand how and when to take each.   40 mg, Subcutaneous, Every 24 Hours Scheduled      Enoxaparin Sodium 40 MG/0.4ML solution prefilled syringe syringe  Commonly known as: LOVENOX  What changed: You were already taking a medication with the same name, and this prescription was added. Make sure you understand how and when to take each.   40 mg, Subcutaneous, Every 24 Hours             Continue These Medications        Instructions Start Date   acetaminophen 325 MG tablet  Commonly known as: TYLENOL   325 mg, Oral, Every 6 Hours PRN      albuterol sulfate  (90 Base) MCG/ACT inhaler  Commonly known as: Ventolin HFA   2 puffs, Inhalation, Every 4 Hours PRN      Alcohol Swabs 70 % pads   1 Swab, Does not apply, Daily      aspirin 81 MG EC tablet  Commonly known as: ASPIR   Take one tablet by mouth daily until 36 weeks gestation.      B-D 3CC LUER-UNRULY SYR 25GX5/8\" 25G X 5/8\" 3 ML misc  Generic drug: Syringe/Needle (Disp)   Use to inject heparin      cetirizine 10 MG tablet  Commonly known as: zyrTEC   10 mg, Oral, Daily      diphenhydrAMINE 25 mg capsule  Commonly known as: BENADRYL   25 mg, Oral, Every 6 Hours PRN      EPINEPHrine 0.3 MG/0.3ML solution auto-injector injection  Commonly known as: EPIPEN   No dose, route, or frequency recorded.      ferrous sulfate 325 (65 FE) MG tablet   325 mg, Oral, Daily With Breakfast      hydroxychloroquine 200 MG tablet  Commonly known as: PLAQUENIL   Take 1 tablet by mouth Daily.      prenatal vitamin 27-0.8 27-0.8 MG tablet tablet   Oral, Daily      sertraline 50 MG tablet  Commonly known as: ZOLOFT              Stop These Medications      Bonjesta 20-20 MG tablet controlled-release tablet  Generic drug: doxylamine-pyridoxine ER     cholestyramine 4 g packet  Commonly known as: Questran     heparin (porcine) 08346 UNIT/ML injection     pantoprazole 40 MG EC tablet  Commonly known as: Protonix     ProFe 391.3 (180 Fe) MG capsule  Generic drug: " Polysaccharide Iron Complex     ursodiol 500 MG tablet  Commonly known as: ACTIGALL              Discharge Diet: Regular    Activity at Discharge: Pelvic rest    Follow-up Appointments  Future Appointments   Date Time Provider Department Center   7/26/2024 12:30 PM [facility]   7/26/2024  1:00 PM Tammy Hollins MD [facility]  [facility]         Prenatal labs/vax:   Immunization History   Administered Date(s) Administered    Hepatitis A 05/01/2018    PPD Test 06/27/2018    Tdap 05/17/2024       External Prenatal Results       Pregnancy Outside Results - Transcribed From Office Records - See Scanned Records For Details       Test Value Date Time    ABO  B  07/20/24 0338    Rh  Positive  07/20/24 0338    Antibody Screen  Negative  07/20/24 0338       Negative  06/17/24 1605       Negative  01/09/24 1012    Varicella IgG  709 index 01/09/24 1012    Rubella  1.65 index 01/09/24 1012    Hgb  10.3 g/dL 07/23/24 0543       10.7 g/dL 07/22/24 0632       11.5 g/dL 07/20/24 0338       12.1 g/dL 07/12/24 1216       11.5 g/dL 06/28/24 1232       11.4 g/dL 06/21/24 1022       11.6 g/dL 06/17/24 1605       11.6 g/dL 05/31/24 0920       11.8 g/dL 05/17/24        11.8 g/dL 04/17/24 0944       12.3 g/dL 03/29/24 1721       12.5 g/dL 01/09/24 1012    Hct  30.4 % 07/23/24 0543       31.1 % 07/22/24 0632       33.7 % 07/20/24 0338       36.1 % 07/12/24 1216       33.9 % 06/28/24 1232       34.0 % 06/21/24 1022       34.2 % 06/17/24 1605       35.1 % 05/31/24 0920       34.6 % 05/17/24        34.4 % 04/17/24 0944       36.4 % 03/29/24 1721       37.4 % 01/09/24 1012    HgB A1c   5.3 % 01/09/24 1012    1h GTT  142 mg/dL 05/17/24     3h GTT Fasting  77 mg/dL 05/23/24     3h GTT 1 hour  156 mg/dL 05/23/24     3h GTT 2 hour  91 mg/dL 05/23/24     3h GTT 3 hour   93 mg/dL 05/23/24     Gonorrhea (discrete)  Negative  01/09/24 1221    Chlamydia (discrete)  Negative  01/09/24 1221    RPR  Non Reactive  05/17/24        Non  Reactive  01/09/24 1012    Syphils cascade: TP-Ab (FTA)  Non-Reactive  07/20/24 0338       Non-Reactive  06/17/24 1605    TP-Ab       TP-Ab (EIA)       TPPA       HBsAg  Negative  01/09/24 1012    Herpes Simplex Virus PCR       Herpes Simplex VIrus Culture       HIV  Non Reactive  01/09/24 1012    Hep C RNA Quant PCR       Hep C Antibody  Non Reactive  01/09/24 1012    AFP       NIPT       Cystic Fibroisis        Group B Strep  Negative  07/12/24     GBS Susceptibility to Clindamycin       GBS Susceptibility to Erythromycin       Fetal Fibronectin       Genetic Testing, Maternal Blood                 Drug Screening       Test Value Date Time    Urine Drug Screen       Amphetamine Screen  Negative ng/mL 01/09/24 1221    Barbiturate Screen  Negative ng/mL 01/09/24 1221    Benzodiazepine Screen  Negative ng/mL 01/09/24 1221    Methadone Screen  Negative ng/mL 01/09/24 1221    Phencyclidine Screen  Negative ng/mL 01/09/24 1221    Opiates Screen       THC Screen       Cocaine Screen       Propoxyphene Screen  Negative ng/mL 01/09/24 1221    Buprenorphine Screen       Methamphetamine Screen       Oxycodone Screen       Tricyclic Antidepressants Screen                 Legend    ^: Historical                              Renaldo Partida MD  07/23/24  10:32 EDT

## 2024-07-23 NOTE — LACTATION NOTE
Mom continues to pump and is not getting any milk yet.  Encouraged hydration, pumping every 3hrs and to call for any assistance.

## 2024-07-23 NOTE — PLAN OF CARE
Goal Outcome Evaluation:                 VSS, fundus and lochia WDL, up ad sarahi, voiding without difficulty, pain control with PO meds. DC today. Infant at   Problem: Skin Injury Risk Increased  Goal: Skin Health and Integrity  Outcome: Met  Intervention: Optimize Skin Protection  Description: Perform a full pressure injury risk assessment, as indicated by screening, upon admission to care unit.  Reassess skin (injury risk, full inspection) frequently (e.g., scheduled interval, with change in condition) to provide optimal early detection and prevention.  Maintain adequate tissue perfusion (e.g., encourage fluid balance; avoid crossing legs, constrictive clothing or devices) to promote tissue oxygenation.  Maintain head of bed at lowest degree of elevation tolerated, considering medical condition and other restrictions.  Avoid positioning onto an area that remains reddened.  Minimize incontinence and moisture (e.g., toileting schedule; moisture-wicking pad, diaper or incontinence collection device; skin moisture barrier).  Cleanse skin promptly and gently when soiled utilizing a pH-balanced cleanser.  Relieve and redistribute pressure (e.g., scheduled position changes, weight shifts, use of support surface, medical device repositioning, protective dressing application, use of positioning device, microclimate control, use of pressure-injury-monitor  Encourage increased activity, such as sitting in a chair at the bedside or early mobilization, when able to tolerate.  Recent Flowsheet Documentation  Taken 7/23/2024 0830 by Tari Vanessa, RN  Head of Bed (HOB) Positioning: HOB elevated  Goal: Skin Health and Integrity  Outcome: Met  Intervention: Optimize Skin Protection  Description: Perform a full pressure injury risk assessment, as indicated by screening, upon admission to care unit.  Reassess skin (injury risk, full inspection) frequently (e.g., scheduled interval, with change in condition) to provide optimal early  detection and prevention.  Maintain adequate tissue perfusion (e.g., encourage fluid balance; avoid crossing legs, constrictive clothing or devices) to promote tissue oxygenation.  Maintain head of bed at lowest degree of elevation tolerated, considering medical condition and other restrictions.  Avoid positioning onto an area that remains reddened.  Minimize incontinence and moisture (e.g., toileting schedule; moisture-wicking pad, diaper or incontinence collection device; skin moisture barrier).  Cleanse skin promptly and gently when soiled utilizing a pH-balanced cleanser.  Relieve and redistribute pressure (e.g., scheduled position changes, weight shifts, use of support surface, medical device repositioning, protective dressing application, use of positioning device, microclimate control, use of pressure-injury-monitor  Encourage increased activity, such as sitting in a chair at the bedside or early mobilization, when able to tolerate.  Recent Flowsheet Documentation  Taken 7/23/2024 3930 by Tari Vanessa RN  Head of Bed (HOB) Positioning: HOB elevated     Problem: Pain Acute  Goal: Acceptable Pain Control and Functional Ability  Outcome: Met  Intervention: Prevent or Manage Pain  Description: Evaluate pain level, effect of treatment and patient response at regular intervals.  Minimize painful stimuli; coordinate care and adjust environment (e.g., light, noise, unnecessary movement); promote sleep/rest.  Match pharmacologic analgesia to severity and type of pain mechanism (e.g., neuropathic, muscle, inflammatory); consider multimodal approach (e.g., nonopioid, opioid, adjuvant).  Provide medication at regular intervals; titrate to patient response; premedicate for painful procedures.  Manage breakthrough pain with additional doses; consider rotation or switching medication.  Monitor for signs of substance tolerance (increased dose to reach desired effect, decreased effect with same dose).  Manage  medication-induced effects, such as constipation, nausea, pruritus, urinary retention, somnolence and dizziness.  Provide multimodal interventions, such as as physical activity, therapeutic exercise, yoga, TENS (transcutaneous electrical nerve stimulation) and manual therapy.  Train in functional activity modifications, such as body mechanics, posture, ergonomics, energy conservation and activity pacing.  Consider addition of complementary or alternative therapy, such as acupuncture, hypnosis or therapeutic touch.  Recent Flowsheet Documentation  Taken 7/23/2024 0830 by Tari Vanessa RN  Medication Review/Management: medications reviewed  Intervention: Optimize Psychosocial Wellbeing  Description: Facilitate patient’s self-control over pain by providing pain information and allowing choices in treatment.  Consider and address emotional response to pain.  Explore and promote use of coping strategies; address barriers to successful coping.  Evaluate and assist with psychosocial, cultural and spiritual factors impacting pain.  Modify pain perception using techniques, such as distraction, mindfulness, guided imagery, meditation or music.  Assess for risk factors for developing chronic pain, such as depression, fear, pain avoidance and pain catastrophizing.  Consider referral for ongoing coping support, such as education, relaxation training and role of thoughts.  Recent Flowsheet Documentation  Taken 7/23/2024 0830 by Tari Vanessa RN  Diversional Activities: smartphone     Problem: Adult Inpatient Plan of Care  Goal: Plan of Care Review  Outcome: Met  Goal: Patient-Specific Goal (Individualized)  Outcome: Met  Goal: Absence of Hospital-Acquired Illness or Injury  Outcome: Met  Intervention: Identify and Manage Fall Risk  Description: Perform standard risk assessment on admission using a validated tool or comprehensive approach appropriate to the patient; reassess fall risk frequently, with change in status or  transfer to another level of care.  Communicate fall injury risk to interprofessional healthcare team.  Determine need for increased observation, equipment and environmental modification, such as low bed, signage and supportive, nonskid footwear.  Adjust safety measures to individual developmental age, stage and identified risk factors.  Reinforce the importance of safety and physical activity with patient and family.  Perform regular intentional rounding to assess need for position change, pain assessment and personal needs, including assistance with toileting.  Recent Flowsheet Documentation  Taken 7/23/2024 0830 by Tari Vanessa RN  Safety Promotion/Fall Prevention: safety round/check completed  Intervention: Prevent Skin Injury  Description: Perform a screening for skin injury risk, such as pressure or moisture associated skin damage on admission and at regular intervals throughout hospital stay.  Keep all areas of skin (especially folds) clean and dry.  Maintain adequate skin hydration.  Relieve and redistribute pressure and protect bony prominences; implement measures based on patient-specific risk factors.  Match turning and repositioning schedule to clinical condition.  Encourage weight shift frequently; assist with reposition if unable to complete independently.  Float heels off bed; avoid pressure on the Achilles tendon.  Keep skin free from extended contact with medical devices.  Encourage functional activity and mobility, as early as tolerated.  Use aids (e.g., slide boards, mechanical lift) during transfer.  Recent Flowsheet Documentation  Taken 7/23/2024 0830 by Tari Vanessa RN  Body Position: position changed independently  Intervention: Prevent and Manage VTE (Venous Thromboembolism) Risk  Description: Assess for VTE (venous thromboembolism) risk.  Encourage and assist with early ambulation.  Initiate and maintain compression or other therapy, as indicated, based on identified risk in  accordance with organizational protocol and provider order.  Encourage both active and passive leg exercises while in bed, if unable to ambulate.  Recent Flowsheet Documentation  Taken 2024 by Tari Vanessa RN  Activity Management: up ad sarahi  Intervention: Prevent Infection  Description: Maintain skin and mucous membrane integrity; promote hand, oral and pulmonary hygiene.  Optimize fluid balance, nutrition, sleep and glycemic control to maximize infection resistance.  Identify potential sources of infection early to prevent or mitigate progression of infection (e.g., wound, lines, devices).  Evaluate ongoing need for invasive devices; remove promptly when no longer indicated.  Recent Flowsheet Documentation  Taken 2024 by Tari Vanessa RN  Infection Prevention:   hand hygiene promoted   rest/sleep promoted  Goal: Optimal Comfort and Wellbeing  Outcome: Met  Intervention: Provide Person-Centered Care  Description: Use a family-focused approach to care.  Develop trust and rapport by proactively providing information, encouraging questions, addressing concerns and offering reassurance.  Acknowledge emotional response to hospitalization.  Recognize and utilize personal coping strategies.  Honor spiritual and cultural preferences.  Recent Flowsheet Documentation  Taken 2024 by Tari Vanessa RN  Trust Relationship/Rapport:   care explained   choices provided   questions answered   questions encouraged  Goal: Readiness for Transition of Care  Outcome: Met     Problem: Bleeding (Labor)  Goal: Hemostasis  Outcome: Met     Problem: Change in Fetal Wellbeing (Labor)  Goal: Stable Fetal Wellbeing  Outcome: Met  Intervention: Promote and Monitor Fetal Wellbeing  Description: Evaluate fetal heart rate tracing.  Initiate continuous electronic fetal monitoring if patient is experiencing a TOLAC (trial of labor after ).  Facilitate maternal lateral position change to improve  uteroplacental blood flow and maternal blood pressure; utilize hip wedge as needed.  Monitor uterine contraction pattern; assess for presence of tachysystole.  Prepare to discontinue oxytocin or labor induction agent in the presence of tachysystole, as applicable.  Anticipate need for tocolytic administration if tachysystole persists.  Prepare for intravenous fluid bolus administration to improve maternal fluid status and treat hypotension.  In the presence of maternal hypoxia, prepare to administer oxygen therapy.  Review maternal medication history for possible impact on fetal heart rate tracing (e.g., corticosteroid).  Prepare for additional procedure, as indicated, to improve fetal wellbeing (e.g., amnioinfusion, fetal stimulation).  Modify pushing effort, if in second stage of labor.  Prepare for expedited delivery if fetal status does not improve.  Recent Flowsheet Documentation  Taken 202430 by Tari Vanessa RN  Body Position: position changed independently     Problem: Delayed Labor Progression (Labor)  Goal: Effective Progression to Delivery  Outcome: Met     Problem: Infection (Labor)  Goal: Absence of Infection Signs and Symptoms  Outcome: Met  Intervention: Prevent or Manage Infection  Description: Verify Group B streptococcus status and presence of known infection.  Limit digital vaginal exams or invasive vaginal procedures, especially after membranes are ruptured.  Promote perineal hygiene.  Prepare to administer antimicrobial therapy prophylaxis within 60 minutes prior to  delivery, unless there is current coverage provided by existing antimicrobial therapy regimen. Note: If  delivery is urgently needed, prophylaxis shou  Obtain cultures prior to initiating antimicrobial therapy when possible, if suspected or known infection. Do not delay treatment for laboratory results in the presence of high suspicion or clinical indicators.  Administer ordered antimicrobial therapy  promptly; reassess need regularly.  Provide fever-reduction and comfort measures.  Monitor laboratory value, diagnostic test and clinical status trends for signs of infection progression.  Identify early signs of sepsis, such as increased heart rate and decreased blood pressure, as well as changes in mental state, respiratory pattern or peripheral perfusion.  Prepare for rapid sepsis management, including lactate level, intravenous access, fluid administration and oxygen therapy.  Notify  team for delivery.  Anticipate need for single-dose antimicrobial therapy prophylaxis following anal sphincter injury.  Prepare to send placenta for histology following delivery, as indicated.  Recent Flowsheet Documentation  Taken 2024 0830 by Tari Vanessa RN  Infection Prevention:   hand hygiene promoted   rest/sleep promoted     Problem: Labor Pain (Labor)  Goal: Acceptable Pain Control  Outcome: Met     Problem: Uterine Tachysystole (Labor)  Goal: Normal Uterine Contraction Pattern  Outcome: Met     Problem:  Fall Injury Risk  Goal: Absence of Fall, Infant Drop and Related Injury  Outcome: Met  Intervention: Identify and Manage Contributors  Description: Develop a fall prevention plan with the patient and family.  Promote use of personal vision and auditory aids.  Assess infant location, status and maternal assistance level required for safe and effective self and infant care; provide support for toileting, mobility and placement of infant in crib, as needed.  Define behavior and activity limits to patient and family to decrease fall or drop risk.  If fall occurs, assess the severity of injury; implement fall injury protocol. Determine the cause and revise fall injury prevention plan.  If fall occurs during pregnancy, assess fetal heart rate and movement, presence of uterine contractions or vaginal bleeding; verify membrane status.  Regularly review medication and contribution to fall risk; consider  polypharmacy and high-risk medications that may include neuraxial anesthesia, sedation and narcotic analgesia given within the last 24 hours.  Balance adequate pain management with potential for oversedation.  Recent Flowsheet Documentation  Taken 7/23/2024 0830 by Tari Vanessa RN  Medication Review/Management: medications reviewed  Intervention: Promote Injury-Free Environment  Description: Provide a safe, barrier-free environment that encourages independent activity.  Keep care area uncluttered and well-lighted.  Determine the need for increased observation or monitoring.  Avoid use of devices that minimize mobility, such as restraints or indwelling urinary catheter.  Recent Flowsheet Documentation  Taken 7/23/2024 0830 by Tari Vanessa RN  Safety Promotion/Fall Prevention: safety round/check completed     Problem: Adjustment to Role Transition (Postpartum Vaginal Delivery)  Goal: Successful Maternal Role Transition  Outcome: Met     Problem: Bleeding (Postpartum Vaginal Delivery)  Goal: Hemostasis  Outcome: Met     Problem: Infection (Postpartum Vaginal Delivery)  Goal: Absence of Infection Signs/Symptoms  Outcome: Met  Intervention: Prevent or Manage Infection  Description: Encourage perineal care; if present, monitor episiotomy site for swelling, redness and drainage.  Implement transmission-based precautions and isolation, as indicated, to prevent spread of infection.  Obtain cultures prior to initiating antimicrobial therapy. Do not delay treatment for laboratory results with presence of high suspicion. Note: If endometritis is suspected, treatment may be initiated without obtaining cultures.  Administer ordered antimicrobial therapy promptly; reassess need regularly.  Identify and manage signs of early sepsis, such as increased heart rate and decreased blood pressure, as well as changes in mental state, respiratory pattern or peripheral perfusion.  If perineal wound infection is identified, anticipate  need for suture removal, debridement and cleansing.  Notify infant’s care provider of maternal infection.  Recent Flowsheet Documentation  Taken 7/23/2024 2601 by Tari Vanessa, RN  Perineal Care:   absorbent brief/pad changed   perineum cleansed     Problem: Pain (Postpartum Vaginal Delivery)  Goal: Acceptable Pain Control  Outcome: Met     Problem: Urinary Retention (Postpartum Vaginal Delivery)  Goal: Effective Urinary Elimination  Outcome: Met   [facility].

## 2024-07-23 NOTE — PROGRESS NOTES
Postpartum Vaginal Delivery Note PPD# 2    CC: post vaginal delivery    Subjective:  Baby is downtown at [facility]'s receiving cooling.  MRI is planned after cooling is completed.  Patient is still having some incontinence.  She finds that if she moves she will start to have some leakage.  She can then make it to the bathroom and fully empty her bladder.  She is trying to empty her bladder every 2 hours.  She notes some improvement in control.  She is not having any leg pain.  Her moods are good on Zoloft.  She does note generalized achiness.  She takes Norco 10.  She found the 5 was not enough.  She has allergy to Motrin.  She is ambulating and tolerating a regular diet.  She would like to discharge so that she can go see the baby  ROS: No fever. No N/V. No leg pain.    Vitals:   Patient Vitals for the past 24 hrs:   BP Temp Temp src Pulse Resp   07/23/24 0736 120/81 97.7 °F (36.5 °C) Oral 82 16   07/23/24 0048 117/79 97.6 °F (36.4 °C) Oral 92 16   07/22/24 1516 125/90 97.8 °F (36.6 °C) Oral 89 16         Exam:   Gen: NAD, cooperative, conversive  Resp: Nonlabored breathing  Abd: Soft, nondistended, fundus is firm below umbillicus, not tender  CV: Trace LE edema  Ext: Nontender bilaterally  Labs:  Recent Results (from the past 36 hour(s))   CBC Auto Differential    Collection Time: 07/22/24  6:32 AM    Specimen: Blood   Result Value Ref Range    WBC 32.37 (C) 3.40 - 10.80 10*3/mm3    RBC 3.68 (L) 3.77 - 5.28 10*6/mm3    Hemoglobin 10.7 (L) 12.0 - 15.9 g/dL    Hematocrit 31.1 (L) 34.0 - 46.6 %    MCV 84.5 79.0 - 97.0 fL    MCH 29.1 26.6 - 33.0 pg    MCHC 34.4 31.5 - 35.7 g/dL    RDW 12.2 (L) 12.3 - 15.4 %    RDW-SD 37.0 37.0 - 54.0 fl    MPV 10.2 6.0 - 12.0 fL    Platelets 290 140 - 450 10*3/mm3    nRBC 0.0 0.0 - 0.2 /100 WBC   Manual Differential    Collection Time: 07/22/24  6:32 AM    Specimen: Blood   Result Value Ref Range    Neutrophil % 91.0 (H) 42.7 - 76.0 %    Lymphocyte % 4.0 (L) 19.6 - 45.3 %    Monocyte %  4.0 (L) 5.0 - 12.0 %    Eosinophil % 1.0 0.3 - 6.2 %    Neutrophils Absolute 29.46 (H) 1.70 - 7.00 10*3/mm3    Lymphocytes Absolute 1.29 0.70 - 3.10 10*3/mm3    Monocytes Absolute 1.29 (H) 0.10 - 0.90 10*3/mm3    Eosinophils Absolute 0.32 0.00 - 0.40 10*3/mm3    RBC Morphology Normal Normal    WBC Morphology Normal Normal    Platelet Morphology Normal Normal   Urinalysis With Culture If Indicated - Urine, Clean Catch    Collection Time: 07/22/24  9:10 AM    Specimen: Urine, Clean Catch   Result Value Ref Range    Color, UA Yellow Yellow, Straw    Appearance, UA Clear Clear    pH, UA 6.5 5.0 - 8.0    Specific Gravity, UA 1.007 1.005 - 1.030    Glucose, UA Negative Negative    Ketones, UA Negative Negative    Bilirubin, UA Negative Negative    Blood, UA Large (3+) (A) Negative    Protein, UA Negative Negative    Leuk Esterase, UA Moderate (2+) (A) Negative    Nitrite, UA Negative Negative    Urobilinogen, UA 0.2 E.U./dL 0.2 - 1.0 E.U./dL   Urinalysis, Microscopic Only - Urine, Clean Catch    Collection Time: 07/22/24  9:10 AM    Specimen: Urine, Clean Catch   Result Value Ref Range    RBC, UA Too Numerous to Count (A) None Seen, 0-2 /HPF    WBC, UA 21-50 (A) None Seen, 0-2 /HPF    Bacteria, UA 3+ (A) None Seen /HPF    Squamous Epithelial Cells, UA 0-2 None Seen, 0-2 /HPF    Hyaline Casts, UA None Seen None Seen /LPF    Methodology Automated Microscopy    CBC Auto Differential    Collection Time: 07/23/24  5:43 AM    Specimen: Blood   Result Value Ref Range    WBC 16.67 (H) 3.40 - 10.80 10*3/mm3    RBC 3.54 (L) 3.77 - 5.28 10*6/mm3    Hemoglobin 10.3 (L) 12.0 - 15.9 g/dL    Hematocrit 30.4 (L) 34.0 - 46.6 %    MCV 85.9 79.0 - 97.0 fL    MCH 29.1 26.6 - 33.0 pg    MCHC 33.9 31.5 - 35.7 g/dL    RDW 12.2 (L) 12.3 - 15.4 %    RDW-SD 38.0 37.0 - 54.0 fl    MPV 9.7 6.0 - 12.0 fL    Platelets 263 140 - 450 10*3/mm3    Neutrophil % 73.5 42.7 - 76.0 %    Lymphocyte % 14.5 (L) 19.6 - 45.3 %    Monocyte % 6.3 5.0 - 12.0 %     Eosinophil % 2.3 0.3 - 6.2 %    Basophil % 0.5 0.0 - 1.5 %    Immature Grans % 2.9 (H) 0.0 - 0.5 %    Neutrophils, Absolute 12.25 (H) 1.70 - 7.00 10*3/mm3    Lymphocytes, Absolute 2.41 0.70 - 3.10 10*3/mm3    Monocytes, Absolute 1.05 (H) 0.10 - 0.90 10*3/mm3    Eosinophils, Absolute 0.38 0.00 - 0.40 10*3/mm3    Basophils, Absolute 0.09 0.00 - 0.20 10*3/mm3    Immature Grans, Absolute 0.49 (H) 0.00 - 0.05 10*3/mm3    nRBC 0.0 0.0 - 0.2 /100 WBC   Urine culture is pending        Cholestasis during pregnancy in third trimester    Anxiety    Asthma    Antiphospholipid antibody syndrome complicating pregnancy- cardiolipin AB +    Systemic lupus erythematosus- + anticardio AB, neg SS-A/SS-B    Maternal anemia in pregnancy, antepartum    Abnormal glucose tolerance test (GTT) during pregnancy, antepartum    Obesity in pregnancy, antepartum    Nuchal cord affecting delivery        Assessment and Plan:  Carina Galindo is a 29 y.o. female  s/p   1.  Elevated white blood cell count-White blood cell count has improved from 32.  White blood cell count is now 16.  Patient is afebrile.  Urine culture is pending.  Continue oral antibiotics    2.  Antiphospholipid antibody syndrome and possible lupus-patient will continue Plaquenil, Lovenox and low-dose aspirin    3.  Incontinence-likely due to combination of infection and changes from delivery.  Discussed continuing antibiotics.  Patient may benefit from pelvic floor physical therapy later.    4.  Depression-continue Zoloft    5.  Discharge today.  Recommend pelvic rest for 6 weeks.  Follow-up in 1 week in the office with Dr. Hollins.  Ambulation encouraged.  Discussed signs and symptoms of DVT and encouraged leg stretches.        Signed By:  Renaldo Partida MD       2024 10:02 EDT

## 2024-07-23 NOTE — LACTATION NOTE
P1,36/1. Baby in [facility]. Mother is pumping for the first time and wanted flange size checked. Switched to a 21 mm which is a better fit. Observed session, no drops collected. Note she did not have breast growth during pregnancy. Reviewed pumping regularly 8-10 times per day, cleaning,sterilization bad, methods to collect and safe storage and labeling. Encouraged family to contact [facility] about HGP, LC assistance, bringing in ebm policy.     BF education:   Review PP handbook pages 35-45 when able.   [facility] information  Milk supply typically comes in 3-5 days after delivery  Hydration, nipple care, Lanolin given.   Small amounts of colostrum are normal the first few days.  Pumping bra   Mom has a PBP and  LC number on board.

## 2024-07-23 NOTE — PLAN OF CARE
Goal Outcome Evaluation:  Plan of Care Reviewed With: patient        Progress: improving  Outcome Evaluation: VSS.  Pt up ad sarahi.  Episodes of incontinence, but pt states it is getting a little better.  Fundal assessment and lochia, wnl.

## 2024-07-24 LAB — BACTERIA SPEC AEROBE CULT: ABNORMAL

## 2024-07-24 NOTE — PROGRESS NOTES
Call patient and notify of E. coli on urine culture.  Is she on an antibiotic?  Please let me know as soon as possible.

## 2024-07-26 ENCOUNTER — POSTPARTUM VISIT (OUTPATIENT)
Dept: OBSTETRICS AND GYNECOLOGY | Age: 29
End: 2024-07-26
Payer: COMMERCIAL

## 2024-07-26 VITALS
BODY MASS INDEX: 39.27 KG/M2 | SYSTOLIC BLOOD PRESSURE: 112 MMHG | DIASTOLIC BLOOD PRESSURE: 85 MMHG | WEIGHT: 230 LBS | HEIGHT: 64 IN

## 2024-07-26 DIAGNOSIS — F41.9 ANXIETY: ICD-10-CM

## 2024-07-26 DIAGNOSIS — O99.891 SYSTEMIC LUPUS ERYTHEMATOSUS, MATERNAL, ANTEPARTUM: ICD-10-CM

## 2024-07-26 DIAGNOSIS — M32.9 SYSTEMIC LUPUS ERYTHEMATOSUS, MATERNAL, ANTEPARTUM: ICD-10-CM

## 2024-07-26 PROBLEM — O26.643 INTRAHEPATIC CHOLESTASIS OF PREGNANCY IN THIRD TRIMESTER: Status: RESOLVED | Noted: 2024-04-17 | Resolved: 2024-07-26

## 2024-07-26 PROBLEM — O99.019 MATERNAL ANEMIA IN PREGNANCY, ANTEPARTUM: Status: RESOLVED | Noted: 2024-04-18 | Resolved: 2024-07-26

## 2024-07-26 PROBLEM — O26.643 CHOLESTASIS DURING PREGNANCY IN THIRD TRIMESTER: Status: RESOLVED | Noted: 2024-07-20 | Resolved: 2024-07-26

## 2024-07-26 PROBLEM — O99.210 OBESITY IN PREGNANCY, ANTEPARTUM: Status: RESOLVED | Noted: 2024-06-28 | Resolved: 2024-07-26

## 2024-07-26 PROBLEM — O99.119 ANTIPHOSPHOLIPID ANTIBODY SYNDROME COMPLICATING PREGNANCY: Status: RESOLVED | Noted: 2024-01-16 | Resolved: 2024-07-26

## 2024-07-26 PROBLEM — O99.810 ABNORMAL GLUCOSE TOLERANCE TEST (GTT) DURING PREGNANCY, ANTEPARTUM: Status: RESOLVED | Noted: 2024-05-21 | Resolved: 2024-07-26

## 2024-07-26 PROBLEM — D68.61 ANTIPHOSPHOLIPID ANTIBODY SYNDROME COMPLICATING PREGNANCY: Status: RESOLVED | Noted: 2024-01-16 | Resolved: 2024-07-26

## 2024-07-26 PROBLEM — Z34.90 PREGNANCY: Status: RESOLVED | Noted: 2024-06-17 | Resolved: 2024-07-26

## 2024-07-26 RX ORDER — HYDROCODONE BITARTRATE AND ACETAMINOPHEN 10; 325 MG/1; MG/1
1 TABLET ORAL EVERY 4 HOURS PRN
Qty: 15 TABLET | Refills: 0 | Status: SHIPPED | OUTPATIENT
Start: 2024-07-26 | End: 2024-07-29

## 2024-07-26 NOTE — PROGRESS NOTES
"Socorro Galindo is a 29 y.o. female who presents for a postpartum visit. She is 5 days postpartum following a low forceps vaginal delivery. I have fully reviewed the prenatal and intrapartum course. The delivery was at 36-1 gestational weeks. Outcome: forceps, low. Anesthesia: epidural. Postpartum course has been uncomplicated. Baby's course has been c/b NICU stay . Baby is feeding by bottle. Bleeding thin lochia. Bowel function is normal. Bladder function is normal. Patient is not sexually active. Contraception method is  undecided  . Postpartum depression screening: negative.    The following portions of the patient's history were reviewed and updated as appropriate: allergies, current medications, past family history, past medical history, past social history, past surgical history, and problem list.    Review of Systems  Pertinent items are noted in HPI.    Objective   /85   Ht 162.6 cm (64\")   Wt 104 kg (230 lb)   LMP 04/28/2023   Breastfeeding Yes   BMI 39.48 kg/m²    General:  alert, appears stated age, and cooperative    Vulva:  not evaluated   Vagina: not evaluated   Assessment & Plan   postpartum exam.     1. Contraception:  undecided   2. Cont Zoloft   3. Follow up in: 2 weeks or as needed.           "

## 2024-07-28 ENCOUNTER — HOSPITAL ENCOUNTER (EMERGENCY)
Facility: HOSPITAL | Age: 29
Discharge: HOME | End: 2024-07-28
Attending: OBSTETRICS & GYNECOLOGY | Admitting: OBSTETRICS & GYNECOLOGY
Payer: COMMERCIAL

## 2024-07-28 VITALS
RESPIRATION RATE: 20 BRPM | TEMPERATURE: 98.4 F | HEART RATE: 84 BPM | WEIGHT: 222.6 LBS | BODY MASS INDEX: 38.21 KG/M2 | DIASTOLIC BLOOD PRESSURE: 82 MMHG | SYSTOLIC BLOOD PRESSURE: 128 MMHG

## 2024-07-28 LAB
ALBUMIN SERPL-MCNC: 3.8 G/DL (ref 3.5–5.2)
ALBUMIN/GLOB SERPL: 1.1 G/DL
ALP SERPL-CCNC: 197 U/L (ref 39–117)
ALT SERPL W P-5'-P-CCNC: 24 U/L (ref 1–33)
ANION GAP SERPL CALCULATED.3IONS-SCNC: 12.5 MMOL/L (ref 5–15)
AST SERPL-CCNC: 24 U/L (ref 1–32)
BACTERIA UR QL AUTO: ABNORMAL /HPF
BASOPHILS # BLD AUTO: 0.07 10*3/MM3 (ref 0–0.2)
BASOPHILS NFR BLD AUTO: 0.6 % (ref 0–1.5)
BILIRUB SERPL-MCNC: <0.2 MG/DL (ref 0–1.2)
BILIRUB UR QL STRIP: NEGATIVE
BUN SERPL-MCNC: 8 MG/DL (ref 6–20)
BUN/CREAT SERPL: 12.3 (ref 7–25)
CALCIUM SPEC-SCNC: 8.9 MG/DL (ref 8.6–10.5)
CHLORIDE SERPL-SCNC: 104 MMOL/L (ref 98–107)
CLARITY UR: ABNORMAL
CO2 SERPL-SCNC: 21.5 MMOL/L (ref 22–29)
COLOR UR: ABNORMAL
CREAT SERPL-MCNC: 0.65 MG/DL (ref 0.57–1)
CREAT UR-MCNC: 46.6 MG/DL
DEPRECATED RDW RBC AUTO: 39 FL (ref 37–54)
EGFRCR SERPLBLD CKD-EPI 2021: 120.9 ML/MIN/1.73
EOSINOPHIL # BLD AUTO: 0.28 10*3/MM3 (ref 0–0.4)
EOSINOPHIL NFR BLD AUTO: 2.3 % (ref 0.3–6.2)
ERYTHROCYTE [DISTWIDTH] IN BLOOD BY AUTOMATED COUNT: 12.1 % (ref 12.3–15.4)
GLOBULIN UR ELPH-MCNC: 3.5 GM/DL
GLUCOSE SERPL-MCNC: 82 MG/DL (ref 65–99)
GLUCOSE UR STRIP-MCNC: NEGATIVE MG/DL
HCT VFR BLD AUTO: 34.8 % (ref 34–46.6)
HGB BLD-MCNC: 11.4 G/DL (ref 12–15.9)
HGB UR QL STRIP.AUTO: ABNORMAL
HYALINE CASTS UR QL AUTO: ABNORMAL /LPF
IMM GRANULOCYTES # BLD AUTO: 0.34 10*3/MM3 (ref 0–0.05)
IMM GRANULOCYTES NFR BLD AUTO: 2.8 % (ref 0–0.5)
KETONES UR QL STRIP: NEGATIVE
LEUKOCYTE ESTERASE UR QL STRIP.AUTO: ABNORMAL
LYMPHOCYTES # BLD AUTO: 2.17 10*3/MM3 (ref 0.7–3.1)
LYMPHOCYTES NFR BLD AUTO: 17.8 % (ref 19.6–45.3)
MCH RBC QN AUTO: 28.6 PG (ref 26.6–33)
MCHC RBC AUTO-ENTMCNC: 32.8 G/DL (ref 31.5–35.7)
MCV RBC AUTO: 87.2 FL (ref 79–97)
MONOCYTES # BLD AUTO: 0.76 10*3/MM3 (ref 0.1–0.9)
MONOCYTES NFR BLD AUTO: 6.2 % (ref 5–12)
NEUTROPHILS NFR BLD AUTO: 70.3 % (ref 42.7–76)
NEUTROPHILS NFR BLD AUTO: 8.59 10*3/MM3 (ref 1.7–7)
NITRITE UR QL STRIP: NEGATIVE
NRBC BLD AUTO-RTO: 0 /100 WBC (ref 0–0.2)
PH UR STRIP.AUTO: 7 [PH] (ref 5–8)
PLATELET # BLD AUTO: 391 10*3/MM3 (ref 140–450)
PMV BLD AUTO: 8.7 FL (ref 6–12)
POTASSIUM SERPL-SCNC: 3.7 MMOL/L (ref 3.5–5.2)
PROT ?TM UR-MCNC: 22.1 MG/DL
PROT SERPL-MCNC: 7.3 G/DL (ref 6–8.5)
PROT UR QL STRIP: ABNORMAL
PROT/CREAT UR: 474.2 MG/G CREA (ref 0–200)
RBC # BLD AUTO: 3.99 10*6/MM3 (ref 3.77–5.28)
RBC # UR STRIP: ABNORMAL /HPF
REF LAB TEST METHOD: ABNORMAL
SODIUM SERPL-SCNC: 138 MMOL/L (ref 136–145)
SP GR UR STRIP: 1.01 (ref 1–1.03)
SQUAMOUS #/AREA URNS HPF: ABNORMAL /HPF
UROBILINOGEN UR QL STRIP: ABNORMAL
WBC # UR STRIP: ABNORMAL /HPF
WBC NRBC COR # BLD AUTO: 12.21 10*3/MM3 (ref 3.4–10.8)

## 2024-07-28 PROCEDURE — 80053 COMPREHEN METABOLIC PANEL: CPT | Performed by: OBSTETRICS & GYNECOLOGY

## 2024-07-28 PROCEDURE — 85025 COMPLETE CBC W/AUTO DIFF WBC: CPT | Performed by: OBSTETRICS & GYNECOLOGY

## 2024-07-28 PROCEDURE — 82570 ASSAY OF URINE CREATININE: CPT | Performed by: OBSTETRICS & GYNECOLOGY

## 2024-07-28 PROCEDURE — 81001 URINALYSIS AUTO W/SCOPE: CPT | Performed by: OBSTETRICS & GYNECOLOGY

## 2024-07-28 PROCEDURE — 25810000003 LACTATED RINGERS SOLUTION: Performed by: OBSTETRICS & GYNECOLOGY

## 2024-07-28 PROCEDURE — 84156 ASSAY OF PROTEIN URINE: CPT | Performed by: OBSTETRICS & GYNECOLOGY

## 2024-07-28 PROCEDURE — 87086 URINE CULTURE/COLONY COUNT: CPT | Performed by: OBSTETRICS & GYNECOLOGY

## 2024-07-28 PROCEDURE — 99283 EMERGENCY DEPT VISIT LOW MDM: CPT | Performed by: OBSTETRICS & GYNECOLOGY

## 2024-07-28 RX ORDER — SODIUM CHLORIDE 0.9 % (FLUSH) 0.9 %
10 SYRINGE (ML) INJECTION AS NEEDED
Status: DISCONTINUED | OUTPATIENT
Start: 2024-07-28 | End: 2024-07-29 | Stop reason: HOSPADM

## 2024-07-28 RX ORDER — SODIUM CHLORIDE 0.9 % (FLUSH) 0.9 %
10 SYRINGE (ML) INJECTION EVERY 12 HOURS SCHEDULED
Status: DISCONTINUED | OUTPATIENT
Start: 2024-07-28 | End: 2024-07-29 | Stop reason: HOSPADM

## 2024-07-28 RX ORDER — SODIUM CHLORIDE 9 MG/ML
40 INJECTION, SOLUTION INTRAVENOUS AS NEEDED
Status: DISCONTINUED | OUTPATIENT
Start: 2024-07-28 | End: 2024-07-29 | Stop reason: HOSPADM

## 2024-07-28 RX ORDER — BUTALBITAL, ACETAMINOPHEN AND CAFFEINE 50; 325; 40 MG/1; MG/1; MG/1
2 TABLET ORAL EVERY 4 HOURS PRN
Status: DISCONTINUED | OUTPATIENT
Start: 2024-07-28 | End: 2024-07-29 | Stop reason: HOSPADM

## 2024-07-28 RX ADMIN — BUTALBITAL, ACETAMINOPHEN, AND CAFFEINE 2 TABLET: 50; 325; 40 TABLET ORAL at 21:59

## 2024-07-28 RX ADMIN — SODIUM CHLORIDE, POTASSIUM CHLORIDE, SODIUM LACTATE AND CALCIUM CHLORIDE 500 ML: 600; 310; 30; 20 INJECTION, SOLUTION INTRAVENOUS at 21:01

## 2024-07-28 NOTE — OBED NOTES
LYNDA Note OB        Patient Name: Carina Galindo  YOB: 1995  MRN: [id number]  Admission Date: 2024  7:21 PM  Date of Service: 2024    Chief Complaint: Headache and Elevated Blood Pressure (OB ED-Pt c/o headache unrelieved by meds.  S/p delivery 1 week BP reading at home of 126/104.  Swelling in feet.)        Subjective     Carina Galindo is a 29 y.o. female  PPD #7  after low forceps vaginal delivery who presents with the chief complaint of a headache and elevated blood pressure at home.  Her blood pressure at home was 120/100s.  She has already taken a Norco for her headache.  She sees Tammy Hollins MD for her prenatal care. Her pregnancy has been complicated by:  SLE, asthma, anxiety, recent UTI.    She denies chest pain, shortness of breath or upper abdominal pain.  She has constipation.  She reports low back pain and bilateral flank pain.  She has minimal to moderate bright red lochia.            Objective   Patient Active Problem List    Diagnosis     Systemic lupus erythematosus- + anticardio AB, neg SS-A/SS-B [O99.891, M32.9]     Anxiety [F41.9]     Asthma [J45.909]         OB History    Para Term  AB Living   2 1 0 1 0 1   SAB IAB Ectopic Molar Multiple Live Births   0 0 0 0 0 1      # Outcome Date GA Lbr Jah/2nd Weight Sex Type Anes PTL Lv   2 Current            1  24 36w1d 11:03 / 09:48 3080 g (6 lb 12.6 oz) M Vag-Forceps EPI N LLOYD      Birth Comments: Apgars 0, 7 after forceps delivery with tight double nuchal - infant to [facility] for cooling      Complications: Fetal Intolerance, Meconium stained amniotic fluid      Name: Allen Wells      Apgar1: 0  Apgar5: 5        Past Medical History:   Diagnosis Date    Antiphospholipid syndrome     Anxiety     exercise induced asthma     GERD (gastroesophageal reflux disease)     Iron deficiency anemia     Knee meniscus pain, left     Liver cyst     BX AND FOUND TO BE NEGATIVE    Lupus      OCD (obsessive compulsive disorder)     Rheumatoid factor positive        Past Surgical History:   Procedure Laterality Date    ADENOIDECTOMY      CHOLECYSTECTOMY WITH INTRAOPERATIVE CHOLANGIOGRAM N/A 07/14/2016    Procedure: CHOLECYSTECTOMY LAPAROSCOPIC INTRAOPERATIVE CHOLANGIOGRAM;  Surgeon: Malathi Lozano MD;  Location: [facility] OR [facility] [place];  Service:     COLONOSCOPY  2010    ENDOSCOPY  11/01/2013    Gastritis     KNEE ARTHROSCOPY Left 3/28/2023    Procedure: Knee Arthroscopy with lateral release, meniscectomy and cyst excision;  Surgeon: Tapan Craig MD;  Location: [facility] OR [facility] [place];  Service: Orthopedics;  Laterality: Left;    NASAL SEPTUM SURGERY      SHOULDER SURGERY Right 2016    TONSILLECTOMY      x 2 due to regrowth    UPPER GASTROINTESTINAL ENDOSCOPY  2010    WISDOM TOOTH EXTRACTION         No current facility-administered medications on file prior to encounter.     Current Outpatient Medications on File Prior to Encounter   Medication Sig Dispense Refill    acetaminophen (TYLENOL) 325 MG tablet Take 1 tablet by mouth Every 6 (Six) Hours As Needed.      albuterol (VENTOLIN HFA) 108 (90 Base) MCG/ACT inhaler Inhale 2 puffs Every 4 (Four) Hours As Needed for Wheezing. 18 g 1    Alcohol Swabs 70 % pads Use 1 Swab Daily. 100 each 11    aspirin (ASPIR) 81 MG EC tablet Take one tablet by mouth daily until 36 weeks gestation. 90 tablet 3    benzocaine-menthol (DERMOPLAST) 20-0.5 % aerosol topical spray Apply  topically to the appropriate area as directed As Needed for Mild Pain (perineal pain). 56 g 0    cetirizine (zyrTEC) 10 MG tablet Take 1 tablet by mouth Daily.      diphenhydrAMINE (BENADRYL) 25 mg capsule Take 1 capsule by mouth Every 6 (Six) Hours As Needed for Itching.      Enoxaparin Sodium (LOVENOX) 40 MG/0.4ML solution prefilled syringe syringe Inject 0.4 mL under the skin into the appropriate area as directed Daily. 12 mL 11    Enoxaparin Sodium (LOVENOX) 40 MG/0.4ML solution prefilled syringe syringe  "Inject 0.4 mL under the skin into the appropriate area as directed Daily. Indications: Prevention of Unwanted Clot in Veins 12 mL 2    EPINEPHrine (EPIPEN) 0.3 MG/0.3ML solution auto-injector injection       ferrous sulfate 325 (65 FE) MG tablet Take 1 tablet by mouth Daily With Breakfast. 90 tablet 2    HYDROcodone-acetaminophen (NORCO)  MG per tablet Take 1 tablet by mouth Every 4 (Four) Hours As Needed for Severe Pain for up to 3 days. 15 tablet 0    hydroxychloroquine (PLAQUENIL) 200 MG tablet Take 1 tablet by mouth Daily.      Prenatal Vit-Fe Fumarate-FA (prenatal vitamin 27-0.8) 27-0.8 MG tablet tablet Take  by mouth Daily.      sertraline (ZOLOFT) 50 MG tablet       Syringe/Needle, Disp, (BD Eclipse Syringe/Needle) 25G X 5/8\" 3 ML misc Use to inject heparin 84 each 0       Allergies   Allergen Reactions    Egg-Derived Products Swelling     Eyes swell shut, has some eggs cooked in food.    Other Other (See Comments)     FOOD ALLERGIES: see list : peanuts and ranch dressing      Shellfish-Derived Products Anaphylaxis    Nitrofurantoin Unknown (See Comments) and Rash     Unsure    Unsure    Peanut Butter Flavor [Flavoring Agent] Hives    Ciprofloxacin Rash    Erythromycin Rash     As a baby, no interaction since    Ibuprofen Rash     Has tolerated low dose in the past    Multihance [Gadobenate] Hives     Patient experienced one small hive on neck. Was instructed to be pre-medicated per radiologist prior to MRI's for future.     Sulfa Antibiotics Rash    Triamcinolone Rash       Family History   Problem Relation Age of Onset    Diabetes Father     Gallbladder disease Mother 30    Rheum arthritis Mother     Hypertension Mother     Endometriosis Mother     Irritable bowel syndrome Mother     Pancreatitis Mother     Breast cancer Maternal Grandmother     Heart disease Maternal Grandfather     Breast cancer Maternal Aunt     Congenital heart disease Cousin     Malig Hyperthermia Neg Hx     Deep vein thrombosis " Neg Hx     Pulmonary embolism Neg Hx        Social History     Socioeconomic History    Marital status:     Number of children: 0   Tobacco Use    Smoking status: Never     Passive exposure: Never    Smokeless tobacco: Never   Vaping Use    Vaping status: Never Used   Substance and Sexual Activity    Alcohol use: No    Drug use: Never    Sexual activity: Yes     Partners: Male     Birth control/protection: None           Review of Systems   Constitutional: Negative.    Respiratory: Negative.     Cardiovascular: Negative.    Gastrointestinal:  Positive for constipation and nausea. Negative for abdominal pain.   Genitourinary:  Positive for frequency and vaginal bleeding. Negative for dysuria.   Musculoskeletal:  Positive for back pain.   Neurological:  Positive for headaches.          PHYSICAL EXAM:      VITAL SIGNS:  Vitals:    07/28/24 1935 07/28/24 2020 07/28/24 2030 07/28/24 2120   BP: 131/86 127/84 130/79 128/82   Pulse: 82 91 87 84   Resp:       Temp:       TempSrc:       Weight:                PHYSICAL EXAM:    General: well developed; well nourished  no acute distress   Heart: Not performed.   Lungs  : breathing is unlabored     Abdomen: soft, non-tender; no masses       Pelvic: deferred      Back: Bilateral CVA tenderness, R>L        Extremities: pedal edema 2 +      LABS AND TESTING ORDERED:  Uterine and fetal monitoring  Urinalysis  CBC, CMP    LAB RESULTS:    Recent Results (from the past 24 hour(s))   Urinalysis With Culture If Indicated - Urine, Clean Catch    Collection Time: 07/28/24  7:40 PM    Specimen: Urine, Clean Catch   Result Value Ref Range    Color, UA Straw Yellow, Straw    Appearance, UA Cloudy (A) Clear    pH, UA 7.0 5.0 - 8.0    Specific Gravity, UA 1.010 1.005 - 1.030    Glucose, UA Negative Negative    Ketones, UA Negative Negative    Bilirubin, UA Negative Negative    Blood, UA Large (3+) (A) Negative    Protein, UA 30 mg/dL (1+) (A) Negative    Leuk Esterase, UA Large (3+) (A)  Negative    Nitrite, UA Negative Negative    Urobilinogen, UA 0.2 E.U./dL 0.2 - 1.0 E.U./dL   Urinalysis, Microscopic Only - Urine, Clean Catch    Collection Time: 07/28/24  7:40 PM    Specimen: Urine, Clean Catch   Result Value Ref Range    RBC, UA Too Numerous to Count (A) None Seen, 0-2 /HPF    WBC, UA Too Numerous to Count (A) None Seen, 0-2 /HPF    Bacteria, UA None Seen None Seen /HPF    Squamous Epithelial Cells, UA 0-2 None Seen, 0-2 /HPF    Hyaline Casts, UA 0-2 None Seen /LPF    Methodology Automated Microscopy    Comprehensive Metabolic Panel    Collection Time: 07/28/24  7:54 PM    Specimen: Blood   Result Value Ref Range    Glucose 82 65 - 99 mg/dL    BUN 8 6 - 20 mg/dL    Creatinine 0.65 0.57 - 1.00 mg/dL    Sodium 138 136 - 145 mmol/L    Potassium 3.7 3.5 - 5.2 mmol/L    Chloride 104 98 - 107 mmol/L    CO2 21.5 (L) 22.0 - 29.0 mmol/L    Calcium 8.9 8.6 - 10.5 mg/dL    Total Protein 7.3 6.0 - 8.5 g/dL    Albumin 3.8 3.5 - 5.2 g/dL    ALT (SGPT) 24 1 - 33 U/L    AST (SGOT) 24 1 - 32 U/L    Alkaline Phosphatase 197 (H) 39 - 117 U/L    Total Bilirubin <0.2 0.0 - 1.2 mg/dL    Globulin 3.5 gm/dL    A/G Ratio 1.1 g/dL    BUN/Creatinine Ratio 12.3 7.0 - 25.0    Anion Gap 12.5 5.0 - 15.0 mmol/L    eGFR 120.9 >60.0 mL/min/1.73   CBC Auto Differential    Collection Time: 07/28/24  7:54 PM    Specimen: Blood   Result Value Ref Range    WBC 12.21 (H) 3.40 - 10.80 10*3/mm3    RBC 3.99 3.77 - 5.28 10*6/mm3    Hemoglobin 11.4 (L) 12.0 - 15.9 g/dL    Hematocrit 34.8 34.0 - 46.6 %    MCV 87.2 79.0 - 97.0 fL    MCH 28.6 26.6 - 33.0 pg    MCHC 32.8 31.5 - 35.7 g/dL    RDW 12.1 (L) 12.3 - 15.4 %    RDW-SD 39.0 37.0 - 54.0 fl    MPV 8.7 6.0 - 12.0 fL    Platelets 391 140 - 450 10*3/mm3    Neutrophil % 70.3 42.7 - 76.0 %    Lymphocyte % 17.8 (L) 19.6 - 45.3 %    Monocyte % 6.2 5.0 - 12.0 %    Eosinophil % 2.3 0.3 - 6.2 %    Basophil % 0.6 0.0 - 1.5 %    Immature Grans % 2.8 (H) 0.0 - 0.5 %    Neutrophils, Absolute 8.59 (H)  1.70 - 7.00 10*3/mm3    Lymphocytes, Absolute 2.17 0.70 - 3.10 10*3/mm3    Monocytes, Absolute 0.76 0.10 - 0.90 10*3/mm3    Eosinophils, Absolute 0.28 0.00 - 0.40 10*3/mm3    Basophils, Absolute 0.07 0.00 - 0.20 10*3/mm3    Immature Grans, Absolute 0.34 (H) 0.00 - 0.05 10*3/mm3    nRBC 0.0 0.0 - 0.2 /100 WBC       Lab Results   Component Value Date    ABO B 2024    RH Positive 2024       Lab Results   Component Value Date    STREPGPB Negative 2024                 Assessment & Plan     ASSESSMENT/PLAN:  Carina Galindo is a 29 y.o. female  who presents with:    Headache  Elevated blood pressures at home      PLAN:     CBC and CMP are unremarkable  Patient had recent E. Coli UTI.  Urine culture is pending.  Patient normotensive in [place] by serial blood pressure readings  Patient was given fioricet and discharged home.  She will return if her symptoms worsen of if her elevated blood pressures persist.    I have spent 30 minutes including face to face time with the patient, greater than 50% in discussion of the diagnosis (counseling) and/or coordination of care.     Emilie Fraire MD  2024  22:05 EDT  OB Hospitalist  Phone:  [phone]

## 2024-07-30 LAB — BACTERIA SPEC AEROBE CULT: NO GROWTH

## 2024-08-05 ENCOUNTER — POSTPARTUM VISIT (OUTPATIENT)
Dept: OBSTETRICS AND GYNECOLOGY | Age: 29
End: 2024-08-05
Payer: COMMERCIAL

## 2024-08-05 VITALS
WEIGHT: 217 LBS | DIASTOLIC BLOOD PRESSURE: 74 MMHG | BODY MASS INDEX: 37.05 KG/M2 | HEIGHT: 64 IN | SYSTOLIC BLOOD PRESSURE: 110 MMHG

## 2024-08-05 DIAGNOSIS — F41.9 ANXIETY: ICD-10-CM

## 2024-08-05 PROCEDURE — 0503F POSTPARTUM CARE VISIT: CPT | Performed by: OBSTETRICS & GYNECOLOGY

## 2024-08-05 NOTE — PROGRESS NOTES
"Socorro Galindo is a 29 y.o. female who presents for a postpartum visit. She is 2 weeks postpartum following a spontaneous vaginal delivery. I have fully reviewed the prenatal and intrapartum course. The delivery was at 36-1 gestational weeks. Outcome: forceps, outlet. Anesthesia: epidural. Postpartum course has been c/p Migraine . Baby's course has been c/b NICU stay. . Baby is feeding by bottle. Bleeding no bleeding. Bowel function is normal. Bladder function is normal. Patient is not sexually active. Contraception method is  undecided . Postpartum depression screening: negative.    The following portions of the patient's history were reviewed and updated as appropriate: allergies, current medications, past family history, past medical history, past social history, past surgical history, and problem list.    Review of Systems  Pertinent items are noted in HPI.    Objective   /74   Ht 162.6 cm (64\")   Wt 98.4 kg (217 lb)   LMP 04/28/2023 (Exact Date)   Breastfeeding No   BMI 37.25 kg/m²    General:  alert, appears stated age, and cooperative    Vulva:  not evaluated   Vagina: not evaluated   Assessment & Plan   postpartum exam. Pap smear not done at today's visit.    1. Contraception:  undecided  2. Anxiety - stable on Zoloft   3. Follow up in: 4 weeks or as needed.           "

## 2024-09-04 ENCOUNTER — POSTPARTUM VISIT (OUTPATIENT)
Dept: OBSTETRICS AND GYNECOLOGY | Age: 29
End: 2024-09-04
Payer: COMMERCIAL

## 2024-09-04 VITALS
BODY MASS INDEX: 37.39 KG/M2 | DIASTOLIC BLOOD PRESSURE: 80 MMHG | HEIGHT: 64 IN | WEIGHT: 219 LBS | SYSTOLIC BLOOD PRESSURE: 110 MMHG

## 2024-09-04 DIAGNOSIS — D50.9 IRON DEFICIENCY ANEMIA, UNSPECIFIED IRON DEFICIENCY ANEMIA TYPE: ICD-10-CM

## 2024-09-04 DIAGNOSIS — F41.9 ANXIETY: ICD-10-CM

## 2024-09-04 DIAGNOSIS — Z30.011 ENCOUNTER FOR INITIAL PRESCRIPTION OF CONTRACEPTIVE PILLS: ICD-10-CM

## 2024-09-04 RX ORDER — IRON POLYSACCHARIDE COMPLEX 180 MG
1 CAPSULE ORAL DAILY
Qty: 90 EACH | Refills: 3 | Status: SHIPPED | OUTPATIENT
Start: 2024-09-04

## 2024-09-04 RX ORDER — ACETAMINOPHEN AND CODEINE PHOSPHATE 120; 12 MG/5ML; MG/5ML
1 SOLUTION ORAL DAILY
Qty: 84 TABLET | Refills: 3 | Status: SHIPPED | OUTPATIENT
Start: 2024-09-04 | End: 2025-09-04

## 2024-09-04 NOTE — PROGRESS NOTES
"Socorro Galindo is a 29 y.o. female who presents for a postpartum visit. She is 6 weeks postpartum following a spontaneous vaginal delivery. I have fully reviewed the prenatal and intrapartum course. The delivery was at 36-1 gestational weeks. Outcome: forceps, outlet. Anesthesia: epidural. Postpartum course has been uncomplicated. Baby's course has been c/b NICU stay Baby is feeding by bottle. Bleeding no bleeding. Bowel function is normal. Bladder function is normal. Patient is not sexually active. Contraception method is oral progesterone-only contraceptive. Postpartum depression screening: negative.    The following portions of the patient's history were reviewed and updated as appropriate: allergies, current medications, past family history, past medical history, past social history, past surgical history, and problem list.    Review of Systems  Pertinent items are noted in HPI.    Objective   /80   Ht 162.6 cm (64\")   Wt 99.3 kg (219 lb)   LMP 07/21/2023 (Exact Date)   Breastfeeding No   BMI 37.59 kg/m²    General:  alert, appears stated age, and cooperative    Vulva:  normal   Vagina: normal vagina, no discharge, exudate, lesion, or erythema   Assessment & Plan   postpartum exam. Pap smear not done at today's visit.    1. Contraception: oral progesterone-only contraceptive  2. Anxiety- stable on Zoloft   3. Follow up in: 4 months or as needed.           "

## 2024-10-09 ENCOUNTER — TELEPHONE (OUTPATIENT)
Dept: OBSTETRICS AND GYNECOLOGY | Age: 29
End: 2024-10-09
Payer: COMMERCIAL

## 2024-10-09 NOTE — TELEPHONE ENCOUNTER
It can can be but can we get her in for a TVUS at her convenience and I will call her with results.

## 2024-10-09 NOTE — TELEPHONE ENCOUNTER
----- Message from Nurys STEPHEN sent at 10/9/2024  8:08 AM EDT -----  Regarding: FW: Bleeding  Contact: [phone]    ----- Message -----  From: Carina Galindo  Sent: 10/9/2024   7:09 AM EDT  To: [facility]  Subject: Bleeding                                         I am having random bleeding. Sometimes it’s enough to be a period and will last a few days, and other times it’s just spotting. Is this normal?

## 2024-10-14 ENCOUNTER — TELEPHONE (OUTPATIENT)
Dept: OBSTETRICS AND GYNECOLOGY | Age: 29
End: 2024-10-14
Payer: COMMERCIAL

## 2024-10-14 NOTE — TELEPHONE ENCOUNTER
Pt called stating she had an ultrasound on Friday 10/11/2024 and is wanting a call back about the results

## 2024-10-15 ENCOUNTER — TELEPHONE (OUTPATIENT)
Dept: OBSTETRICS AND GYNECOLOGY | Age: 29
End: 2024-10-15
Payer: COMMERCIAL

## 2024-10-15 NOTE — TELEPHONE ENCOUNTER
Please let patient know I am out of town and sorry for the delay. US is essentially normal. Please make an appt for her if she would like to see me when I return.

## 2024-10-15 NOTE — TELEPHONE ENCOUNTER
Spoke with patient , she is aware  out of the office . Will call her back as soon I get a message from her regarding US results . Patient agrees with the plan .

## 2024-11-20 ENCOUNTER — TELEPHONE (OUTPATIENT)
Dept: OBSTETRICS AND GYNECOLOGY | Age: 29
End: 2024-11-20
Payer: COMMERCIAL

## 2025-06-09 NOTE — TELEPHONE ENCOUNTER
No outpatient medications have been marked as taking for the 6/9/25 encounter (Refill) with Vladimir Cedillo MD.     Last Visit:12/06/24  Next Visit: 9/29/2025    Last ov note:      Medication passed protocol.     Medication: lamotrigine passed protocol.       Refilled per [facility].       Your recent labwork was normal.

## 2025-07-25 ENCOUNTER — OFFICE VISIT (OUTPATIENT)
Dept: OBSTETRICS AND GYNECOLOGY | Age: 30
End: 2025-07-25
Payer: COMMERCIAL

## 2025-07-25 VITALS
HEIGHT: 64 IN | SYSTOLIC BLOOD PRESSURE: 118 MMHG | DIASTOLIC BLOOD PRESSURE: 84 MMHG | WEIGHT: 242.4 LBS | BODY MASS INDEX: 41.38 KG/M2

## 2025-07-25 DIAGNOSIS — R10.2 VAGINAL PAIN: Primary | ICD-10-CM

## 2025-07-25 DIAGNOSIS — N94.10 FEMALE DYSPAREUNIA: ICD-10-CM

## 2025-07-25 DIAGNOSIS — N90.7 LABIAL CYST: ICD-10-CM

## 2025-07-25 RX ORDER — ALBUTEROL SULFATE 90 UG/1
INHALANT RESPIRATORY (INHALATION)
COMMUNITY

## 2025-07-25 NOTE — PROGRESS NOTES
"Socorro Galindo is a 30 y.o. female is being seen today for   Chief Complaint   Patient presents with    Follow-up     CC: pt complains of vaginal cyst, first formed the 20th, popped yesterday.    .    History of Present Illness    Patient of Dr Hollins  Complains of labial cyst on left   Noticed it on the 20th, it was quarter size and painful  She did warm soaks with epson salt  Yesterday it spontaneously drained  No pain or drainage now but wanted to have it looked at    Also wanted to discuss vaginal pain with tampon insertion that she has had since she delivered her son  Also intermittent dyspareunia, it is not every time    The following portions of the patient's history were reviewed and updated as appropriate: allergies, current medications, past family history, past medical history, past social history, past surgical history and problem list.    /84   Ht 162.6 cm (64\")   Wt 110 kg (242 lb 6.4 oz)   LMP 07/09/2025 (Exact Date)   BMI 41.61 kg/m²         Review of Systems   Constitutional: Negative.    HENT: Negative.     Eyes: Negative.    Respiratory: Negative.     Cardiovascular: Negative.    Gastrointestinal: Negative.    Endocrine: Negative.    Genitourinary:  Positive for vaginal pain.   Musculoskeletal: Negative.    Skin: Negative.    Allergic/Immunologic: Negative.    Neurological: Negative.    Hematological: Negative.    Psychiatric/Behavioral: Negative.         Objective   Physical Exam  Constitutional:       Appearance: She is well-developed.   Genitourinary:     Vagina: Normal.      Cervix: No cervical motion tenderness, discharge or friability.      Uterus: Not tender.    Skin:     General: Skin is warm and dry.   Neurological:      Mental Status: She is alert and oriented to person, place, and time.         Assessment & Plan   Diagnoses and all orders for this visit:    1. Vaginal pain (Primary)    2. Female dyspareunia  -     Ambulatory Referral to Physical Therapy for " Evaluation & Treatment    3. Labial cyst    Labial cyst spontaneously resolved, discussed if she notices this again she can use hot compresses 3 times daily  Discussed vaginal pain with intercourse and tampon since her delivery, her pelvic exam today is normal, offered pelvic floor PT and patient would like a consult  Order placed             Total time spent today with Carina  was 30 minutes (level 4).  Greater than 50% of the time was spent coordinating care, answering her questions and counseling regarding pathophysiology of her presenting problem along with plans for any diagnositc work-up and treatment.

## 2025-08-09 DIAGNOSIS — Z30.011 ENCOUNTER FOR INITIAL PRESCRIPTION OF CONTRACEPTIVE PILLS: ICD-10-CM

## 2025-08-12 RX ORDER — NORETHINDRONE 0.35 MG/1
1 TABLET ORAL DAILY
Qty: 84 TABLET | Refills: 3 | Status: SHIPPED | OUTPATIENT
Start: 2025-08-12

## (undated) DEVICE — GLV SURG BIOGEL LTX PF 8 1/2

## (undated) DEVICE — SOL ISO/ALC 70PCT 4OZ

## (undated) DEVICE — UNDERCAST PADDING: Brand: DEROYAL

## (undated) DEVICE — BNDG ESMARK STRL 6INX12FT LF

## (undated) DEVICE — TUBING, SUCTION, 1/4" X 20', STRAIGHT: Brand: MEDLINE INDUSTRIES, INC.

## (undated) DEVICE — BLD DISSCT COOL CUT SJ CRVD 4MM 13CM

## (undated) DEVICE — SKIN PREP TRAY W/CHG: Brand: MEDLINE INDUSTRIES, INC.

## (undated) DEVICE — GLV SURG SIGNATURE ESSENTIAL PF LTX SZ8.5

## (undated) DEVICE — DISPOSABLE TOURNIQUET CUFF SINGLE BLADDER, SINGLE PORT AND QUICK CONNECT CONNECTOR: Brand: COLOR CUFF

## (undated) DEVICE — GLV SURG SIGNATURE ESSENTIAL PF LTX SZ7

## (undated) DEVICE — TUBING SET, GRAVITY, 4-SPIKE

## (undated) DEVICE — SUT ETHLN 4/0 PS2 PLSTC 1667G

## (undated) DEVICE — APPL CHLORAPREP HI/LITE 26ML ORNG

## (undated) DEVICE — GOWN,NON-REINFORCED,SIRUS,SET IN SLV,XXL: Brand: MEDLINE

## (undated) DEVICE — DRSNG WND GZ CURAD OIL EMULSION 3X3IN STRL

## (undated) DEVICE — BLD SHVE RESEC COOLCT SABRE CRV 4MM 13CM

## (undated) DEVICE — ABL ASP APOLLO RF XL 90D

## (undated) DEVICE — PK ARTHSCP 40

## (undated) DEVICE — DRP SURG U/DRP INVISISHIELD PA 48X52IN

## (undated) DEVICE — PREP SOL POVIDONE/IODINE BT 4OZ

## (undated) DEVICE — GLV SURG BIOGEL LTX PF 6 1/2

## (undated) DEVICE — GLV SURG BIOGEL LTX PF 7